# Patient Record
Sex: MALE | Race: WHITE | NOT HISPANIC OR LATINO | Employment: OTHER | URBAN - METROPOLITAN AREA
[De-identification: names, ages, dates, MRNs, and addresses within clinical notes are randomized per-mention and may not be internally consistent; named-entity substitution may affect disease eponyms.]

---

## 2017-01-16 ENCOUNTER — OFFICE VISIT (OUTPATIENT)
Dept: NEUROSURGERY | Facility: CLINIC | Age: 52
End: 2017-01-16

## 2017-01-16 VITALS
HEART RATE: 64 BPM | DIASTOLIC BLOOD PRESSURE: 80 MMHG | WEIGHT: 280 LBS | BODY MASS INDEX: 37.93 KG/M2 | HEIGHT: 72 IN | SYSTOLIC BLOOD PRESSURE: 122 MMHG

## 2017-01-16 DIAGNOSIS — G60.9 PERIPHERAL NEUROPATHY, IDIOPATHIC: Primary | ICD-10-CM

## 2017-01-16 DIAGNOSIS — M54.10 RADICULOPATHY OF ARM: ICD-10-CM

## 2017-01-16 PROCEDURE — 99213 OFFICE O/P EST LOW 20 MIN: CPT | Performed by: NEUROLOGICAL SURGERY

## 2017-01-16 NOTE — PROGRESS NOTES
Subjective   Patient ID: Malik Abbasi is a 51 y.o. male who is here today for follow-up numbness of the hands. He had an EMG/NCV on 12/30/16. He is unaccompanied for this visit today.     History of Present Illness Patient is chronically AC for cardiac issues (Atrial fibrillation/CHF?).  He reports he has had hand symtpoms for  some time.  Arm pain is positional.  Numbness is problematic as well.  He is worried about his wife and she will soon be my patient.      The following portions of the patient's history were reviewed and updated as appropriate: allergies, current medications, past family history, past medical history, past social history, past surgical history and problem list.    Review of Systems   Musculoskeletal: Negative for neck pain.   Neurological: Positive for numbness (B) hands & feet.   Psychiatric/Behavioral: Negative for sleep disturbance.       Objective   Physical Exam  Neurologic Exam     Physical Exam  Neurologic Exam   Right deltoid: 5/5  Left deltoid: 5/5  Right biceps: 5/5  Left biceps: 5/5  Right triceps: 5/5  Left triceps: 5/5  Right wrist flexion: 5/5  Left wrist flexion: 5/5  Right wrist extension: 5/5  Left wrist extension: 5/5  Right interossei: 5/5  Left interossei: 5/5      Sensory Exam   Right arm light touch: normal  Left arm light touch: sensitive in left deltoid.  Right leg light touch: normal  Left leg light touch: normal  Right arm pinprick: normal  Left arm pinprick: sensitive around left deltoid.      Gait, Coordination, and Reflexes       Gait  Gait: normal      Coordination   Romberg: negative      Reflexes   Right brachioradialis: 1+  Left brachioradialis: 1+  Right biceps: 1+  Left biceps: 1+  Right triceps: 1+  Left triceps: 1+  Right patellar: 1+  Left patellar: 1+  Right achilles: 1+  Left achilles: 1+  Right Corea: absent  Left Corea: absent  Right ankle clonus: absent  Left ankle clonus: absent      No phalen's  Minimal tinels at right wrist and left elbow  bilaterally    Assessment/Plan   Independent Review of Radiographic Studies:    I reviewed his MRI and emg/ncv.  He has moderate to severe ddd c56/c67.  Medical Decision Making:    He has nominal pain which is positional.  He is fully AC which complicates his situation.  He does have emg/ncv proven bilateral Carpal tunnel syndrome.  He is not ready to consider surgery currently.  I will rx. Cock up splints.   Malik was seen today for numbness.    Diagnoses and all orders for this visit:    Peripheral neuropathy, idiopathic    Radiculopathy of arm    No Follow-up on file.

## 2017-01-16 NOTE — MR AVS SNAPSHOT
Malik Elroy   1/16/2017 3:45 PM   Office Visit    Dept Phone:  [phone]   Encounter #:  [id number]    Provider:  Cornelio Russo IV, MD   Department:  On license of [facility] ADV NEUROSURGERY                Your Full Care Plan              Today's Medication Changes          These changes are accurate as of: 1/16/17  4:14 PM.  If you have any questions, ask your nurse or doctor.               Medication(s)that have changed:     aspirin 81 MG EC tablet   Take 81 mg by mouth Daily.   What changed:  Another medication with the same name was removed. Continue taking this medication, and follow the directions you see here.   Changed by:  Cornelio Russo IV, MD                  Your Updated Medication List          This list is accurate as of: 1/16/17  4:14 PM.  Always use your most recent med list.                aspirin 81 MG EC tablet       BENICAR HCT 40-25 MG per tablet   Generic drug:  olmesartan-hydrochlorothiazide       digoxin 125 MCG tablet   Commonly known as:  LANOXIN       fenofibric acid 135 MG capsule delayed-release delayed release capsule   Commonly known as:  TRILIPIX       furosemide 20 MG tablet   Commonly known as:  LASIX       gabapentin 300 MG capsule   Commonly known as:  NEURONTIN   Take 1 qhs for 3-5 days, then bid for 3-5 days, then tid and stay on this dose       metoprolol succinate XL 50 MG 24 hr tablet   Commonly known as:  TOPROL-XL       MULTAQ 400 MG tablet   Generic drug:  dronedarone       NEXIUM 40 MG capsule   Generic drug:  esomeprazole       XARELTO 20 MG tablet   Generic drug:  rivaroxaban               You Were Diagnosed With        Codes Comments    Peripheral neuropathy, idiopathic    -  Primary ICD-10-CM: G60.9  ICD-9-CM: 356.9     Radiculopathy of arm     ICD-10-CM: M54.10  ICD-9-CM: 723.4       Instructions     None    Patient Instructions History      Upcoming Appointments     Visit Type Date Time Department    OFFICE VISIT 1/16/2017  3:45 PM [facility]  "ADV NEURO [facility]      Screach[employer] Signup     [facility] [employer] allows you to send messages to your doctor, view your test results, renew your prescriptions, schedule appointments, and more. To sign up, go to [email or web address] and click on the Sign Up Now link in the New User? box. Enter your [employer] Activation Code exactly as it appears below along with the last four digits of your Social Security Number and your Date of Birth () to complete the sign-up process. If you do not sign up before the expiration date, you must request a new code.    2NDNATURE Activation Code: [email or web address]  Expires: 2017  5:35 AM    If you have questions, you can email [email or web address] or call [phone] to talk to our 2ND[employer] staff. Remember, [employer]URE is NOT to be used for urgent needs. For medical emergencies, dial 911.               Other Info from Your Visit           Other Notes About Your Plan     FT Aneudy PCP [facility]        Allergies     Codeine Intolerance Other (See Comments)    Syncope      Reason for Visit     Numbness Hands       Vital Signs     Blood Pressure Pulse Height Weight Body Mass Index Smoking Status    122/80 (BP Location: Left arm, Patient Position: Sitting, Cuff Size: Adult) 64 72\" (182.9 cm) 280 lb (127 kg) 37.97 kg/m2 Former Smoker      Problems and Diagnoses Noted     Peripheral neuropathy, idiopathic    Radiculopathy of arm        "

## 2019-05-24 ENCOUNTER — HOSPITAL ENCOUNTER (OUTPATIENT)
Dept: MRI IMAGING | Facility: HOSPITAL | Age: 54
Discharge: HOME | End: 2019-05-24
Attending: PHYSICIAN ASSISTANT

## 2019-06-19 ENCOUNTER — CONVERSION ENCOUNTER (OUTPATIENT)
Dept: ORTHOPEDIC SURGERY | Facility: CLINIC | Age: 54
End: 2019-06-19

## 2019-06-19 ENCOUNTER — OFFICE VISIT CONVERTED (OUTPATIENT)
Dept: ORTHOPEDIC SURGERY | Facility: CLINIC | Age: 54
End: 2019-06-19
Attending: ORTHOPAEDIC SURGERY

## 2019-09-04 ENCOUNTER — OFFICE VISIT CONVERTED (OUTPATIENT)
Dept: ORTHOPEDIC SURGERY | Facility: CLINIC | Age: 54
End: 2019-09-04
Attending: ORTHOPAEDIC SURGERY

## 2019-11-15 LAB
ANION GAP SERPL CALC-SCNC: 17 MMOL/L (ref 8–19)
BUN SERPL-MCNC: 18 MG/DL (ref 5–25)
BUN/CREAT SERPL: 17 {RATIO} (ref 6–20)
CALCIUM SERPL-MCNC: 9 MG/DL (ref 8.7–10.4)
CHLORIDE SERPL-SCNC: 105 MMOL/L (ref 99–111)
CONV CO2: 24 MMOL/L (ref 22–32)
CREAT UR-MCNC: 1.03 MG/DL (ref 0.7–1.2)
GFR SERPLBLD BASED ON 1.73 SQ M-ARVRAT: >60 ML/MIN/{1.73_M2}
GLUCOSE SERPL-MCNC: 116 MG/DL (ref 70–99)
OSMOLALITY SERPL CALC.SUM OF ELEC: 297 MOSM/KG (ref 273–304)
POTASSIUM SERPL-SCNC: 3.5 MMOL/L (ref 3.5–5.3)
SODIUM SERPL-SCNC: 142 MMOL/L (ref 135–147)

## 2019-11-21 ENCOUNTER — HOSPITAL ENCOUNTER (OUTPATIENT)
Dept: PERIOP | Facility: HOSPITAL | Age: 54
Setting detail: HOSPITAL OUTPATIENT SURGERY
Discharge: HOME | End: 2019-11-21
Attending: ORTHOPAEDIC SURGERY

## 2019-12-04 ENCOUNTER — OFFICE VISIT CONVERTED (OUTPATIENT)
Dept: ORTHOPEDIC SURGERY | Facility: CLINIC | Age: 54
End: 2019-12-04
Attending: PHYSICIAN ASSISTANT

## 2019-12-18 ENCOUNTER — HOSPITAL ENCOUNTER (OUTPATIENT)
Dept: OTHER | Facility: HOSPITAL | Age: 54
Setting detail: RECURRING SERIES
Discharge: HOME | End: 2020-03-12
Attending: ORTHOPAEDIC SURGERY

## 2019-12-30 ENCOUNTER — OFFICE VISIT CONVERTED (OUTPATIENT)
Dept: ORTHOPEDIC SURGERY | Facility: CLINIC | Age: 54
End: 2019-12-30
Attending: PHYSICIAN ASSISTANT

## 2020-01-27 ENCOUNTER — CONVERSION ENCOUNTER (OUTPATIENT)
Dept: ORTHOPEDIC SURGERY | Facility: CLINIC | Age: 55
End: 2020-01-27

## 2020-01-27 ENCOUNTER — OFFICE VISIT CONVERTED (OUTPATIENT)
Dept: ORTHOPEDIC SURGERY | Facility: CLINIC | Age: 55
End: 2020-01-27
Attending: PHYSICIAN ASSISTANT

## 2020-04-29 ENCOUNTER — TELEMEDICINE CONVERTED (OUTPATIENT)
Dept: ORTHOPEDIC SURGERY | Facility: CLINIC | Age: 55
End: 2020-04-29
Attending: PHYSICIAN ASSISTANT

## 2020-09-28 ENCOUNTER — OFFICE VISIT CONVERTED (OUTPATIENT)
Dept: SURGERY | Facility: CLINIC | Age: 55
End: 2020-09-28
Attending: NURSE PRACTITIONER

## 2021-01-11 ENCOUNTER — HOSPITAL ENCOUNTER (OUTPATIENT)
Dept: GASTROENTEROLOGY | Facility: HOSPITAL | Age: 56
Setting detail: HOSPITAL OUTPATIENT SURGERY
Discharge: HOME | End: 2021-01-11
Attending: SURGERY

## 2021-03-22 ENCOUNTER — HOSPITAL ENCOUNTER (OUTPATIENT)
Dept: MRI IMAGING | Facility: HOSPITAL | Age: 56
Discharge: HOME | End: 2021-03-22
Attending: PHYSICIAN ASSISTANT

## 2021-03-24 ENCOUNTER — HOSPITAL ENCOUNTER (OUTPATIENT)
Dept: VACCINE CLINIC | Facility: HOSPITAL | Age: 56
Discharge: HOME | End: 2021-03-24
Attending: INTERNAL MEDICINE

## 2021-04-14 ENCOUNTER — HOSPITAL ENCOUNTER (OUTPATIENT)
Dept: VACCINE CLINIC | Facility: HOSPITAL | Age: 56
Discharge: HOME | End: 2021-04-14
Attending: INTERNAL MEDICINE

## 2021-05-10 NOTE — H&P
History and Physical      Patient Name: Malik Abbasi   Patient ID: [id number]   Sex: Male   YOB: 1965    Primary Care Provider: Valeriano JACQUES   Referring Provider: Valeriano JACQUES    Visit Date: September 28, 2020    Provider: RAFIQ Stanford   Location: [facility]   Location Address: [address] [place] [address]  [id number]   Location Phone: [phone]          Chief Complaint  · Requesting colonoscopy  · Age 50 or over  · Diarrhea      History Of Present Illness  The patient is a 55 year old /White male presenting to the Surgical Specialist office on a referral from Valeriano JACQUES.   Malik Abbasi needs to have a diagnostic colonoscopy.   Patient states that they have had a colonoscopy. 10 years ago   Patient currently complains of: diarrhea   Patient Does not have family history of colon cancer.      Patient presents today on referral from Valeriano Rivero for diarrhea.  Patient's had a cholecystectomy several years ago.  But reports since having his gallbladder out he has to instantly have a bowel movement.  Patient denies any abdominal pain, or rectal bleeding.  Denies any family history of colorectal cancer.    I have discussed with the patient that sometimes after having a cholecystectomy diarrhea can be a side effect to having the gallbladder removed patient reports that this is a new symptom for him.    Reports that he takes Xarelto daily for A. fib and is under the care of Dr. Perez Garces.  We will get cardiac clearance prior to procedure.       Past Medical History  Disease Name Date Onset Notes   Arthritis --  --    Congestive heart failure --  --    GERD --  --    High blood pressure --  --    High cholesterol --  --    Hyperlipemia --  --    Hypertension --  --    Primary osteoarthritis of left knee 08/31/2017 --    Reflux --  --          Past Surgical History  Procedure Name Date Notes   Cardiac --  --    Colonoscopy --  --     Gallbladder --  --    Left knee arthroscopy with debridement --  --    Vasectomy --  --          Medication List  Name Date Started Instructions   Aspir-81 81 mg oral tablet,delayed release (DR/EC)  take 1 tablet (81 mg) by oral route once daily   Benicar HCT 40-25 mg oral tablet  take 1 tablet by oral route once daily   Dulcolax (bisacodyl) 5 mg oral tablet,delayed release (DR/EC) 09/28/2020 take as directed. Directions given in office   fenofibric acid 35 mg oral tablet  take 1 tablet (35 mg) by oral route once daily   furosemide 20 mg oral tablet  --    Lanoxin 125 mcg oral tablet  take 1 tablet (125 mcg) by oral route once daily   Miralax 17 gram/dose oral powder 09/28/2020 take as directed. Instructions given in office   Multaq 400 mg oral tablet  take 1 tablet (400 mg) by oral route 2 times per day with morning and evening meals   pantoprazole 40 mg oral tablet,delayed release (DR/EC)  --    Toprol XL 50 mg oral tablet extended release 24 hr  take 1 tablet (50 mg) by oral route once daily   Trilipix 135 mg oral capsule,delayed release(DR/EC)  take 1 capsule (135 mg) by oral route once daily   Xarelto 20 mg oral tablet  take 1 tablet (20 mg) by oral route once daily with the evening meal         Allergy List  Allergen Name Date Reaction Notes   Codeine Phosphate --  --  --    Codeine Sulfate --  --  --          Family Medical History  Disease Name Relative/Age Notes   Heart Disease Brother/  Father/   Father; Brother   Family history of certain chronic disabling diseases; arthritis Father/  Mother/   Mother; Father   Family history of Arthritis Mother/   Mother         Social History  Finding Status Start/Stop Quantity Notes   Alcohol Use Current some day --/-- --  rarely drinks, less than 1 drink per day, has been drinking for 31 or more years   lives with spouse --  --/-- --  --    . --  --/-- --  --    Recreational Drug Use Never --/-- --  no   Retired. --  --/-- --  --    Tobacco Former --/-- --   former smoker  former smoker quit 2005   Working --  --/-- --  --          Review of Systems  · Constitutional  o Denies  o : fever, chills  · Eyes  o Denies  o : yellowish discoloration of eyes  · HENT  o Denies  o : difficulty swallowing  · Cardiovascular  o Denies  o : chest pain, chest pain on exertion  · Respiratory  o Denies  o : shortness of breath  · Gastrointestinal  o Admits  o : diarrhea  o Denies  o : nausea, vomiting, constipation  · Genitourinary  o Denies  o : abnormal color of urine  · Integument  o Denies  o : rash  · Neurologic  o Denies  o : tingling or numbness  · Musculoskeletal  o Denies  o : joint pain  · Endocrine  o Denies  o : weight gain, weight loss      Vitals  Date Time BP Position Site L\R Cuff Size HR RR TEMP (F) WT  HT  BMI kg/m2 BSA m2 O2 Sat HC       09/28/2020 01:40 PM       18  324lbs 8oz 6'   44.01 2.73           Physical Examination  · Constitutional  o Appearance  o : well developed, well-nourished, patient in no apparent distress  · Head and Face  o Head  o :   § Inspection  § : atraumatic, normocephalic  o Face  o :   § Inspection  § : no facial lesions  · Eyes  o Conjunctivae  o : conjunctivae normal  o Sclerae  o : sclerae white  · Neck  o Inspection/Palpation  o : normal appearance, no masses or tenderness, trachea midline  · Respiratory  o Respiratory Effort  o : breathing unlabored  · Skin and Subcutaneous Tissue  o General Inspection  o : no lesions present, no areas of discoloration, skin turgor normal, texture normal  · Neurologic  o Mental Status Examination  o :   § Orientation  § : grossly oriented to person, place and time  § Attention  § : attention normal, concentration abilities normal  § Fund of Knowledge  § : fund of knowledge within normal limits, patient aware of current events  o Gait and Station  o : normal gait, able to stand without difficulty  · Psychiatric  o Judgement and Insight  o : judgment and insight intact  o Mood and Affect  o : mood normal,  affect appropriate              Assessment  · Diarrhea     787.91/R19.7  · Pre-op testing     V72.84/Z01.818    Problems Reconciled  Plan  · Orders  o Consent for Colonoscopy with Possible Biopsy - Possible risks/complications, benefits, and alternatives to surgical or invasive procedure have been explained to patient and/or legal guardian. -Patient has been evaluated and can tolerate anesthesia and/or sedation. Risks, benefits, and alternatives to anesthesia and sedation have been explained to patient and/or legal guardian. (73846) - 787.91/R19.7, V72.84/Z01.818 - 01/11/2021  o [facility] Pre-Op Covid-19 Screening (72075) - 787.91/R19.7, V72.84/Z01.818 - 01/06/2021   [address] @ 8:00am  · Medications  o Medications have been Reconciled  o Transition of Care or Provider Policy  · Instructions  o Surgical Facility: [facility]  o Handouts Provided Pre-Procedure Instructions including date, time, and location of procedure.   o PLAN: Proceeed with colonoscopy. Patient understands risks/benefits and is willing to proceed.   o ***Surgical Orders***  o RISK AND BENEFITS:  o Given these options, the patient has verbally expressed an understanding of the risks of the surgery and finds these risks acceptable. Will proceed with surgery as soon as possible.  o O.R. PREP: Per protocol   o IV: Per Anesthesia  o Please sign permit for: Colonoscopy with possible biopsies by Dr. Mcintyre.  o The above History and Physical Examination has been completed within 30 days of admission.  o ***Patient Status***  o Outpatient  o Follow up in the in the office post procedure.  o Advised patient he would need COVID-19 testing prior to procedure. Encourage patient to self isolate in between testing and procedure. Patient verbalizes understanding is willing to proceed  o instructed patient to hold his Xarelto for 2 day prior to procedure or until clearance from Dr. Perez Garcse.  o Electronically Identified Patient Education  Materials Provided Electronically  · Disposition  o Call or Return if symptoms worsen or persist.            Electronically Signed by: RAFIQ Stanford -Author on September 28, 2020 02:22:58 PM

## 2021-05-12 NOTE — PROGRESS NOTES
Progress Note      Patient Name: Malik Abbasi   Patient ID: [id number]   Sex: Male   YOB: 1965    Primary Care Provider: Valeriano JACQUES   Referring Provider: Valeriano JACQUES    Visit Date: April 29, 2020    Provider: Liana Pappas PA-C   Location: [facility]   Location Address: [address] [place] [address]  [id number]   Location Phone: [phone]          History Of Present Illness  Video Conferencing Visit  Malik Abbasi is a 55 year old /White male who is presenting for evaluation via video conferencing. Verbal consent obtained before beginning visit.   The following staff were present during this visit: INPUT BOX   Malik Abbasi is a 55 year old /White male who presents today to [facility].      Patient is status post right arthroscopic SAD/DC, mini open RCR, biceps tenodesis 11/21/19 by Dr. Chang. Patient states stiffness in right shoulder. Patient completed physical therapy at [facility].               Past Medical History  Arthritis; Congestive heart failure; Hyperlipemia; Hypertension; Primary osteoarthritis of left knee; Reflux         Past Surgical History  Colonoscopy; Gallbladder; Left knee arthroscopy with debridement; Vasectomy         Medication List  Aspir-81 81 mg oral tablet,delayed release (DR/EC); Benicar HCT 40-25 mg oral tablet; furosemide 20 mg oral tablet; Lanoxin 125 mcg oral tablet; Multaq 400 mg oral tablet; Toprol XL 50 mg oral tablet extended release 24 hr; Trilipix 135 mg oral capsule,delayed release(DR/EC); Xarelto 20 mg oral tablet         Allergy List  Codeine Phosphate; Codeine Sulfate         Family Medical History  Heart Disease; Family history of certain chronic disabling diseases; arthritis; Family history of Arthritis         Social History  Alcohol Use (Current some day); lives with spouse; .; Recreational Drug Use (Never); Retired.; Tobacco (Former); Working         Review of  Systems  · Constitutional  o Denies  o : fever, chills, weight loss  · Cardiovascular  o Denies  o : chest pain, shortness of breath  · Gastrointestinal  o Denies  o : liver disease, heartburn, nausea, blood in stools  · Genitourinary  o Denies  o : painful urination, blood in urine  · Integument  o Denies  o : rash, itching  · Neurologic  o Denies  o : headache, weakness, loss of consciousness  · Musculoskeletal  o Denies  o : painful, swollen joints  · Psychiatric  o Denies  o : drug/alcohol addiction, anxiety, depression      Vitals  Date Time BP Position Site L\R Cuff Size HR RR TEMP (F) WT  HT  BMI kg/m2 BSA m2 O2 Sat HC       04/29/2020 07:35 AM         290lbs 0oz 6'   39.33 2.59           Physical Examination  · Constitutional  o Appearance  o : well developed, well-nourished, no obvious deformities present  · Head and Face  o Head  o :   § Inspection  § : normocephalic  o Face  o :   § Inspection  § : no facial lesions  · Eyes  o Conjunctivae  o : conjunctivae normal  o Sclerae  o : sclerae white  · Ears, Nose, Mouth and Throat  o Ears  o :   § External Ears  § : appearance within normal limits  § Hearing  § : intact  o Nose  o :   § External Nose  § : appearance normal  · Neck  o Inspection/Palpation  o : normal appearance  o Range of Motion  o : full range of motion  · Skin and Subcutaneous Tissue  o General Inspection  o : intact, no rashes  · Psychiatric  o General  o : Alert and oriented x3  o Judgement and Insight  o : judgment and insight intact  o Mood and Affect  o : mood normal, affect appropriate  · Right Shoulder-Street  o Inspection  o : scars well healed  o ROM  o : Full extension, full flexion, full abduction, full internal rotation, full external rotation, no winging of scapula, no scapular dyskinesis, full cervical ROM , full cross body adduction          Assessment  · Aftercare following surgery of the muskuloskeletal system     V54.81  · Right shoulder pain, unspecified  chronicity     719.41/M25.511      Plan  · Medications  o Medications have been Reconciled  o Transition of Care or Provider Policy  · Instructions  o Reviewed the patient's Past Medical, Social, and Family history as well as the ROS at today's visit, no changes.  o Call or return if worsening symptoms.  o Follow Up PRN.  o Electronically Identified Patient Education Materials Provided Electronically  · Referrals  o ID: [id number] Date: 06/19/2019 Type: Inbound  Specialty: Orthopedic Surgery            Electronically Signed by: Liana Pappas PA-C -Author on April 29, 2020 07:57:14 AM  Electronically Co-signed by: Fred Chang MD -Reviewer on April 29, 2020 09:07:04 AM

## 2021-05-14 VITALS — HEIGHT: 72 IN | RESPIRATION RATE: 18 BRPM | BODY MASS INDEX: 42.66 KG/M2 | WEIGHT: 315 LBS

## 2021-05-15 VITALS — HEIGHT: 72 IN | HEART RATE: 84 BPM | OXYGEN SATURATION: 96 % | BODY MASS INDEX: 42.16 KG/M2 | WEIGHT: 311.25 LBS

## 2021-05-15 VITALS — WEIGHT: 293 LBS | OXYGEN SATURATION: 96 % | HEART RATE: 63 BPM | HEIGHT: 72 IN | BODY MASS INDEX: 39.68 KG/M2

## 2021-05-15 VITALS — WEIGHT: 292 LBS | OXYGEN SATURATION: 97 % | HEART RATE: 67 BPM | HEIGHT: 72 IN | BODY MASS INDEX: 39.55 KG/M2

## 2021-05-15 VITALS — HEIGHT: 72 IN | OXYGEN SATURATION: 97 % | HEART RATE: 88 BPM | WEIGHT: 297 LBS | BODY MASS INDEX: 40.23 KG/M2

## 2021-05-15 VITALS — HEIGHT: 72 IN | BODY MASS INDEX: 40.23 KG/M2 | OXYGEN SATURATION: 98 % | HEART RATE: 78 BPM | WEIGHT: 297 LBS

## 2021-05-15 VITALS — HEIGHT: 72 IN | BODY MASS INDEX: 39.28 KG/M2 | WEIGHT: 290 LBS

## 2021-12-03 ENCOUNTER — TRANSCRIBE ORDERS (OUTPATIENT)
Dept: ADMINISTRATIVE | Facility: HOSPITAL | Age: 56
End: 2021-12-03

## 2021-12-03 DIAGNOSIS — I10 ESSENTIAL HYPERTENSION: ICD-10-CM

## 2021-12-03 DIAGNOSIS — R07.9 CHEST PAIN, UNSPECIFIED TYPE: Primary | ICD-10-CM

## 2021-12-20 ENCOUNTER — HOSPITAL ENCOUNTER (OUTPATIENT)
Dept: NUCLEAR MEDICINE | Facility: HOSPITAL | Age: 56
Discharge: HOME | End: 2021-12-20

## 2021-12-20 DIAGNOSIS — I10 ESSENTIAL HYPERTENSION: ICD-10-CM

## 2021-12-20 DIAGNOSIS — R07.9 CHEST PAIN, UNSPECIFIED TYPE: ICD-10-CM

## 2021-12-20 PROCEDURE — A9502 TC99M TETROFOSMIN: HCPCS | Performed by: SPECIALIST

## 2021-12-20 PROCEDURE — 78452 HT MUSCLE IMAGE SPECT MULT: CPT

## 2021-12-20 PROCEDURE — 0 TECHNETIUM TETROFOSMIN KIT: Performed by: SPECIALIST

## 2021-12-20 PROCEDURE — 25010000002 REGADENOSON 0.4 MG/5ML SOLUTION

## 2021-12-20 PROCEDURE — 93017 CV STRESS TEST TRACING ONLY: CPT

## 2021-12-20 RX ADMIN — TETROFOSMIN 1 DOSE: 1.38 INJECTION, POWDER, LYOPHILIZED, FOR SOLUTION INTRAVENOUS at 10:07

## 2021-12-20 RX ADMIN — REGADENOSON 0.4 MG: 0.08 INJECTION, SOLUTION INTRAVENOUS at 10:07

## 2021-12-20 RX ADMIN — TETROFOSMIN 1 DOSE: 1.38 INJECTION, POWDER, LYOPHILIZED, FOR SOLUTION INTRAVENOUS at 07:38

## 2021-12-21 LAB
BH CV IMMEDIATE POST TECH DATA BLOOD PRESSURE: NORMAL MMHG
BH CV IMMEDIATE POST TECH DATA HEART RATE: 92 BPM
BH CV IMMEDIATE POST TECH DATA OXYGEN SATS: 95 %
BH CV REST NUCLEAR ISOTOPE DOSE: 9 MCI
BH CV SIX MINUTE RECOVERY TECH DATA BLOOD PRESSURE: NORMAL
BH CV SIX MINUTE RECOVERY TECH DATA HEART RATE: 73 BPM
BH CV SIX MINUTE RECOVERY TECH DATA OXYGEN SATURATION: 95 %
BH CV SIX MINUTE RECOVERY TECH DATA SYMPTOMS: NORMAL
BH CV STRESS BP STAGE 1: NORMAL
BH CV STRESS COMMENTS STAGE 1: NORMAL
BH CV STRESS DOSE REGADENOSON STAGE 1: 0.4
BH CV STRESS DURATION MIN STAGE 1: 0
BH CV STRESS DURATION SEC STAGE 1: 10
BH CV STRESS HR STAGE 1: 61
BH CV STRESS NUCLEAR ISOTOPE DOSE: 35.7 MCI
BH CV STRESS O2 STAGE 1: 97
BH CV STRESS PROTOCOL 1: NORMAL
BH CV STRESS RECOVERY BP: NORMAL MMHG
BH CV STRESS RECOVERY HR: 83 BPM
BH CV STRESS RECOVERY O2: 95 %
BH CV STRESS STAGE 1: 1
BH CV THREE MINUTE POST TECH DATA BLOOD PRESSURE: NORMAL MMHG
BH CV THREE MINUTE POST TECH DATA HEART RATE: 70 BPM
BH CV THREE MINUTE POST TECH DATA OXYGEN SATURATION: 97 %
MAXIMAL PREDICTED HEART RATE: 162 BPM
PERCENT MAX PREDICTED HR: 60.49 %
STRESS BASELINE BP: NORMAL MMHG
STRESS BASELINE HR: 56 BPM
STRESS O2 SAT REST: 96 %
STRESS PERCENT HR: 71 %
STRESS POST O2 SAT PEAK: 98 %
STRESS POST PEAK BP: NORMAL MMHG
STRESS POST PEAK HR: 98 BPM
STRESS TARGET HR: 138 BPM

## 2021-12-30 ENCOUNTER — APPOINTMENT (OUTPATIENT)
Dept: NUCLEAR MEDICINE | Facility: HOSPITAL | Age: 56
End: 2021-12-30

## 2022-04-15 ENCOUNTER — TELEPHONE (OUTPATIENT)
Dept: ORTHOPEDIC SURGERY | Facility: CLINIC | Age: 57
End: 2022-04-15

## 2022-04-15 NOTE — TELEPHONE ENCOUNTER
Provider: DR NEWSOME  Caller: SOPHIA MENDENHALL  Relationship to Patient: PATIENT     Phone Number: [phone]  Reason for Call: PATIENT SAYS HE IS ESTABLISHED WITH DR NEWSOME OVER 5 YEARS AGO WITH JINNY KNEE INJECTIONS.  PATIENT WAS LAST SEEN BY DR NEWSOME IN 2019 FOR R/SHOULDER    PATIENT IS GOING ON A TRIP AND WOULD LIKE TO COME IN FOR JINNY KNEE INJECTIONS, LEAVING MAY 7 2022

## 2022-04-25 ENCOUNTER — OFFICE VISIT (OUTPATIENT)
Dept: ORTHOPEDIC SURGERY | Facility: CLINIC | Age: 57
End: 2022-04-25

## 2022-04-25 VITALS — BODY MASS INDEX: 42.66 KG/M2 | WEIGHT: 315 LBS | HEIGHT: 72 IN | OXYGEN SATURATION: 98 % | HEART RATE: 88 BPM

## 2022-04-25 DIAGNOSIS — G89.29 CHRONIC PAIN OF BOTH KNEES: Primary | ICD-10-CM

## 2022-04-25 DIAGNOSIS — M25.561 CHRONIC PAIN OF BOTH KNEES: Primary | ICD-10-CM

## 2022-04-25 DIAGNOSIS — M25.562 CHRONIC PAIN OF BOTH KNEES: Primary | ICD-10-CM

## 2022-04-25 PROCEDURE — 20610 DRAIN/INJ JOINT/BURSA W/O US: CPT | Performed by: PHYSICIAN ASSISTANT

## 2022-04-25 RX ORDER — PANTOPRAZOLE SODIUM 40 MG/1
TABLET, DELAYED RELEASE ORAL
COMMUNITY
Start: 2022-03-10 | End: 2022-11-18

## 2022-04-25 RX ORDER — TRIAMCINOLONE ACETONIDE 40 MG/ML
40 INJECTION, SUSPENSION INTRA-ARTICULAR; INTRAMUSCULAR
Status: COMPLETED | OUTPATIENT
Start: 2022-04-25 | End: 2022-04-25

## 2022-04-25 RX ORDER — ASPIRIN 81 MG/1
TABLET ORAL
COMMUNITY

## 2022-04-25 RX ORDER — FENOFIBRIC ACID 35 MG/1
TABLET ORAL
COMMUNITY
End: 2022-11-18

## 2022-04-25 RX ORDER — LIDOCAINE HYDROCHLORIDE 10 MG/ML
5 INJECTION, SOLUTION INFILTRATION; PERINEURAL
Status: COMPLETED | OUTPATIENT
Start: 2022-04-25 | End: 2022-04-25

## 2022-04-25 RX ORDER — ERGOCALCIFEROL 1.25 MG/1
50000 CAPSULE ORAL WEEKLY
COMMUNITY
Start: 2022-03-10

## 2022-04-25 RX ORDER — DIGOXIN 125 MCG
TABLET ORAL
COMMUNITY
End: 2022-11-18

## 2022-04-25 RX ORDER — ALLOPURINOL 300 MG/1
300 TABLET ORAL DAILY
COMMUNITY
Start: 2022-04-01 | End: 2023-04-01

## 2022-04-25 RX ORDER — INDOMETHACIN 50 MG/1
50 CAPSULE ORAL AS NEEDED
COMMUNITY
Start: 2022-03-08

## 2022-04-25 RX ADMIN — LIDOCAINE HYDROCHLORIDE 5 ML: 10 INJECTION, SOLUTION INFILTRATION; PERINEURAL at 08:18

## 2022-04-25 RX ADMIN — TRIAMCINOLONE ACETONIDE 40 MG: 40 INJECTION, SUSPENSION INTRA-ARTICULAR; INTRAMUSCULAR at 08:18

## 2022-04-25 NOTE — PROGRESS NOTES
"Chief Complaint  Bilateral knee pain     Subjective          Malik Abbasi presents to [facility] for follow-up on bilateral knee pain.  Patient last seen in 2017.  He has a history of left knee scope.  States both knees have been hurting worse recently and is [place] and wants to be able to walk around without as much pain.  Left knee feels stiff, right knee is more painful.  He notices decreased flexion on the left knee.  Not currently taking anything for pain relief.  He is requesting bilateral knee steroid injections today.  Patient is not diabetic.    Objective   Allergies   Allergen Reactions   • Codeine Other (See Comments)     Syncope       Vital Signs:   Pulse 88   Ht 182.9 cm (72\")   Wt (!) 148 kg (325 lb 9.6 oz)   SpO2 98%   BMI 44.16 kg/m²       Physical Exam  Constitutional:       Appearance: Normal appearance. Patient is well-developed and normal weight.   HENT:      Head: Normocephalic.      Right Ear: Hearing and external ear normal.      Left Ear: Hearing and external ear normal.      Nose: Nose normal.   Eyes:      Conjunctiva/sclera: Conjunctivae normal.   Cardiovascular:      Rate and Rhythm: Normal rate.   Pulmonary:      Effort: Pulmonary effort is normal.      Breath sounds: No wheezing or rales.   Abdominal:      Palpations: Abdomen is soft.      Tenderness: There is no abdominal tenderness.   Musculoskeletal:      Cervical back: Normal range of motion.   Skin:     Findings: No rash.   Neurological:      Mental Status: Patient is alert and oriented to person, place, and time.   Psychiatric:         Mood and Affect: Mood and affect normal.         Judgment: Judgment normal.     Ortho Exam  Bilateral knees: Skin intact, moderate swelling, tenderness on the joint line, crepitus with range of motion, left knee range of motion 0-115 right knee 0-120.  Gait nonantalgic.  Knees are stable on exam.  Sensation and pulses " intact distally, good range of motion ankle and digits.  Result Review :            Imaging Results (Most Recent)     Procedure Component Value Units Date/Time    XR Knee 3 View Bilateral [[id number]] Resulted: 04/25/22 1006     Updated: 04/25/22 1007    Narrative:      X-Ray Report:  Study: X-rays ordered, taken in the office, and reviewed today  Site: Bilateral knee xray  Indication: Pain  View: AP, Lateral and Sunrise view(s)  Findings: Evidence for moderate osteoarthritic changes most pronounced in   the medial joint space with narrowing, patellofemoral arthritis, no acute   osseous abnormalities or malalignment  Prior studies available for comparison: no            Left knee : L knee  Date/Time: 4/25/2022 8:18 AM  Consent given by: patient  Site marked: site marked  Timeout: Immediately prior to procedure a time out was called to verify the correct patient, procedure, equipment, support staff and site/side marked as required   Supporting Documentation  Indications: pain   Procedure Details  Location: knee - L knee  Needle gauge: 21G.  Medications administered: 5 mL lidocaine 1 %; 40 mg triamcinolone acetonide 40 MG/ML  Patient tolerance: patient tolerated the procedure well with no immediate complications    Right knee : R knee  Date/Time: 4/25/2022 8:18 AM  Consent given by: patient  Site marked: site marked  Timeout: Immediately prior to procedure a time out was called to verify the correct patient, procedure, equipment, support staff and site/side marked as required   Supporting Documentation  Indications: pain   Procedure Details  Location: knee - R knee  Needle gauge: 21G.  Medications administered: 5 mL lidocaine 1 %; 40 mg triamcinolone acetonide 40 MG/ML  Patient tolerance: patient tolerated the procedure well with no immediate complications            Assessment and Plan    Problem List Items Addressed This Visit        Musculoskeletal and Injuries    Chronic pain of both knees - Primary    Current  Assessment & Plan     X-rays taken and reviewed.  Patient requested bilateral knee steroid injections, risks and benefits discussed and he tolerated this well.  Plan to follow-up as needed moving forward for new or worsening symptoms.           Relevant Orders    XR Knee 3 View Bilateral (Completed)          Follow Up   Return if symptoms worsen or fail to improve.  Patient Instructions   X-rays taken and reviewed.  Patient requested bilateral knee steroid injections, risks and benefits discussed and he tolerated this well.  Plan to follow-up as needed moving forward for new or worsening symptoms.    Patient was given instructions and counseling regarding his condition or for health maintenance advice. Please see specific information pulled into the AVS if appropriate.

## 2022-04-25 NOTE — PATIENT INSTRUCTIONS
X-rays taken and reviewed.  Patient requested bilateral knee steroid injections, risks and benefits discussed and he tolerated this well.  Plan to follow-up as needed moving forward for new or worsening symptoms.

## 2022-09-16 ENCOUNTER — OFFICE VISIT (OUTPATIENT)
Dept: SURGERY | Facility: CLINIC | Age: 57
End: 2022-09-16

## 2022-09-16 ENCOUNTER — PREP FOR SURGERY (OUTPATIENT)
Dept: OTHER | Facility: HOSPITAL | Age: 57
End: 2022-09-16

## 2022-09-16 VITALS — RESPIRATION RATE: 20 BRPM | WEIGHT: 315 LBS | BODY MASS INDEX: 42.66 KG/M2 | HEIGHT: 72 IN

## 2022-09-16 DIAGNOSIS — K21.9 GASTROESOPHAGEAL REFLUX DISEASE WITHOUT ESOPHAGITIS: Primary | ICD-10-CM

## 2022-09-16 PROCEDURE — 99213 OFFICE O/P EST LOW 20 MIN: CPT | Performed by: SURGERY

## 2022-09-16 RX ORDER — PANTOPRAZOLE SODIUM 40 MG/1
40 TABLET, DELAYED RELEASE ORAL DAILY
COMMUNITY
Start: 2022-09-06

## 2022-09-16 NOTE — PROGRESS NOTES
Inpatient History and Physical Surgical Orders    Preadmission Location:   Preadmission Time:  Facility:  Surgery Date:  Surgery Time:  Preadmission Test date:     Chief Complaint  Outpatient History and Physical / Surgical Orders    Primary Care Provider: Valeriano Abdalla PA    Referring Provider: Valeriano Abdalla,*    Subjective      Patient Name: Malik Abbasi : 1965    HPI  The patient is a 57-year-old gentleman that we have taken care of in the past.  He is having more trouble with his reflux.  He takes daily Protonix 40 mg.  He does not feel like his symptom control is as good currently.    Past History:  Medical History: has a past medical history of Arthritis, GERD (gastroesophageal reflux disease), Hyperlipidemia, Hypertension, and Irregular heartbeat.   Surgical History: has a past surgical history that includes Cholecystectomy () and Cardioversion ().   Family History: family history includes Heart disease in his maternal grandfather.   Social History: reports that he quit smoking about 15 years ago. His smoking use included cigarettes. He smoked 1.50 packs per day. He has never used smokeless tobacco. He reports current alcohol use of about 6.0 standard drinks of alcohol per week. He reports that he does not use drugs.  Allergies: Codeine       Current Outpatient Medications:   •  allopurinol (ZYLOPRIM) 300 MG tablet, Take 300 mg by mouth Daily., Disp: , Rfl:   •  aspirin 81 MG EC tablet, Take 81 mg by mouth Daily., Disp: , Rfl:   •  aspirin 81 MG EC tablet, aspirin 81 mg tablet,delayed release  Take 1 tablet every day by oral route., Disp: , Rfl:   •  BENICAR HCT 40-25 MG per tablet, 1 tablet Daily., Disp: , Rfl:   •  digoxin (LANOXIN) 125 MCG tablet, Take 125 mcg by mouth every day., Disp: , Rfl:   •  digoxin (LANOXIN) 125 MCG tablet, Lanoxin 125 mcg oral tablet take 1 tablet (125 mcg) by oral route once daily   Active, Disp: , Rfl:   •  ergocalciferol  "(ERGOCALCIFEROL) 1.25 MG (22577 UT) capsule, ergocalciferol (vitamin D2) 1,250 mcg (50,000 unit) capsule, Disp: , Rfl:   •  fenofibric acid (TRILIPIX) 135 MG capsule delayed-release delayed release capsule, 135 mg Daily., Disp: , Rfl:   •  Fenofibric Acid 35 MG tablet, fenofibric acid 35 mg oral tablet take 1 tablet (35 mg) by oral route once daily   Active, Disp: , Rfl:   •  furosemide (LASIX) 20 MG tablet, 20 mg Daily., Disp: , Rfl:   •  gabapentin (NEURONTIN) 300 MG capsule, Take 1 qhs for 3-5 days, then bid for 3-5 days, then tid and stay on this dose (Patient taking differently: 2 (Two) Times a Day. Take 1 qhs for 3-5 days, then bid for 3-5 days, then tid and stay on this dose), Disp: 120 capsule, Rfl: 3  •  indomethacin (INDOCIN) 50 MG capsule, Take 50 mg by mouth 3 (Three) Times a Day., Disp: , Rfl:   •  metoprolol succinate XL (TOPROL-XL) 50 MG 24 hr tablet, 50 mg., Disp: , Rfl:   •  MULTAQ 400 MG tablet, 400 mg 2 (Two) Times a Day With Meals., Disp: , Rfl:   •  NEXIUM 40 MG capsule, Every Morning Before Breakfast., Disp: , Rfl:   •  pantoprazole (PROTONIX) 40 MG EC tablet, pantoprazole 40 mg tablet,delayed release, Disp: , Rfl:   •  pantoprazole (PROTONIX) 40 MG EC tablet, Take 40 mg by mouth Daily., Disp: , Rfl:   •  XARELTO 20 MG tablet, 20 mg Daily With Dinner., Disp: , Rfl:        Objective   Vital Signs:   Resp 20   Ht 182.9 cm (72\")   Wt (!) 144 kg (318 lb)   BMI 43.13 kg/m²       Physical Exam  Vitals and nursing note reviewed.   Constitutional:       Appearance: Normal appearance. The patient is well-developed.   Cardiovascular:      Rate and Rhythm: Normal rate and regular rhythm.   Pulmonary:      Effort: Pulmonary effort is normal.      Breath sounds: Normal air entry.   Abdominal:      General: Bowel sounds are normal.      Palpations: Abdomen is soft.      Skin:     General: Skin is warm and dry.   Neurological:      Mental Status: The patient is alert and oriented to person, place, and " time.      Motor: Motor function is intact.   Psychiatric:         Mood and Affect: Mood normal.       Result Review :               Assessment and Plan   Diagnoses and all orders for this visit:    1. Gastroesophageal reflux disease without esophagitis (Primary)    We will schedule him for an EGD.  I have described the procedure to him as well as the risk and benefits and he is agreeable to proceeding.    I  Reese Mcintyre MD  09/16/2022

## 2022-11-21 ENCOUNTER — HOSPITAL ENCOUNTER (OUTPATIENT)
Facility: HOSPITAL | Age: 57
Setting detail: HOSPITAL OUTPATIENT SURGERY
Discharge: HOME | End: 2022-11-21
Attending: SURGERY | Admitting: SURGERY

## 2022-11-21 ENCOUNTER — ANESTHESIA EVENT (OUTPATIENT)
Dept: GASTROENTEROLOGY | Facility: HOSPITAL | Age: 57
End: 2022-11-21

## 2022-11-21 ENCOUNTER — ANESTHESIA (OUTPATIENT)
Dept: GASTROENTEROLOGY | Facility: HOSPITAL | Age: 57
End: 2022-11-21

## 2022-11-21 VITALS
BODY MASS INDEX: 42.66 KG/M2 | OXYGEN SATURATION: 97 % | TEMPERATURE: 97.5 F | DIASTOLIC BLOOD PRESSURE: 91 MMHG | HEART RATE: 52 BPM | RESPIRATION RATE: 20 BRPM | HEIGHT: 72 IN | WEIGHT: 315 LBS | SYSTOLIC BLOOD PRESSURE: 128 MMHG

## 2022-11-21 DIAGNOSIS — K21.9 GASTROESOPHAGEAL REFLUX DISEASE WITHOUT ESOPHAGITIS: ICD-10-CM

## 2022-11-21 PROCEDURE — 25010000002 PROPOFOL 10 MG/ML EMULSION

## 2022-11-21 PROCEDURE — 88305 TISSUE EXAM BY PATHOLOGIST: CPT | Performed by: SURGERY

## 2022-11-21 PROCEDURE — 25010000002 ATROPINE PER 0.01 MG

## 2022-11-21 RX ORDER — DEXMEDETOMIDINE HYDROCHLORIDE 100 UG/ML
INJECTION, SOLUTION INTRAVENOUS AS NEEDED
Status: DISCONTINUED | OUTPATIENT
Start: 2022-11-21 | End: 2022-11-21 | Stop reason: SURG

## 2022-11-21 RX ORDER — ATROPINE SULFATE 0.4 MG/ML
AMPUL (ML) INJECTION AS NEEDED
Status: DISCONTINUED | OUTPATIENT
Start: 2022-11-21 | End: 2022-11-21 | Stop reason: SURG

## 2022-11-21 RX ORDER — PROPOFOL 10 MG/ML
VIAL (ML) INTRAVENOUS AS NEEDED
Status: DISCONTINUED | OUTPATIENT
Start: 2022-11-21 | End: 2022-11-21 | Stop reason: SURG

## 2022-11-21 RX ORDER — LIDOCAINE HYDROCHLORIDE 20 MG/ML
INJECTION, SOLUTION EPIDURAL; INFILTRATION; INTRACAUDAL; PERINEURAL AS NEEDED
Status: DISCONTINUED | OUTPATIENT
Start: 2022-11-21 | End: 2022-11-21 | Stop reason: SURG

## 2022-11-21 RX ORDER — SODIUM CHLORIDE, SODIUM LACTATE, POTASSIUM CHLORIDE, CALCIUM CHLORIDE 600; 310; 30; 20 MG/100ML; MG/100ML; MG/100ML; MG/100ML
30 INJECTION, SOLUTION INTRAVENOUS CONTINUOUS
Status: DISCONTINUED | OUTPATIENT
Start: 2022-11-21 | End: 2022-11-21 | Stop reason: HOSPADM

## 2022-11-21 RX ADMIN — SODIUM CHLORIDE, POTASSIUM CHLORIDE, SODIUM LACTATE AND CALCIUM CHLORIDE: 600; 310; 30; 20 INJECTION, SOLUTION INTRAVENOUS at 12:18

## 2022-11-21 RX ADMIN — DEXMEDETOMIDINE HYDROCHLORIDE 10 MCG: 100 INJECTION, SOLUTION, CONCENTRATE INTRAVENOUS at 12:20

## 2022-11-21 RX ADMIN — PROPOFOL 200 MG: 10 INJECTION, EMULSION INTRAVENOUS at 12:20

## 2022-11-21 RX ADMIN — ATROPINE SULFATE 0.4 MG: 0.4 INJECTION, SOLUTION INTRAMUSCULAR; INTRAVENOUS; SUBCUTANEOUS at 12:22

## 2022-11-21 RX ADMIN — PROPOFOL 175 MCG/KG/MIN: 10 INJECTION, EMULSION INTRAVENOUS at 12:20

## 2022-11-21 RX ADMIN — LIDOCAINE HYDROCHLORIDE 50 MG: 20 INJECTION, SOLUTION EPIDURAL; INFILTRATION; INTRACAUDAL; PERINEURAL at 12:20

## 2022-11-21 NOTE — ANESTHESIA POSTPROCEDURE EVALUATION
Patient: Malik Abbasi    Procedure Summary     Date: 11/21/22 Room / Location: [facility] ENDOSCOPY 3 / [facility] ENDOSCOPY    Anesthesia Start: 1218 Anesthesia Stop: 1232    Procedure: ESOPHAGOGASTRODUODENOSCOPY WITH BIOPSIES Diagnosis:       Gastroesophageal reflux disease without esophagitis      (Gastroesophageal reflux disease without esophagitis [K21.9])    Surgeons: Reese Mcintyre MD Provider: Darci Chen MD    Anesthesia Type: general ASA Status: 3          Anesthesia Type: general    Vitals  Vitals Value Taken Time   /91 11/21/22 1247   Temp 36.4 °C (97.5 °F) 11/21/22 1245   Pulse 58 11/21/22 1247   Resp 20 11/21/22 1245   SpO2 91 % 11/21/22 1247   Vitals shown include unvalidated device data.        Post Anesthesia Care and Evaluation    Patient location during evaluation: bedside  Patient participation: complete - patient participated  Level of consciousness: awake and alert  Pain management: adequate    Airway patency: patent  Anesthetic complications: No anesthetic complications  PONV Status: none  Cardiovascular status: acceptable  Respiratory status: acceptable  Hydration status: acceptable    Comments: An Anesthesiologist personally participated in the most demanding procedures (including induction and emergence if applicable) in the anesthesia plan, monitored the course of anesthesia administration at frequent intervals and remained physically present and available for immediate diagnosis and treatment of emergencies.

## 2022-11-21 NOTE — H&P
[facility]   HISTORY AND PHYSICAL    Patient Name: Malik Abbasi  : 1965  MRN: [id number]  Primary Care Physician:  Valeriano Abdalla PA  Date of admission: 2022    Subjective   Subjective     Chief Complaint: GERD    HPI:    Malik Abbasi is a 57 y.o. male who presents with worsening GERD symptoms.  He is on a proton pump inhibitor but is having breakthrough heartburn symptoms.    Review of Systems   Respiratory: Negative for shortness of breath.    Cardiovascular: Negative for chest pain.       Personal History     Past Medical History:   Diagnosis Date   • A-fib (HCC)    • Arthritis    • CHF (congestive heart failure) (HCC)    • GERD (gastroesophageal reflux disease)    • Hyperlipidemia    • Hypertension    • Irregular heartbeat    • Sleep apnea     cpap       Past Surgical History:   Procedure Laterality Date   • CARDIOVERSION     • CHOLECYSTECTOMY     • KNEE ARTHROSCOPY Left    • ROTATOR CUFF REPAIR Right 2019   • TRICEP TENDON REPAIR Right 2019       Family History: family history includes Heart disease in his maternal grandfather. Otherwise pertinent FHx was reviewed and not pertinent to current issue.    Social History:  reports that he quit smoking about 16 years ago. His smoking use included cigarettes. He smoked an average of 1.5 packs per day. He has never used smokeless tobacco. He reports that he does not currently use alcohol. He reports that he does not use drugs.    Home Medications:  allopurinol, aspirin, dronedarone, ergocalciferol, fenofibric acid, indomethacin, metoprolol succinate XL, olmesartan-hydrochlorothiazide, pantoprazole, and rivaroxaban    Allergies:  Allergies   Allergen Reactions   • Codeine Other (See Comments)     Syncope       Objective    Objective     Vitals:   Temp:  [98.5 °F (36.9 °C)] 98.5 °F (36.9 °C)  Heart Rate:  [52] 52  Resp:  [20] 20  BP: (155)/(105) 155/105    Physical Exam  Constitutional:       Appearance: Normal  appearance.   HENT:      Head: Normocephalic.   Cardiovascular:      Rate and Rhythm: Normal rate.   Pulmonary:      Effort: Pulmonary effort is normal.   Musculoskeletal:         General: Normal range of motion.      Cervical back: Normal range of motion.   Skin:     General: Skin is warm.   Neurological:      General: No focal deficit present.      Mental Status: He is alert.   Psychiatric:         Mood and Affect: Mood normal.         Result Review    Result Review:  I have personally reviewed the results from the time of this admission to 11/21/2022 11:18 EST and agree with these findings:  []  Laboratory  []  Microbiology  []  Radiology  []  EKG/Telemetry   []  Cardiology/Vascular   []  Pathology  []  Old records  []  Other:  Most notable findings include:     Assessment & Plan   Assessment / Plan     Brief Patient Summary:  Malik Abbasi is a 57 y.o. male who presents with GERD.    Active Hospital Problems:  Active Hospital Problems    Diagnosis    • **Gastroesophageal reflux disease without esophagitis        Plan:   We will proceed with an EGD.  Risk benefits alternatives were explained.    DVT prophylaxis:  No DVT prophylaxis order currently exists.    CODE STATUS:         Admission Status:  I believe this patient meets outpatient status.    Electronically signed by Reese Mcintyre MD, 11/21/22, 11:18 AM EST.

## 2022-11-21 NOTE — ANESTHESIA PREPROCEDURE EVALUATION
Anesthesia Evaluation     Patient summary reviewed and Nursing notes reviewed   no history of anesthetic complications:  NPO Solid Status: > 8 hours  NPO Liquid Status: > 2 hours           Airway   Mallampati: III  TM distance: >3 FB  Neck ROM: full  Large neck circumference  Dental      Pulmonary - normal exam    breath sounds clear to auscultation  (+) sleep apnea,   Cardiovascular - normal exam  Exercise tolerance: good (4-7 METS)    Rhythm: regular  Rate: normal    (+) hypertension, dysrhythmias, CHF , hyperlipidemia,       Neuro/Psych- negative ROS  GI/Hepatic/Renal/Endo    (+)  GERD well controlled,      Musculoskeletal     Abdominal    Substance History - negative use     OB/GYN negative ob/gyn ROS         Other   arthritis,      ROS/Med Hx Other: PAT Nursing Notes unavailable.                   Anesthesia Plan    ASA 3     general     (Total IV Anesthesia    Patient understands anesthesia not responsible for dental damage.  )  intravenous induction     Anesthetic plan, risks, benefits, and alternatives have been provided, discussed and informed consent has been obtained with: patient.  Pre-procedure education provided  Plan discussed with CRNA.        CODE STATUS:

## 2022-11-22 LAB
CYTO UR: NORMAL
LAB AP CASE REPORT: NORMAL
LAB AP CLINICAL INFORMATION: NORMAL
PATH REPORT.FINAL DX SPEC: NORMAL
PATH REPORT.GROSS SPEC: NORMAL

## 2022-12-09 ENCOUNTER — OFFICE VISIT (OUTPATIENT)
Dept: SURGERY | Facility: CLINIC | Age: 57
End: 2022-12-09

## 2022-12-09 VITALS — BODY MASS INDEX: 42.66 KG/M2 | WEIGHT: 315 LBS | HEIGHT: 72 IN | RESPIRATION RATE: 20 BRPM

## 2022-12-09 DIAGNOSIS — K21.9 GASTROESOPHAGEAL REFLUX DISEASE WITHOUT ESOPHAGITIS: Primary | ICD-10-CM

## 2022-12-09 PROCEDURE — 99212 OFFICE O/P EST SF 10 MIN: CPT | Performed by: SURGERY

## 2022-12-09 RX ORDER — TRIAMCINOLONE ACETONIDE 1 MG/G
CREAM TOPICAL
COMMUNITY
Start: 2022-10-27

## 2022-12-09 RX ORDER — EPINEPHRINE 0.3 MG/.3ML
INJECTION SUBCUTANEOUS
COMMUNITY
Start: 2022-10-27

## 2022-12-09 NOTE — PROGRESS NOTES
Chief Complaint  EGD and Post-op    Subjective          Malik Abbasi presents to [facility] GENERAL SURGERY  History of Present Illness    Malik Abbasi is a 57 y.o. male  who presents today for a postoperative visit.     Patient is here for a follow-up after a recent EGD done for chronic symptoms of GERD.  His scope look good grossly and his biopsies came back fine.  He is doing well and states that he usually has pretty good symptom control on his current medications.    Past History:  Medical History: has a past medical history of A-fib (HCC), Arthritis, CHF (congestive heart failure) (HCC), GERD (gastroesophageal reflux disease), Hyperlipidemia, Hypertension, Irregular heartbeat, and Sleep apnea.   Surgical History: has a past surgical history that includes Cholecystectomy (2006); Cardioversion (2013); Rotator cuff repair (Right, 2019); Tricep Tendon Repair (Right, 2019); Knee arthroscopy (Left); and Esophagogastroduodenoscopy (N/A, 11/21/2022).   Family History: family history includes Heart disease in his maternal grandfather.   Social History: reports that he quit smoking about 16 years ago. His smoking use included cigarettes. He smoked an average of 1.5 packs per day. He has never used smokeless tobacco. He reports that he does not currently use alcohol. He reports that he does not use drugs.  Allergies: Codeine       Current Outpatient Medications:   •  allopurinol (ZYLOPRIM) 300 MG tablet, Take 300 mg by mouth Daily., Disp: , Rfl:   •  aspirin 81 MG EC tablet, aspirin 81 mg tablet,delayed release  Take 1 tablet every day by oral route., Disp: , Rfl:   •  BENICAR HCT 40-25 MG per tablet, 1 tablet Daily., Disp: , Rfl:   •  EPINEPHrine (EPIPEN) 0.3 MG/0.3ML solution auto-injector injection, , Disp: , Rfl:   •  ergocalciferol (ERGOCALCIFEROL) 1.25 MG (44049 UT) capsule, Take 50,000 Units by mouth 1 (One) Time Per Week., Disp: , Rfl:   •  fenofibric acid (TRILIPIX) 135 MG  "capsule delayed-release delayed release capsule, 135 mg Daily., Disp: , Rfl:   •  indomethacin (INDOCIN) 50 MG capsule, Take 50 mg by mouth As Needed., Disp: , Rfl:   •  metoprolol succinate XL (TOPROL-XL) 50 MG 24 hr tablet, Take 50 mg by mouth Daily., Disp: , Rfl:   •  MULTAQ 400 MG tablet, 400 mg 2 (Two) Times a Day With Meals., Disp: , Rfl:   •  pantoprazole (PROTONIX) 40 MG EC tablet, Take 40 mg by mouth Daily., Disp: , Rfl:   •  triamcinolone (KENALOG) 0.1 % cream, , Disp: , Rfl:   •  XARELTO 20 MG tablet, Take 20 mg by mouth Daily With Dinner., Disp: , Rfl:        Physical Exam  He looks good today.  His abdomen is soft.  Objective     Vital Signs:   Resp 20   Ht 182.9 cm (72.01\")   Wt (!) 150 kg (331 lb)   BMI 44.88 kg/m²              Assessment and Plan    Diagnoses and all orders for this visit:    1. Gastroesophageal reflux disease without esophagitis (Primary)    I will see him back on an as-needed basis.  Have asked him to call me should he have any further problems.      "

## 2023-03-17 ENCOUNTER — TELEPHONE (OUTPATIENT)
Dept: ORTHOPEDIC SURGERY | Facility: CLINIC | Age: 58
End: 2023-03-17
Payer: OTHER GOVERNMENT

## 2023-03-17 NOTE — TELEPHONE ENCOUNTER
LEFT ELBOW, XRAY REPORT IN CARE EVERYWHERE SAW ADRIANNA LEIVA YESTERDAY. NOTES MENTION PATIENT FOOSH  A MONTH AGO UNABLE TO EXTEND ARM. XRAY FINDINGS:Comminuted fracture of the left radial head with intra-articular extension. There is a small elbow effusion.

## 2023-03-21 ENCOUNTER — OFFICE VISIT (OUTPATIENT)
Dept: ORTHOPEDIC SURGERY | Facility: CLINIC | Age: 58
End: 2023-03-21
Payer: OTHER GOVERNMENT

## 2023-03-21 VITALS — WEIGHT: 315 LBS | HEIGHT: 72 IN | BODY MASS INDEX: 42.66 KG/M2

## 2023-03-21 DIAGNOSIS — S52.125A CLOSED NONDISPLACED FRACTURE OF HEAD OF LEFT RADIUS, INITIAL ENCOUNTER: ICD-10-CM

## 2023-03-21 DIAGNOSIS — M25.522 LEFT ELBOW PAIN: Primary | ICD-10-CM

## 2023-03-21 PROCEDURE — 99213 OFFICE O/P EST LOW 20 MIN: CPT | Performed by: ORTHOPAEDIC SURGERY

## 2023-03-21 NOTE — PROGRESS NOTES
"Chief Complaint  Initial Evaluation of the Left Elbow     Subjective      Malik Abbasi presents to [facility] for initial evaluation of the left elbow. He has was out at the pool and fell on his concrete deck about 6 weeks ago.   His PCP ordered an X ray and here today to review results.  He is retired.      Allergies   Allergen Reactions   • Codeine Other (See Comments)     Syncope        Social History     Socioeconomic History   • Marital status:    Tobacco Use   • Smoking status: Former     Packs/day: 1.50     Types: Cigarettes     Quit date: 2006     Years since quittin.5   • Smokeless tobacco: Never   Vaping Use   • Vaping Use: Never used   Substance and Sexual Activity   • Alcohol use: Not Currently     Comment: rarely   • Drug use: No   • Sexual activity: Defer        Review of Systems     Objective   Vital Signs:   Ht 182.9 cm (72\")   Wt (!) 150 kg (331 lb)   BMI 44.89 kg/m²       Physical Exam  Constitutional:       Appearance: Normal appearance. Patient is well-developed and normal weight.   HENT:      Head: Normocephalic.      Right Ear: Hearing and external ear normal.      Left Ear: Hearing and external ear normal.      Nose: Nose normal.   Eyes:      Conjunctiva/sclera: Conjunctivae normal.   Cardiovascular:      Rate and Rhythm: Normal rate.   Pulmonary:      Effort: Pulmonary effort is normal.      Breath sounds: No wheezing or rales.   Abdominal:      Palpations: Abdomen is soft.      Tenderness: There is no abdominal tenderness.   Musculoskeletal:      Cervical back: Normal range of motion.   Skin:     Findings: No rash.   Neurological:      Mental Status: Patient is alert and oriented to person, place, and time.   Psychiatric:         Mood and Affect: Mood and affect normal.         Judgment: Judgment normal.       Ortho Exam      LEFT ELBOW  Sensation to light touch median, radial, ulnar nerve. Positive AIN, PIN, ulnar nerve motor " function. Axillary sensation intact. Elbow  -5 -120 ROM Full , thumb opposition, MCP flexors, DIP flexors and PIP flexors. Full wrist flexion/extension.       Procedures      Imaging Results (Most Recent)     Procedure Component Value Units Date/Time    XR Elbow 2 View Left [[id number]] Resulted: 03/21/23 1000     Updated: 03/21/23 1001           Result Review :     X-Ray Report:  Left elbow X-Ray  Indication: Evaluation of the left elbow  AP/Lateral view(s)  Findings:  Radial head with intra-articular extension.  Prior studies available for comparison:Yes           Assessment and Plan     Diagnoses and all orders for this visit:    1. Left elbow pain (Primary)  -     XR Elbow 2 View Left    2. Closed nondisplaced fracture of head of left radius, initial encounter        Discussed the treatment plan with the patient. I reviewed the X-rays that were obtained today with the patient. Prescribed physical therapy. HEP exercises.     Call or return if worsening symptoms.    Follow Up     3 weeks X ray.       Patient was given instructions and counseling regarding his condition or for health maintenance advice. Please see specific information pulled into the AVS if appropriate.     Scribed for Juanita Saldaña MD by Sherry Sheikh MA.  03/21/23   10:04 EDT    I have personally performed the services described in this document as scribed by the above individual and it is both accurate and complete. Juanita Saldaña MD 03/21/23

## 2023-03-22 ENCOUNTER — TREATMENT (OUTPATIENT)
Dept: PHYSICAL THERAPY | Facility: CLINIC | Age: 58
End: 2023-03-22
Payer: OTHER GOVERNMENT

## 2023-03-22 DIAGNOSIS — M25.522 LEFT ELBOW PAIN: ICD-10-CM

## 2023-03-22 DIAGNOSIS — S52.125S CLOSED NONDISPLACED FRACTURE OF HEAD OF LEFT RADIUS, SEQUELA: Primary | ICD-10-CM

## 2023-03-22 DIAGNOSIS — M25.622 ELBOW STIFFNESS, LEFT: ICD-10-CM

## 2023-03-22 PROCEDURE — 97165 OT EVAL LOW COMPLEX 30 MIN: CPT | Performed by: OCCUPATIONAL THERAPIST

## 2023-03-22 NOTE — PROGRESS NOTES
Occupational Therapy Initial Evaluation and Plan of Care  [address], [address]   [place]  [address]      Patient: Malik Abbasi   : 1965  Diagnosis/ICD-10 Code:  Closed nondisplaced fracture of head of left radius, sequela [S52.125S]  Referring practitioner: Juanita Saldaña MD  Date of Initial Visit: 3/22/2023  Today's Date: 3/22/2023  Patient seen for 1 sessions               Subjective Evaluation    History of Present Illness  Mechanism of injury: Closed non-displaced radius fracture due to a FOOSH 2023      Patient Occupation: Retired.   Likes to play video games Pain  Current pain ratin  At best pain ratin  Quality: dull ache    Hand dominance: right    Diagnostic Tests  X-ray: abnormal    Patient Goals  Patient goals for therapy: decreased pain, increased motion, increased strength and return to sport/leisure activities  Patient goal: to improve ROM         Quick Dash 20/55 1-19% functional limitation    Objective          Tenderness     Additional Tenderness Details  No tenderness noted    Neurological Testing     Sensation     Elbow   Left Elbow   Intact: light touch    Active Range of Motion     Left Elbow   Flexion: 120 degrees   Extension: -25 degrees with pain  Forearm supination: WFL  Forearm pronation: WFL    Strength/Myotome Testing     Left Elbow   Extension: 4 and WFL  Forearm supination: WFL  Forearm pronation: WFL    Swelling   Left Elbow Girth Measurements   Joint line: 33 cm    Right Elbow Girth Measurements   Joint line: 32 cm          Assessment & Plan     Assessment  Impairments: abnormal coordination, abnormal muscle tone, abnormal or restricted ROM, activity intolerance, impaired physical strength, lacks appropriate home exercise program and pain with function  Functional Limitations: carrying objects, lifting, sleeping, pulling, pushing, uncomfortable because of pain, reaching behind back, reaching overhead and unable to perform repetitive tasksPrognosis:  good    Goals  Plan Goals: Elbow pain and tenderness  LTG 1  12 weeks:  Decrease elbow pain to 0/10 to improve ADL status  Status:  New  STG 1  6 weeks:  Decrease elbow pain to 1/10   Status:  New    2.  Decreased elbow and forearm AROM  LTG 2  12 weeks:  Increase elbow AROM to 0/140  to improve ability to perform ADL's  Status:  New  STG 2  6 weeks:  Increase elbow AROM to -10/130   Status:  New    3.  Decreased arm and hand strength  S and LTG 3  12 weeks:  Increased strength to 5/5 MMT to improve ability to reach, lift, carry and handle objects  Status:  New      4.  Decreased functional use of the arm and hand  LTG 4  12 weeks:  Improved function score to 11/55 or better on Quick Dash  Status:  New  STG 4  6 weeks:  Improved function score to 15/55 or better on Quick Dash  Status:  New       Plan  Planned modality interventions: iontophoresis, contrast bath immersion, cryotherapy, electrical stimulation/Russian stimulation, hydrotherapy, TENS, thermotherapy (hydrocollator packs), thermotherapy (paraffin bath) and ultrasound  Planned therapy interventions: ADL retraining, balance/weight-bearing training, body mechanics training, compression, fine motor coordination training, flexibility, manual therapy, neuromuscular re-education, motor coordination training, orthotic fitting/training, postural training, soft tissue mobilization, spinal/joint mobilization, strengthening, stretching, IADL retraining, home exercise program, functional ROM exercises and therapeutic activities  Frequency: 2x week  Duration in weeks: 4  Treatment plan discussed with: patient        Patient will be seen for skilled OT services    Visit Diagnoses:    ICD-10-CM ICD-9-CM   1. Closed nondisplaced fracture of head of left radius, sequela  S52.125S 905.2   2. Elbow stiffness, left  M25.622 719.52   3. Left elbow pain  M25.522 719.42       Timed:  Manual Therapy:                 0     mins  57798  Therapeutic Exercise:      0     mins  40469      Neuromuscular reeducation       0     mins 33617  Therapeutic Activity              0     mins  32423  Ultrasound:                  0     mins  55790     Untimed:  Low eval:                       40     mins  96839  Mod eval                        0     mins  15780  Electrical Stimulation:    0     mins  17324 ( );  Fuidotherapy     0     mins  90594      Timed Treatment:   0   mins   Total Treatment:     40   mins    OT SIGNATURE: Eve Wahl OT, CHT  Electronic Signature  [place] license #: [id number]      Initial Certification  Certification Period: 3/22/2023 thru 6/19/2023  I certify that the therapy services are furnished while this patient is under my care.  The services outlined above are required by this patient, and will be reviewed every 90 days.     PHYSICIAN: Juanita Saldaña MD   NPI: [id number]     DATE:       Please sign and return via fax to [phone].. Thank you, [facility].

## 2023-03-23 DIAGNOSIS — S52.125A CLOSED NONDISPLACED FRACTURE OF HEAD OF LEFT RADIUS, INITIAL ENCOUNTER: ICD-10-CM

## 2023-03-23 DIAGNOSIS — M25.522 LEFT ELBOW PAIN: Primary | ICD-10-CM

## 2023-03-27 ENCOUNTER — TREATMENT (OUTPATIENT)
Dept: PHYSICAL THERAPY | Facility: CLINIC | Age: 58
End: 2023-03-27
Payer: OTHER GOVERNMENT

## 2023-03-27 DIAGNOSIS — M25.622 ELBOW STIFFNESS, LEFT: ICD-10-CM

## 2023-03-27 DIAGNOSIS — M25.522 LEFT ELBOW PAIN: ICD-10-CM

## 2023-03-27 DIAGNOSIS — S52.125S CLOSED NONDISPLACED FRACTURE OF HEAD OF LEFT RADIUS, SEQUELA: Primary | ICD-10-CM

## 2023-03-27 PROCEDURE — 97110 THERAPEUTIC EXERCISES: CPT | Performed by: OCCUPATIONAL THERAPIST

## 2023-03-27 PROCEDURE — 97530 THERAPEUTIC ACTIVITIES: CPT | Performed by: OCCUPATIONAL THERAPIST

## 2023-03-27 NOTE — PROGRESS NOTES
Occupational Therapy Daily Treatment Note  Theresa OT: [address] [place] [address]      Patient: Malik Abbasi   : 1965  Diagnosis/ICD-10 Code:  Closed nondisplaced fracture of head of left radius, sequela [S52.125S]  Referring practitioner: No ref. provider found  Date of Initial Visit: Type: THERAPY  Noted: 3/22/2023  Today's Date: 3/27/2023  Patient seen for 2 sessions             Subjective   Malik Abbasi reports: 1-2/10 pain with self PROM. Pt reports no pain at rest.    Objective     See Exercise, Manual, and Modality Logs for complete treatment.       Assessment/Plan  Pt tolerated well with no c/o increased pain. Pt displays improved AROM after completion of AA exercises. Discussed holding stretches 40 seconds x 6 reps for improved ROM. Pt verbalizes understanding.  Progress per Plan of Care           Timed:  Therapeutic Exercise:    15     mins  02376;       Therapeutic Activity:     8     mins  68877;       Timed Treatment:   23   mins   Total Treatment:     23   mins        Jonathon Hanson OT  Occupational Therapist  [place] License: [id number]  NPI: [id number]

## 2023-03-29 ENCOUNTER — TREATMENT (OUTPATIENT)
Dept: PHYSICAL THERAPY | Facility: CLINIC | Age: 58
End: 2023-03-29
Payer: OTHER GOVERNMENT

## 2023-03-29 DIAGNOSIS — M25.522 LEFT ELBOW PAIN: ICD-10-CM

## 2023-03-29 DIAGNOSIS — M25.622 ELBOW STIFFNESS, LEFT: ICD-10-CM

## 2023-03-29 DIAGNOSIS — S52.125S CLOSED NONDISPLACED FRACTURE OF HEAD OF LEFT RADIUS, SEQUELA: Primary | ICD-10-CM

## 2023-03-29 PROCEDURE — 97110 THERAPEUTIC EXERCISES: CPT | Performed by: OCCUPATIONAL THERAPIST

## 2023-03-29 PROCEDURE — 97530 THERAPEUTIC ACTIVITIES: CPT | Performed by: OCCUPATIONAL THERAPIST

## 2023-03-29 NOTE — PROGRESS NOTES
Occupational Therapy Daily Treatment Note  Theresa OT: [address] [place] [address]      Patient: Malik Abbasi   : 1965  Diagnosis/ICD-10 Code:  Closed nondisplaced fracture of head of left radius, sequela [S52.125S]  Referring practitioner: No ref. provider found  Date of Initial Visit: Type: THERAPY  Noted: 3/22/2023  Today's Date: 3/29/2023  Patient seen for 3 sessions             Subjective   Malik Abbasi reports: No current pain.    Objective     See Exercise, Manual, and Modality Logs for complete treatment.       Assessment/Plan  Pt tolerated well with no c/o increased pain. Pt displays improved strength this date.  Progress per Plan of Care           Timed:  Therapeutic Exercise:    15     mins  81035;     Therapeutic Activity:     8     mins  61241;       Timed Treatment:   23   mins   Total Treatment:     23   mins        Jonathon Hanson OT  Occupational Therapist  [place] License: [id number]  NPI: [id number]

## 2023-04-04 ENCOUNTER — TREATMENT (OUTPATIENT)
Dept: PHYSICAL THERAPY | Facility: CLINIC | Age: 58
End: 2023-04-04
Payer: OTHER GOVERNMENT

## 2023-04-04 DIAGNOSIS — M25.622 ELBOW STIFFNESS, LEFT: ICD-10-CM

## 2023-04-04 DIAGNOSIS — S52.125S CLOSED NONDISPLACED FRACTURE OF HEAD OF LEFT RADIUS, SEQUELA: Primary | ICD-10-CM

## 2023-04-04 DIAGNOSIS — M25.522 LEFT ELBOW PAIN: ICD-10-CM

## 2023-04-04 NOTE — PROGRESS NOTES
Occupational Therapy Daily Treatment Note  [address] [facility]   [place]  [address]      Patient: Malik Abbasi   : 1965  Referring practitioner: Juanita Saldaña MD  Date of Initial Visit: Type: THERAPY  Noted: 3/22/2023  Today's Date: 2023  Patient seen for 4 sessions           Subjective Evaluation    History of Present Illness  Mechanism of injury: Closed non-displaced radius fracture      Patient Occupation: Retired.   Likes to play video games Pain  Current pain ratin  At best pain ratin  Quality: dull ache    Hand dominance: right    Diagnostic Tests  X-ray: abnormal    Patient Goals  Patient goal: to improve ROM           Objective          Active Range of Motion     Left Elbow   Flexion: 130 degrees   Extension: -15 degrees with pain  Forearm supination: WFL  Forearm pronation: WFL      See Exercise, Manual, and Modality Logs for complete treatment.       Assessment & Plan     Assessment    Assessment details: Good tolerance to PROM and increase in resistive exercises.  Reports some pain with end range elbow extension at the biceps insertion.  Continue with plan of care.        Visit Diagnoses:    ICD-10-CM ICD-9-CM   1. Closed nondisplaced fracture of head of left radius, sequela  S52.125S 905.2   2. Elbow stiffness, left  M25.622 719.52   3. Left elbow pain  M25.522 719.42       Progress per Plan of Care           Timed:  Manual Therapy:                 8     mins  71266  Therapeutic Exercise:      10     mins  51152     Neuromuscular reeducation       0     mins 47787  Therapeutic Activity              8     mins  30737  Ultrasound:                  0     mins  86262     Untimed:  Electrical Stimulation:    0     mins  88262 ( );  Fluidotherapy      0     mins  93935    Timed Treatment:   26   mins   Total Treatment:     26   mins    Eve Wahl OT, CHT  Occupational Therapist    Electronically signed      [place] license #: [id number]

## 2023-04-06 ENCOUNTER — TREATMENT (OUTPATIENT)
Dept: PHYSICAL THERAPY | Facility: CLINIC | Age: 58
End: 2023-04-06
Payer: OTHER GOVERNMENT

## 2023-04-06 DIAGNOSIS — S52.125S CLOSED NONDISPLACED FRACTURE OF HEAD OF LEFT RADIUS, SEQUELA: Primary | ICD-10-CM

## 2023-04-06 DIAGNOSIS — M25.522 LEFT ELBOW PAIN: ICD-10-CM

## 2023-04-06 DIAGNOSIS — M25.622 ELBOW STIFFNESS, LEFT: ICD-10-CM

## 2023-04-06 PROCEDURE — 97530 THERAPEUTIC ACTIVITIES: CPT | Performed by: OCCUPATIONAL THERAPIST

## 2023-04-06 PROCEDURE — 97110 THERAPEUTIC EXERCISES: CPT | Performed by: OCCUPATIONAL THERAPIST

## 2023-04-06 NOTE — PROGRESS NOTES
Re-Assessment / Re-Certification      Patient: Malik Abbasi   : 1965  Diagnosis/ICD-10 Code:  Closed nondisplaced fracture of head of left radius, sequela [S52.125S]  Referring practitioner: Juanita Saldaña MD  Date of Initial Visit: Type: THERAPY  Noted: 3/22/2023  Today's Date: 2023  Patient seen for 5 sessions      Subjective:     Quick Dash 15/55 1-19% functional limitation  Clinical Progress: improved  Home Program Compliance: Yes  Treatment has included: therapeutic exercise and therapeutic activity    Subjective Evaluation    History of Present Illness  Mechanism of injury: Closed non-displaced radius fracture      Patient Occupation: Retired.   Likes to play video games Pain  Current pain ratin  At best pain ratin  Quality: dull ache    Hand dominance: right    Diagnostic Tests  X-ray: abnormal    Patient Goals  Patient goal: to improve ROM       Objective          Tenderness     Additional Tenderness Details  No tenderness noted    Neurological Testing     Sensation     Elbow   Left Elbow   Intact: light touch    Active Range of Motion     Left Elbow   Flexion: 125 degrees   Extension: -15 degrees with pain  Forearm supination: WFL  Forearm pronation: WFL    Strength/Myotome Testing     Left Elbow   Flexion: 5  Extension: 5  Forearm supination: 5  Forearm pronation: 5    Left Wrist/Hand      (2nd hand position)     Trial 1: 100 lbs    Trial 2: 110 lbs    Trial 3: 100 lbs    Average: 103.33 lbs    Right Wrist/Hand      (2nd hand position)     Trial 1: 100 lbs    Trial 2: 102 lbs    Trial 3: 95 lbs    Average: 99 lbs    Swelling   Left Elbow Girth Measurements   Joint line: 33 cm    Right Elbow Girth Measurements   Joint line: 32 cm      Assessment & Plan     Assessment  Impairments: abnormal or restricted ROM  Functional Limitations: lifting, pushing and reaching behind back  Assessment details: Partially met goals.  Ready for HEP  Prognosis: good    Goals  Plan Goals:  Elbow pain and tenderness  LTG 1  12 weeks:  Decrease elbow pain to 0/10 to improve ADL status  Status:  Not met  STG 1  6 weeks:  Decrease elbow pain to 1/10   Status:  Met    2.  Decreased elbow and forearm AROM  LTG 2  12 weeks:  Increase elbow AROM to 0/140  to improve ability to perform ADL's  Status:  Not met  STG 2  6 weeks:  Increase elbow AROM to -10/130   Status:  Not met but made good progress    3.  Decreased arm and hand strength  S and LTG 3  12 weeks:  Increased strength to 5/5 MMT to improve ability to reach, lift, carry and handle objects  Status:  Met      4.  Decreased functional use of the arm and hand  LTG 4  12 weeks:  Improved function score to 11/55 or better on Quick Dash  Status:  Not met  STG 4  6 weeks:  Improved function score to 15/55 or better on Quick Dash  Status:  Met       Plan  Planned therapy interventions: flexibility, strengthening, stretching, home exercise program, functional ROM exercises and therapeutic activities  Duration in visits: 1  Treatment plan discussed with: patient  Plan details: Reviewed HEP to include general L UE strengthening.  Provided T band for home use.        Visit Diagnoses:    ICD-10-CM ICD-9-CM   1. Closed nondisplaced fracture of head of left radius, sequela  S52.125S 905.2   2. Elbow stiffness, left  M25.622 719.52   3. Left elbow pain  M25.522 719.42       Progress toward previous goals: Partially Met       Recommendations: Discharge  Timeframe: Discharge to [facility] at this time.  Prognosis to achieve goals: good    OT Signature: Eve Wahl OT, CHT    Electronically signed    [place] license #: [id number]    Based upon review of the patient's progress and therapy plan, it is my medical opinion that Malik Abbasi should continue with HEP and be discharged from Occupational therapy  from [facility]  [address] [place] [id number] [phone].    Signature: __________________________________  Piotr  Juanita LAFLEUR MD  NPI: [id number]    Timed:  Manual Therapy:                 0     mins  69597  Therapeutic Exercise:      15     mins  43724     Neuromuscular reeducation       0     mins 84171  Therapeutic Activity              10     mins  38531  Ultrasound:                  0     mins  86033      Untimed:  Electrical Stimulation:    0     mins  13142 ( )  Fluidotherapy     0     mins  57316    Timed Treatment:   25   mins   Total Treatment:     25   mins

## 2023-04-28 ENCOUNTER — OFFICE VISIT (OUTPATIENT)
Dept: ORTHOPEDIC SURGERY | Facility: CLINIC | Age: 58
End: 2023-04-28
Payer: OTHER GOVERNMENT

## 2023-04-28 VITALS — WEIGHT: 315 LBS | BODY MASS INDEX: 42.66 KG/M2 | HEIGHT: 72 IN | HEART RATE: 57 BPM | OXYGEN SATURATION: 99 %

## 2023-04-28 DIAGNOSIS — S52.125D CLOSED NONDISPLACED FRACTURE OF HEAD OF LEFT RADIUS WITH ROUTINE HEALING, SUBSEQUENT ENCOUNTER: ICD-10-CM

## 2023-04-28 DIAGNOSIS — M25.522 LEFT ELBOW PAIN: Primary | ICD-10-CM

## 2023-04-28 RX ORDER — ASPIRIN 81 MG/1
TABLET ORAL EVERY 24 HOURS
COMMUNITY

## 2023-04-28 RX ORDER — AMLODIPINE BESYLATE 5 MG/1
5 TABLET ORAL
COMMUNITY

## 2023-04-28 RX ORDER — ALLOPURINOL 300 MG/1
300 TABLET ORAL DAILY
COMMUNITY

## 2023-04-28 NOTE — PROGRESS NOTES
"Chief Complaint  Follow-up and Pain of the Left Elbow    Subjective      Malik Abbasi presents to [facility] for follow-up of left radial head fracture sustained in fall around mid February.  Patient reports fall forward, directly onto bilateral outstretched hands and noted onset of elbow pain.  He did wait approximately 6 weeks prior to seeking evaluation.  PCP did order x-rays which revealed left radial head fracture with intra-articular extension.  He was initially evaluated in office on 3/21/2023 and educated on gentle ROM, provided home exercises, and received referral to outpatient physical therapy.  Patient reports today for follow-up stating that he attended outpatient physical therapy x4 sessions, however, has since discontinued this.  Reports he felt he could \"do the exercises on my own\".  He has continued home exercise program.  Does still have pain if he \"overdoes it\".  Does report that he was recently raking up rocks which caused increased pain.  Pain is localized to the left AC joint/proximal radius.    Objective   Allergies   Allergen Reactions   • Codeine Other (See Comments)     Syncope       Vital Signs:   Pulse 57   Ht 182.9 cm (72\")   Wt (!) 150 kg (331 lb)   SpO2 99%   BMI 44.89 kg/m²       Physical Exam    Constitutional: Awake, alert. Well nourished appearance.    Integumentary: Warm, dry, intact. No obvious rashes.    HENT: Atraumatic, normocephalic.   Respiratory: Non labored respirations .   Cardiovascular: Intact peripheral pulses.    Psychiatric: Normal mood and affect. A&O X3    Ortho Exam  Left elbow: Skin is warm, dry, and intact.  Mild tenderness to palpation of left proximal radius.  Pain reported with pronation and supination.  Patient is lacking 15 degrees of extension.  Full elbow flexion.  Full wrist flexion and extension.  Good thumb opposition.  Sensation intact light touch.  Distal neurovascular intact.    Imaging Results (Most " Recent)     Procedure Component Value Units Date/Time    XR Elbow 2 View Left [[id number]] Resulted: 05/01/23 0822     Updated: 05/01/23 0823    Narrative:      X-Ray Report:  Study: X-rays ordered, taken in the office, and reviewed today.   Site: Left elbow xray  Indication: Fracture  View: AP/Lateral view(s)  Findings: Well-healing fracture of the left radial head, nondisplaced.  Prior studies available for comparison: yes                     Assessment and Plan   Problem List Items Addressed This Visit    None  Visit Diagnoses     Left elbow pain    -  Primary    Relevant Orders    XR Elbow 2 View Left (Completed)    Closed nondisplaced fracture of head of left radius with routine healing, subsequent encounter            Follow Up   Return in about 3 weeks (around 5/19/2023).  Patient is a former smoker.  Encouraged continued tobacco cessation.  Did not discuss options for tobacco cessation.    Patient Instructions   X-rays taken and reviewed. Advised continued home exercises. He does not wish to return to PT. No heavy lifting, pushing, or pulling. Continue icing and elevation as needed.     Follow up in 3 weeks. Call with changes or concerns.     Patient was given instructions and counseling regarding his condition or for health maintenance advice. Please see specific information pulled into the AVS if appropriate.

## 2023-04-28 NOTE — PATIENT INSTRUCTIONS
X-rays taken and reviewed. Advised continued home exercises. He does not wish to return to PT. No heavy lifting, pushing, or pulling. Continue icing and elevation as needed.     Follow up in 3 weeks. Call with changes or concerns.

## 2023-05-23 ENCOUNTER — OFFICE VISIT (OUTPATIENT)
Dept: ORTHOPEDIC SURGERY | Facility: CLINIC | Age: 58
End: 2023-05-23
Payer: OTHER GOVERNMENT

## 2023-05-23 VITALS — HEIGHT: 72 IN | WEIGHT: 315 LBS | BODY MASS INDEX: 42.66 KG/M2

## 2023-05-23 DIAGNOSIS — S52.125D CLOSED NONDISPLACED FRACTURE OF HEAD OF LEFT RADIUS WITH ROUTINE HEALING, SUBSEQUENT ENCOUNTER: ICD-10-CM

## 2023-05-23 DIAGNOSIS — M25.522 LEFT ELBOW PAIN: Primary | ICD-10-CM

## 2023-05-23 NOTE — PATIENT INSTRUCTIONS
X-RAY taken and reviewed. Patient continues to have significant L elbow pain. Discussed options of PT, HEP, NSAIDs, vs MRI. Patient declines return to PT. He would like to proceed with MRI. Order placed.    Follow up MRI results. Call with changes or concerns.

## 2024-07-25 ENCOUNTER — HOSPITAL ENCOUNTER (OUTPATIENT)
Facility: HOSPITAL | Age: 59
Setting detail: OBSERVATION
LOS: 1 days | Discharge: HOME | End: 2024-07-27
Attending: EMERGENCY MEDICINE | Admitting: STUDENT IN AN ORGANIZED HEALTH CARE EDUCATION/TRAINING PROGRAM
Payer: OTHER GOVERNMENT

## 2024-07-25 ENCOUNTER — APPOINTMENT (OUTPATIENT)
Dept: CARDIOLOGY | Facility: HOSPITAL | Age: 59
End: 2024-07-25
Payer: OTHER GOVERNMENT

## 2024-07-25 ENCOUNTER — APPOINTMENT (OUTPATIENT)
Dept: GENERAL RADIOLOGY | Facility: HOSPITAL | Age: 59
End: 2024-07-25
Payer: OTHER GOVERNMENT

## 2024-07-25 DIAGNOSIS — I48.91 RAPID ATRIAL FIBRILLATION: Primary | ICD-10-CM

## 2024-07-25 LAB
ALBUMIN SERPL-MCNC: 4.3 G/DL (ref 3.5–5.2)
ALBUMIN/GLOB SERPL: 1.3 G/DL
ALP SERPL-CCNC: 84 U/L (ref 39–117)
ALT SERPL W P-5'-P-CCNC: 27 U/L (ref 1–41)
ANION GAP SERPL CALCULATED.3IONS-SCNC: 15 MMOL/L (ref 5–15)
AST SERPL-CCNC: 26 U/L (ref 1–40)
BASOPHILS # BLD AUTO: 0.08 10*3/MM3 (ref 0–0.2)
BASOPHILS NFR BLD AUTO: 1 % (ref 0–1.5)
BILIRUB SERPL-MCNC: 0.8 MG/DL (ref 0–1.2)
BUN SERPL-MCNC: 21 MG/DL (ref 8–23)
BUN/CREAT SERPL: 19.3 (ref 7–25)
CALCIUM SPEC-SCNC: 9.4 MG/DL (ref 8.6–10.5)
CHLORIDE SERPL-SCNC: 101 MMOL/L (ref 98–107)
CO2 SERPL-SCNC: 23 MMOL/L (ref 22–29)
CREAT SERPL-MCNC: 1.09 MG/DL (ref 0.76–1.27)
DEPRECATED RDW RBC AUTO: 40.7 FL (ref 37–54)
EGFRCR SERPLBLD CKD-EPI 2021: 77.2 ML/MIN/1.73
EOSINOPHIL # BLD AUTO: 0.14 10*3/MM3 (ref 0–0.4)
EOSINOPHIL NFR BLD AUTO: 1.7 % (ref 0.3–6.2)
ERYTHROCYTE [DISTWIDTH] IN BLOOD BY AUTOMATED COUNT: 13.2 % (ref 12.3–15.4)
GEN 5 2HR TROPONIN T REFLEX: 9 NG/L
GLOBULIN UR ELPH-MCNC: 3.3 GM/DL
GLUCOSE SERPL-MCNC: 104 MG/DL (ref 65–99)
HCT VFR BLD AUTO: 49.2 % (ref 37.5–51)
HGB BLD-MCNC: 17.3 G/DL (ref 13–17.7)
HOLD SPECIMEN: NORMAL
HOLD SPECIMEN: NORMAL
IMM GRANULOCYTES # BLD AUTO: 0.02 10*3/MM3 (ref 0–0.05)
IMM GRANULOCYTES NFR BLD AUTO: 0.2 % (ref 0–0.5)
LIPASE SERPL-CCNC: 26 U/L (ref 13–60)
LYMPHOCYTES # BLD AUTO: 1.98 10*3/MM3 (ref 0.7–3.1)
LYMPHOCYTES NFR BLD AUTO: 24.2 % (ref 19.6–45.3)
MAGNESIUM SERPL-MCNC: 2 MG/DL (ref 1.6–2.4)
MCH RBC QN AUTO: 30.4 PG (ref 26.6–33)
MCHC RBC AUTO-ENTMCNC: 35.2 G/DL (ref 31.5–35.7)
MCV RBC AUTO: 86.5 FL (ref 79–97)
MONOCYTES # BLD AUTO: 0.49 10*3/MM3 (ref 0.1–0.9)
MONOCYTES NFR BLD AUTO: 6 % (ref 5–12)
NEUTROPHILS NFR BLD AUTO: 5.47 10*3/MM3 (ref 1.7–7)
NEUTROPHILS NFR BLD AUTO: 66.9 % (ref 42.7–76)
NRBC BLD AUTO-RTO: 0 /100 WBC (ref 0–0.2)
NT-PROBNP SERPL-MCNC: 298.1 PG/ML (ref 0–900)
PLATELET # BLD AUTO: 262 10*3/MM3 (ref 140–450)
PMV BLD AUTO: 10.2 FL (ref 6–12)
POTASSIUM SERPL-SCNC: 3.9 MMOL/L (ref 3.5–5.2)
PROT SERPL-MCNC: 7.6 G/DL (ref 6–8.5)
QT INTERVAL: 359 MS
QTC INTERVAL: 512 MS
RBC # BLD AUTO: 5.69 10*6/MM3 (ref 4.14–5.8)
SODIUM SERPL-SCNC: 139 MMOL/L (ref 136–145)
TROPONIN T DELTA: -1 NG/L
TROPONIN T SERPL HS-MCNC: 10 NG/L
TSH SERPL DL<=0.05 MIU/L-ACNC: 4.3 UIU/ML (ref 0.27–4.2)
WBC NRBC COR # BLD AUTO: 8.18 10*3/MM3 (ref 3.4–10.8)
WHOLE BLOOD HOLD COAG: NORMAL
WHOLE BLOOD HOLD SPECIMEN: NORMAL

## 2024-07-25 PROCEDURE — 85025 COMPLETE CBC W/AUTO DIFF WBC: CPT

## 2024-07-25 PROCEDURE — 96376 TX/PRO/DX INJ SAME DRUG ADON: CPT

## 2024-07-25 PROCEDURE — 99285 EMERGENCY DEPT VISIT HI MDM: CPT

## 2024-07-25 PROCEDURE — 96366 THER/PROPH/DIAG IV INF ADDON: CPT

## 2024-07-25 PROCEDURE — 99291 CRITICAL CARE FIRST HOUR: CPT

## 2024-07-25 PROCEDURE — 93010 ELECTROCARDIOGRAM REPORT: CPT | Performed by: INTERNAL MEDICINE

## 2024-07-25 PROCEDURE — 84443 ASSAY THYROID STIM HORMONE: CPT | Performed by: STUDENT IN AN ORGANIZED HEALTH CARE EDUCATION/TRAINING PROGRAM

## 2024-07-25 PROCEDURE — 99222 1ST HOSP IP/OBS MODERATE 55: CPT | Performed by: STUDENT IN AN ORGANIZED HEALTH CARE EDUCATION/TRAINING PROGRAM

## 2024-07-25 PROCEDURE — G0378 HOSPITAL OBSERVATION PER HR: HCPCS

## 2024-07-25 PROCEDURE — 93306 TTE W/DOPPLER COMPLETE: CPT

## 2024-07-25 PROCEDURE — 83880 ASSAY OF NATRIURETIC PEPTIDE: CPT

## 2024-07-25 PROCEDURE — 94799 UNLISTED PULMONARY SVC/PX: CPT

## 2024-07-25 PROCEDURE — 84484 ASSAY OF TROPONIN QUANT: CPT | Performed by: EMERGENCY MEDICINE

## 2024-07-25 PROCEDURE — 93005 ELECTROCARDIOGRAM TRACING: CPT

## 2024-07-25 PROCEDURE — 83690 ASSAY OF LIPASE: CPT

## 2024-07-25 PROCEDURE — 84484 ASSAY OF TROPONIN QUANT: CPT

## 2024-07-25 PROCEDURE — 93005 ELECTROCARDIOGRAM TRACING: CPT | Performed by: EMERGENCY MEDICINE

## 2024-07-25 PROCEDURE — 71045 X-RAY EXAM CHEST 1 VIEW: CPT

## 2024-07-25 PROCEDURE — 36415 COLL VENOUS BLD VENIPUNCTURE: CPT

## 2024-07-25 PROCEDURE — 96365 THER/PROPH/DIAG IV INF INIT: CPT

## 2024-07-25 PROCEDURE — 83735 ASSAY OF MAGNESIUM: CPT

## 2024-07-25 PROCEDURE — 80053 COMPREHEN METABOLIC PANEL: CPT

## 2024-07-25 RX ORDER — ASPIRIN 81 MG/1
81 TABLET ORAL DAILY
Status: DISCONTINUED | OUTPATIENT
Start: 2024-07-26 | End: 2024-07-27 | Stop reason: HOSPADM

## 2024-07-25 RX ORDER — ALLOPURINOL 300 MG/1
300 TABLET ORAL DAILY
Status: DISCONTINUED | OUTPATIENT
Start: 2024-07-26 | End: 2024-07-25

## 2024-07-25 RX ORDER — ROSUVASTATIN CALCIUM 10 MG/1
10 TABLET, COATED ORAL NIGHTLY
COMMUNITY
Start: 2024-05-17

## 2024-07-25 RX ORDER — DILTIAZEM HCL IN NACL,ISO-OSM 125 MG/125
5-15 PLASTIC BAG, INJECTION (ML) INTRAVENOUS
Status: DISCONTINUED | OUTPATIENT
Start: 2024-07-25 | End: 2024-07-27 | Stop reason: HOSPADM

## 2024-07-25 RX ORDER — SODIUM CHLORIDE 9 MG/ML
40 INJECTION, SOLUTION INTRAVENOUS AS NEEDED
Status: DISCONTINUED | OUTPATIENT
Start: 2024-07-25 | End: 2024-07-27 | Stop reason: HOSPADM

## 2024-07-25 RX ORDER — METOPROLOL SUCCINATE 50 MG/1
50 TABLET, EXTENDED RELEASE ORAL DAILY
Status: DISCONTINUED | OUTPATIENT
Start: 2024-07-26 | End: 2024-07-25

## 2024-07-25 RX ORDER — SODIUM CHLORIDE 0.9 % (FLUSH) 0.9 %
10 SYRINGE (ML) INJECTION AS NEEDED
Status: DISCONTINUED | OUTPATIENT
Start: 2024-07-25 | End: 2024-07-27 | Stop reason: HOSPADM

## 2024-07-25 RX ORDER — LOSARTAN POTASSIUM 50 MG/1
100 TABLET ORAL
Status: DISCONTINUED | OUTPATIENT
Start: 2024-07-26 | End: 2024-07-27 | Stop reason: HOSPADM

## 2024-07-25 RX ORDER — SODIUM CHLORIDE 0.9 % (FLUSH) 0.9 %
10 SYRINGE (ML) INJECTION EVERY 12 HOURS SCHEDULED
Status: DISCONTINUED | OUTPATIENT
Start: 2024-07-25 | End: 2024-07-27 | Stop reason: HOSPADM

## 2024-07-25 RX ORDER — AMOXICILLIN 250 MG
2 CAPSULE ORAL 2 TIMES DAILY PRN
Status: DISCONTINUED | OUTPATIENT
Start: 2024-07-25 | End: 2024-07-27 | Stop reason: HOSPADM

## 2024-07-25 RX ORDER — ALUMINA, MAGNESIA, AND SIMETHICONE 2400; 2400; 240 MG/30ML; MG/30ML; MG/30ML
15 SUSPENSION ORAL EVERY 6 HOURS PRN
Status: DISCONTINUED | OUTPATIENT
Start: 2024-07-25 | End: 2024-07-27 | Stop reason: HOSPADM

## 2024-07-25 RX ORDER — POLYETHYLENE GLYCOL 3350 17 G/17G
17 POWDER, FOR SOLUTION ORAL DAILY PRN
Status: DISCONTINUED | OUTPATIENT
Start: 2024-07-25 | End: 2024-07-27 | Stop reason: HOSPADM

## 2024-07-25 RX ORDER — PANTOPRAZOLE SODIUM 40 MG/1
40 TABLET, DELAYED RELEASE ORAL DAILY
Status: DISCONTINUED | OUTPATIENT
Start: 2024-07-26 | End: 2024-07-27 | Stop reason: HOSPADM

## 2024-07-25 RX ORDER — ACETAMINOPHEN 325 MG/1
650 TABLET ORAL EVERY 6 HOURS PRN
Status: DISCONTINUED | OUTPATIENT
Start: 2024-07-25 | End: 2024-07-27 | Stop reason: HOSPADM

## 2024-07-25 RX ORDER — ROSUVASTATIN CALCIUM 5 MG/1
10 TABLET, COATED ORAL NIGHTLY
Status: DISCONTINUED | OUTPATIENT
Start: 2024-07-25 | End: 2024-07-27 | Stop reason: HOSPADM

## 2024-07-25 RX ORDER — ALLOPURINOL 300 MG/1
300 TABLET ORAL NIGHTLY
Status: DISCONTINUED | OUTPATIENT
Start: 2024-07-25 | End: 2024-07-27 | Stop reason: HOSPADM

## 2024-07-25 RX ORDER — BISACODYL 5 MG/1
5 TABLET, DELAYED RELEASE ORAL DAILY PRN
Status: DISCONTINUED | OUTPATIENT
Start: 2024-07-25 | End: 2024-07-27 | Stop reason: HOSPADM

## 2024-07-25 RX ORDER — BISACODYL 10 MG
10 SUPPOSITORY, RECTAL RECTAL DAILY PRN
Status: DISCONTINUED | OUTPATIENT
Start: 2024-07-25 | End: 2024-07-27 | Stop reason: HOSPADM

## 2024-07-25 RX ORDER — NITROGLYCERIN 0.4 MG/1
0.4 TABLET SUBLINGUAL
Status: DISCONTINUED | OUTPATIENT
Start: 2024-07-25 | End: 2024-07-27 | Stop reason: HOSPADM

## 2024-07-25 RX ORDER — ASPIRIN 81 MG/1
324 TABLET, CHEWABLE ORAL ONCE
Status: COMPLETED | OUTPATIENT
Start: 2024-07-25 | End: 2024-07-25

## 2024-07-25 RX ORDER — HYDROCHLOROTHIAZIDE 25 MG/1
25 TABLET ORAL
Status: DISCONTINUED | OUTPATIENT
Start: 2024-07-26 | End: 2024-07-27 | Stop reason: HOSPADM

## 2024-07-25 RX ORDER — METOPROLOL SUCCINATE 50 MG/1
50 TABLET, EXTENDED RELEASE ORAL DAILY
Status: DISCONTINUED | OUTPATIENT
Start: 2024-07-25 | End: 2024-07-27 | Stop reason: HOSPADM

## 2024-07-25 RX ORDER — DILTIAZEM HYDROCHLORIDE 5 MG/ML
20 INJECTION INTRAVENOUS ONCE
Status: COMPLETED | OUTPATIENT
Start: 2024-07-25 | End: 2024-07-25

## 2024-07-25 RX ORDER — AMLODIPINE BESYLATE 10 MG/1
5 TABLET ORAL 2 TIMES DAILY
Status: DISCONTINUED | OUTPATIENT
Start: 2024-07-25 | End: 2024-07-27 | Stop reason: HOSPADM

## 2024-07-25 RX ADMIN — DILTIAZEM HYDROCHLORIDE 20 MG: 5 INJECTION, SOLUTION INTRAVENOUS at 16:55

## 2024-07-25 RX ADMIN — METOPROLOL SUCCINATE 50 MG: 50 TABLET, EXTENDED RELEASE ORAL at 22:40

## 2024-07-25 RX ADMIN — ASPIRIN 243 MG: 81 TABLET, CHEWABLE ORAL at 16:52

## 2024-07-25 RX ADMIN — ROSUVASTATIN CALCIUM 10 MG: 5 TABLET, FILM COATED ORAL at 21:46

## 2024-07-25 RX ADMIN — RIVAROXABAN 20 MG: 20 TABLET, FILM COATED ORAL at 21:46

## 2024-07-25 RX ADMIN — DILTIAZEM HYDROCHLORIDE 5 MG/HR: 5 INJECTION, SOLUTION INTRAVENOUS at 18:14

## 2024-07-25 RX ADMIN — ALLOPURINOL 300 MG: 300 TABLET ORAL at 21:45

## 2024-07-25 RX ADMIN — DRONEDARONE 400 MG: 400 TABLET, FILM COATED ORAL at 21:45

## 2024-07-25 NOTE — H&P
[facility]   HOSPITALIST HISTORY AND PHYSICAL  Date: 2024   Patient Name: Malik Abbasi  : 1965  MRN: [id number]  Primary Care Physician:  Valeriano Abdalla PA  Date of admission: 2024    Subjective   Subjective     Chief Complaint: Palpitations    HPI:    Malik Abbasi is a 59 y.o. male past medical history of rapid atrial fibrillation status post DCCV on Multaq/Xarelto, GERD, hypertension, hyperlipidemia, MANDA on nightly CPAP, morbid obesity who presents to the ER due to palpitations morning.  Patient states he woke up and felt his heart was beating really fast and irregularly.  He called his cardiologist office and was able to get a walk-in appointment.  Upon arrival there he had an EKG done showed that he was in to rapid rate and was advised to come to the ER for definitive management.    Upon arrival here patient was found to be tachycardic up to the 140s occasionally.  EKG showed rapid atrial flutter with variable block and lab workup including troponin was unremarkable.  Chest x-ray was done and did not show any pneumonia or acute pulmonary finding.  Patient's cardiology was contacted by the ER and he advised initiation of Cardizem and a Cardizem drip.  This was initiated and then the hospitalist service contacted for admission.  At the time exam patient is resting comfortably and is asymptomatic.  Denies having any chest pain currently.  Denies any recent illness.  No recent change in medications and has been adherent to all his medications.  He is on an exercise plan and has been actively losing weight on purpose.  Denies any diarrhea or vomiting.      Personal History     Past Medical History:  Past Medical History:   Diagnosis Date    A-fib     Ankle sprain     Arthritis     CHF (congestive heart failure)     CTS (carpal tunnel syndrome)     GERD (gastroesophageal reflux disease)     Hyperlipidemia     Hypertension     Irregular heartbeat     Knee swelling      Rotator cuff syndrome     Sleep apnea     cpap    Tear of meniscus of knee          Past Surgical History:  Past Surgical History:   Procedure Laterality Date    CARDIOVERSION  2013    CHOLECYSTECTOMY  2006    ENDOSCOPY N/A 11/21/2022    Procedure: ESOPHAGOGASTRODUODENOSCOPY WITH BIOPSIES;  Surgeon: Reese Mcintyre MD;  Location: [facility] ENDOSCOPY;  Service: General;  Laterality: N/A;  NORMAL COLON    HAND SURGERY  2022    Did not work    KNEE ARTHROSCOPY Left     ROTATOR CUFF REPAIR Right 2019    SHOULDER SURGERY      TRICEP TENDON REPAIR Right 2019         Family History:   Reviewed and noncontributory except as mentioned in HPI    Social History:   Social Determinants of Health     Tobacco Use: Medium Risk (7/25/2024)    Patient History     Smoking Tobacco Use: Former     Smokeless Tobacco Use: Never     Passive Exposure: Not on file   Alcohol Use: Not on file   Financial Resource Strain: Not on file   Food Insecurity: Not on file   Transportation Needs: Not on file   Physical Activity: Not on file   Stress: Not on file   Social Connections: Unknown (10/11/2023)    Family and Community Support     Help with Day-to-Day Activities: Not on file     Lonely or Isolated: Not on file   Interpersonal Safety: Not At Risk (7/25/2024)    Abuse Screen     Unsafe at Home or Work/School: no     Feels Threatened by Someone?: no     Does Anyone Keep You from Contacting Others or Doint Things Outside the Home?: no     Physical Sign of Abuse Present: no   Depression: Not on file   Housing Stability: Unknown (11/28/2023)    Housing Stability     Current Living Arrangements: Not on file     Potentially Unsafe Housing Conditions: Not on file   Utilities: Not on file   Health Literacy: Unknown (10/11/2023)    Education     Help with school or training?: Not on file     Preferred Language: Not on file   Employment: Unknown (10/11/2023)    Employment     Do you want help finding or keeping work or a job?: Not on file   Disabilities:  Unknown (11/28/2023)    Disabilities     Concentrating, Remembering, or Making Decisions Difficulty: Not on file     Doing Errands Independently Difficulty: Not on file         Home Medications:  EPINEPHrine, allopurinol, amLODIPine, aspirin, dronedarone, ergocalciferol, fenofibric acid, indomethacin, metoprolol succinate XL, olmesartan-hydrochlorothiazide, pantoprazole, and rivaroxaban    Allergies:  Allergies   Allergen Reactions    Codeine Other (See Comments)     Syncope       Review of Systems   All systems were reviewed and negative except for: Palpitations    Objective   Objective     Vitals:   Temp:  [98.2 °F (36.8 °C)] 98.2 °F (36.8 °C)  Heart Rate:  [105-125] 123  Resp:  [16-18] 18  BP: ()/(73-99) 120/95    Physical Exam    Constitutional: Awake, alert, no acute distress   Eyes: Pupils equal, sclerae anicteric, no conjunctival injection   HENT: NCAT, mucous membranes moist   Neck: Supple, no thyromegaly, no lymphadenopathy, trachea midline   Respiratory: Clear to auscultation bilaterally, nonlabored respirations    Cardiovascular: Irregularly irregular rate and rhythm, rapid,, no murmurs, rubs, or gallops, palpable pedal pulses bilaterally   Gastrointestinal: Positive bowel sounds, soft, nontender, nondistended   Musculoskeletal: No bilateral ankle edema, no clubbing or cyanosis to extremities   Psychiatric: Appropriate affect, cooperative   Neurologic: Oriented x 3, strength symmetric in all extremities, Cranial Nerves grossly intact to confrontation, speech clear   Skin: No rashes     Result Review    Result Review:  I have personally reviewed the results from the time of this admission to 7/25/2024 18:42 EDT and agree with these findings:  [x]  Laboratory  [x]  Microbiology  [x]  Radiology  [x]  EKG/Telemetry   [x]  Cardiology/Vascular   []  Pathology  [x]  Old records  []  Other:      Assessment & Plan   Assessment / Plan     Assessment/Plan:   Rapid atrial flutter with variable  block  Hypertension  Hyperlipidemia  GERD  MANDA on nightly CPAP  Gout    Plan  - Admit to hospitalist service  - Continue Cardizem drip, cardiology consultation.  N.p.o. at midnight in case cardioversion is planned in the a.m.  Continue telemetry monitoring with goal heart rate less than 110.  We will continue his Xarelto for stroke prophylaxis.  We will check a TSH to rule out any hyperthyroidism leading to his symptoms.  Check echo to rule out structural heart disease.  Repeat additional troponin to rule out any ischemia although this seems unlikely given he lacks chest pain  - Continue home antihypertensives once we have confirmed the adequate dosing.  Patient is requesting to take all his home medications himself, I advised him that I am okay with this as long as he informed the nurses with each pill that he takes exactly what he is taking and it is profiled into our electronic record  - Continue PPI  - Continue nightly CPAP, patient to bring his own from home  - Continue allopurinol      Discussed with ER Physician and Nurse    All labs/imaging studies were personally reviewed and findings are as noted above      DVT Prophylaxis: Xarelto    CODE STATUS:    Code Status (Patient has no pulse and is not breathing): CPR (Attempt to Resuscitate)  Medical Interventions (Patient has pulse or is breathing): Full Support      Admission Status:  I believe this patient meets inpatient status.    Electronically signed by Jairo Ziegler MD, 07/25/24, 6:42 PM EDT.

## 2024-07-25 NOTE — Clinical Note
Level of Care: Telemetry [5]   Diagnosis: Rapid atrial fibrillation [205233]   Admitting Physician: IFEOMA WALKER [[id number]]   Attending Physician: IFEOMA WALKER [[id number]]   Certification: I Certify That Inpatient Hospital Services Are Medically Necessary For Greater Than 2 Midnights

## 2024-07-25 NOTE — ED PROVIDER NOTES
Time: 6:00 PM EDT  Date of encounter:  7/25/2024  Independent Historian/Clinical History and Information was obtained by:   Patient    History is limited by: N/A    Chief Complaint: Tachycardia      History of Present Illness:  Patient is a 59 y.o. year old male who presents to the emergency department for evaluation of tachycardia and palpitations noted at his cardiologist office.  Patient denies chest pain and shortness of breath.  Patient has no cough hemoptysis.  Patient denies nausea, vomiting, and diarrhea.    HPI    Patient Care Team  Primary Care Provider: Valeriano Abdalla PA    Past Medical History:     Allergies   Allergen Reactions    Codeine Other (See Comments)     Syncope     Past Medical History:   Diagnosis Date    A-fib     Ankle sprain     Arthritis     CHF (congestive heart failure)     CTS (carpal tunnel syndrome)     GERD (gastroesophageal reflux disease)     Hyperlipidemia     Hypertension     Irregular heartbeat     Knee swelling     Rotator cuff syndrome     Sleep apnea     cpap    Tear of meniscus of knee      Past Surgical History:   Procedure Laterality Date    CARDIOVERSION  2013    CHOLECYSTECTOMY  2006    ENDOSCOPY N/A 11/21/2022    Procedure: ESOPHAGOGASTRODUODENOSCOPY WITH BIOPSIES;  Surgeon: Reese Mcintyre MD;  Location: [facility] ENDOSCOPY;  Service: General;  Laterality: N/A;  NORMAL COLON    HAND SURGERY  2022    Did not work    KNEE ARTHROSCOPY Left     ROTATOR CUFF REPAIR Right 2019    SHOULDER SURGERY      TRICEP TENDON REPAIR Right 2019     Family History   Problem Relation Age of Onset    Heart disease Maternal Grandfather     Malig Hyperthermia Neg Hx        Home Medications:  Prior to Admission medications    Medication Sig Start Date End Date Taking? Authorizing Provider   allopurinol (ZYLOPRIM) 300 MG tablet Take 1 tablet by mouth Daily.    ProviderRona MD   amLODIPine (NORVASC) 5 MG tablet Take 1 tablet by mouth.    ProviderRona MD   aspirin 81  MG EC tablet aspirin 81 mg tablet,delayed release   Take 1 tablet every day by oral route.    Rona Harrison MD   aspirin 81 MG EC tablet Daily.    Rona Harrison MD   BENELIAN HCT 40-25 MG per tablet 1 tablet Daily. 16   Rona Harrison MD   EPINEPHrine (EPIPEN) 0.3 MG/0.3ML solution auto-injector injection  10/27/22   Rona Harrison MD   ergocalciferol (ERGOCALCIFEROL) 1.25 MG (30201 UT) capsule Take 1 capsule by mouth 1 (One) Time Per Week. 3/10/22   Rona Harrison MD   fenofibric acid (TRILIPIX) 135 MG capsule delayed-release delayed release capsule 1 capsule Daily. 16   Rona Harrison MD   indomethacin (INDOCIN) 50 MG capsule Take 1 capsule by mouth As Needed. 3/8/22   Rona Harrison MD   metoprolol succinate XL (TOPROL-XL) 50 MG 24 hr tablet Take 1 tablet by mouth Daily. 16   Rona Harrison MD   MULTAQ 400 MG tablet 1 tablet 2 (Two) Times a Day With Meals. 16   Rona Harrison MD   pantoprazole (PROTONIX) 40 MG EC tablet Take 1 tablet by mouth Daily. 22   Rona Harrison MD   XARELTO 20 MG tablet Take 1 tablet by mouth Daily With Dinner. 16   Rona Harrison MD        Social History:   Social History     Tobacco Use    Smoking status: Former     Current packs/day: 0.00     Types: Cigarettes     Quit date: 2006     Years since quittin.8    Smokeless tobacco: Never   Vaping Use    Vaping status: Never Used   Substance Use Topics    Alcohol use: Not Currently     Comment: rarely    Drug use: No         Review of Systems:  Review of Systems   Constitutional:  Negative for chills and fever.   HENT:  Negative for congestion, rhinorrhea and sore throat.    Eyes:  Negative for pain and visual disturbance.   Respiratory:  Negative for apnea, cough, chest tightness and shortness of breath.    Cardiovascular:  Positive for palpitations. Negative for chest pain.   Gastrointestinal:  Negative for abdominal pain,  "diarrhea, nausea and vomiting.   Genitourinary:  Negative for difficulty urinating and dysuria.   Musculoskeletal:  Negative for joint swelling and myalgias.   Skin:  Negative for color change.   Neurological:  Negative for seizures and headaches.   Psychiatric/Behavioral: Negative.     All other systems reviewed and are negative.       Physical Exam:  /97   Pulse (!) 126   Temp 98.2 °F (36.8 °C) (Oral)   Resp 18   Ht 182.9 cm (72\")   Wt (!) 150 kg (330 lb 11 oz)   SpO2 95%   BMI 44.85 kg/m²     Physical Exam  Vitals and nursing note reviewed.   Constitutional:       General: He is not in acute distress.     Appearance: Normal appearance. He is not toxic-appearing.   HENT:      Head: Normocephalic and atraumatic.      Jaw: There is normal jaw occlusion.   Eyes:      General: Lids are normal.      Extraocular Movements: Extraocular movements intact.      Conjunctiva/sclera: Conjunctivae normal.      Pupils: Pupils are equal, round, and reactive to light.   Cardiovascular:      Rate and Rhythm: Regular rhythm. Tachycardia present.      Pulses: Normal pulses.      Heart sounds: Normal heart sounds.   Pulmonary:      Effort: Pulmonary effort is normal. No respiratory distress.      Breath sounds: Normal breath sounds. No wheezing or rhonchi.   Abdominal:      General: Abdomen is flat.      Palpations: Abdomen is soft.      Tenderness: There is no abdominal tenderness. There is no guarding or rebound.   Musculoskeletal:         General: Normal range of motion.      Cervical back: Normal range of motion and neck supple.      Right lower leg: No edema.      Left lower leg: No edema.   Skin:     General: Skin is warm and dry.   Neurological:      Mental Status: He is alert and oriented to person, place, and time. Mental status is at baseline.   Psychiatric:         Mood and Affect: Mood normal.                  Procedures:  Procedures      Medical Decision Making:      Comorbidities that affect care:    Atrial " Fibrillation    External Notes reviewed:    Previous Clinic Note: Patient was seen in clinic for palpitations      The following orders were placed and all results were independently analyzed by me:  Orders Placed This Encounter   Procedures    XR Chest 1 View    [place] Draw    High Sensitivity Troponin T    Comprehensive Metabolic Panel    Lipase    BNP    Magnesium    CBC Auto Differential    High Sensitivity Troponin T 2Hr    NPO Diet NPO Type: Strict NPO    Undress & Gown    Continuous Pulse Oximetry    Code Status and Medical Interventions: CPR (Attempt to Resuscitate); Full Support    Inpatient Hospitalist Consult    Oxygen Therapy- Nasal Cannula; Titrate 1-6 LPM Per SpO2; 90 - 95%    ECG 12 Lead ED Triage Standing Order; Chest Pain    ECG 12 Lead ED Triage Standing Order; Chest Pain    Insert Peripheral IV    Inpatient Admission    Initiate Observation Status    CBC & Differential    Green Top (Gel)    Lavender Top    Gold Top - SST    Light Blue Top       Medications Given in the Emergency Department:  Medications   sodium chloride 0.9 % flush 10 mL (has no administration in time range)   dilTIAZem (CARDIZEM) 125 mg in 125 mL sodium chloride  infusion (10 mg/hr Intravenous Rate/Dose Change 7/25/24 1931)   aspirin chewable tablet 324 mg (243 mg Oral Given 7/25/24 1652)   dilTIAZem (CARDIZEM) injection 20 mg (20 mg Intravenous Given 7/25/24 1655)        ED Course:         Labs:    Lab Results (last 24 hours)       Procedure Component Value Units Date/Time    High Sensitivity Troponin T [[id number]]  (Normal) Collected: 07/25/24 1528    Specimen: Blood from Hand, Right Updated: 07/25/24 1620     HS Troponin T 10 ng/L     Narrative:      High Sensitive Troponin T Reference Range:  <14.0 ng/L- Negative Female for AMI  <22.0 ng/L- Negative Male for AMI  >=14 - Abnormal Female indicating possible myocardial injury.  >=22 - Abnormal Male indicating possible myocardial injury.   Clinicians would have to utilize  clinical acumen, EKG, Troponin, and serial changes to determine if it is an Acute Myocardial Infarction or myocardial injury due to an underlying chronic condition.         CBC & Differential [[id number]]  (Normal) Collected: 07/25/24 1528    Specimen: Blood from Hand, Right Updated: 07/25/24 1558    Narrative:      The following orders were created for panel order CBC & Differential.  Procedure                               Abnormality         Status                     ---------                               -----------         ------                     CBC Auto Differential[[id number]]        Normal              Final result                 Please view results for these tests on the individual orders.    Comprehensive Metabolic Panel [[id number]]  (Abnormal) Collected: 07/25/24 1528    Specimen: Blood from Hand, Right Updated: 07/25/24 1620     Glucose 104 mg/dL      BUN 21 mg/dL      Creatinine 1.09 mg/dL      Sodium 139 mmol/L      Potassium 3.9 mmol/L      Chloride 101 mmol/L      CO2 23.0 mmol/L      Calcium 9.4 mg/dL      Total Protein 7.6 g/dL      Albumin 4.3 g/dL      ALT (SGPT) 27 U/L      AST (SGOT) 26 U/L      Alkaline Phosphatase 84 U/L      Total Bilirubin 0.8 mg/dL      Globulin 3.3 gm/dL      A/G Ratio 1.3 g/dL      BUN/Creatinine Ratio 19.3     Anion Gap 15.0 mmol/L      eGFR 77.2 mL/min/1.73     Narrative:      GFR Normal >60  Chronic Kidney Disease <60  Kidney Failure <15      Lipase [[id number]]  (Normal) Collected: 07/25/24 1528    Specimen: Blood from Hand, Right Updated: 07/25/24 1620     Lipase 26 U/L     BNP [[id number]]  (Normal) Collected: 07/25/24 1528    Specimen: Blood from Hand, Right Updated: 07/25/24 1618     proBNP 298.1 pg/mL     Narrative:      This assay is used as an aid in the diagnosis of individuals suspected of having heart failure. It can be used as an aid in the diagnosis of acute decompensated heart failure (ADHF) in patients presenting with signs and symptoms of ADHF to  the emergency department (ED). In addition, NT-proBNP of <300 pg/mL indicates ADHF is not likely.    Age Range Result Interpretation  NT-proBNP Concentration (pg/mL:      <50             Positive            >450                   Gray                 300-450                    Negative             <300    50-75           Positive            >900                  Gray                300-900                  Negative            <300      >75             Positive            >1800                  Gray                300-1800                  Negative            <300    Magnesium [[id number]]  (Normal) Collected: 07/25/24 1528    Specimen: Blood from Hand, Right Updated: 07/25/24 1620     Magnesium 2.0 mg/dL     CBC Auto Differential [[id number]]  (Normal) Collected: 07/25/24 1528    Specimen: Blood from Hand, Right Updated: 07/25/24 1558     WBC 8.18 10*3/mm3      RBC 5.69 10*6/mm3      Hemoglobin 17.3 g/dL      Hematocrit 49.2 %      MCV 86.5 fL      MCH 30.4 pg      MCHC 35.2 g/dL      RDW 13.2 %      RDW-SD 40.7 fl      MPV 10.2 fL      Platelets 262 10*3/mm3      Neutrophil % 66.9 %      Lymphocyte % 24.2 %      Monocyte % 6.0 %      Eosinophil % 1.7 %      Basophil % 1.0 %      Immature Grans % 0.2 %      Neutrophils, Absolute 5.47 10*3/mm3      Lymphocytes, Absolute 1.98 10*3/mm3      Monocytes, Absolute 0.49 10*3/mm3      Eosinophils, Absolute 0.14 10*3/mm3      Basophils, Absolute 0.08 10*3/mm3      Immature Grans, Absolute 0.02 10*3/mm3      nRBC 0.0 /100 WBC     High Sensitivity Troponin T 2Hr [[id number]]  (Normal) Collected: 07/25/24 1853    Specimen: Blood from Hand, Left Updated: 07/25/24 1929     HS Troponin T 9 ng/L      Troponin T Delta -1 ng/L     Narrative:      High Sensitive Troponin T Reference Range:  <14.0 ng/L- Negative Female for AMI  <22.0 ng/L- Negative Male for AMI  >=14 - Abnormal Female indicating possible myocardial injury.  >=22 - Abnormal Male indicating possible myocardial injury.    Clinicians would have to utilize clinical acumen, EKG, Troponin, and serial changes to determine if it is an Acute Myocardial Infarction or myocardial injury due to an underlying chronic condition.                  Imaging:    XR Chest 1 View    Result Date: 7/25/2024  XR CHEST 1 VW Date of Exam: 7/25/2024 3:40 PM EDT Indication: Chest Pain Triage Protocol Comparison: 5/24/2019 Findings: Study is limited by overlying support and monitoring apparatus. The heart and mediastinal contours are within normal limits. Lung volumes are low. No focal consolidation noted. No acute osseous abnormality     Impression: Limited negative low lung volume portable chest Electronically Signed: Dario Gillespie MD  7/25/2024 4:06 PM EDT  Workstation ID: [id number]       Differential Diagnosis and Discussion:    Palpitations: Differential diagnosis includes but is not limited to anxiety, atrioventricular blocks, mitral valve disease, hypoxia, coronary artery disease, hypokalemia, anemia, fever, COPD, congestive heart failure, pericarditis, Carli-Parkinson-White syndrome, pulmonary embolism, SVT, atrial fibrillation, atrial flutter, sinus tachycardia, thyrotoxicosis, and pheochromocytoma.    All labs were reviewed and interpreted by me.  All X-rays impressions were independently interpreted by me.  EKG was interpreted by me.    MDM     The patient´s CBC that was reviewed and interpreted by me shows no abnormalities of critical concern. Of note, there is no anemia requiring a blood transfusion and the platelet count is acceptable.  The patient´s CMP that was reviewed and interpretted by me shows no abnormalities of critical concern. Of note, the patient´s sodium and potassium are acceptable. The patient´s liver enzymes are unremarkable. The patient´s renal function (creatinine) is preserved. The patient has a normal anion gap.    Patient was placed on the cardiac monitor after given Cardizem.  They were monitored for ventricular ectopy,  arrhythmia, tachycardia, hypoxia, and changes in blood pressure.  Patient was rechecked several times throughout their stay.  Critical Care Note: Total Critical Care time of 50 minutes. Total critical care time documented does not include time spent on separately billed procedures for services of nurses or physician assistants. I personally saw and examined the patient. I have reviewed all diagnostic interpretations and treatment plans as written. I was present for the key portions of any procedures performed and the inclusive time noted in any critical care statement. Critical care time includes patient management by me, time spent at the patients bedside,  time to review lab and imaging results, discussing patient care, documentation in the medical record, and time spent with family or caregiver.        Patient Care Considerations:    PERC: I used the PERC score to risk stratify the patient for PE and a CT of the chest was considered but ultimately not indicated in today's visit.      Consultants/Shared Management Plan:    None    Social Determinants of Health:    Patient is independent, reliable, and has access to care.       Disposition and Care Coordination:    Admit:   Through independent evaluation of the patient's history, physical, and imperical data, the patient meets criteria for inpatient admission to the hospital.        Final diagnoses:   Rapid atrial fibrillation        ED Disposition       ED Disposition   Decision to Admit    Condition   --    Comment   Level of Care: Telemetry [5]   Diagnosis: Rapid atrial fibrillation [[id number]]   Admitting Physician: IFEOMA WALKER [[id number]]   Attending Physician: IFEOMA WALKER [[id number]]                 This medical record created using voice recognition software.             Giovanny Menard MD  07/25/24 2008

## 2024-07-26 LAB
ANION GAP SERPL CALCULATED.3IONS-SCNC: 13.1 MMOL/L (ref 5–15)
BASOPHILS # BLD AUTO: 0.07 10*3/MM3 (ref 0–0.2)
BASOPHILS NFR BLD AUTO: 0.8 % (ref 0–1.5)
BH CV ECHO MEAS - AO ROOT DIAM: 3.2 CM
BH CV ECHO MEAS - EDV(CUBED): 96.2 ML
BH CV ECHO MEAS - EDV(MOD-SP2): 102 ML
BH CV ECHO MEAS - EDV(MOD-SP4): 110 ML
BH CV ECHO MEAS - EF(MOD-BP): 53.9 %
BH CV ECHO MEAS - EF(MOD-SP2): 53.6 %
BH CV ECHO MEAS - EF(MOD-SP4): 57.2 %
BH CV ECHO MEAS - ESV(CUBED): 39.8 ML
BH CV ECHO MEAS - ESV(MOD-SP2): 47.3 ML
BH CV ECHO MEAS - ESV(MOD-SP4): 47.1 ML
BH CV ECHO MEAS - FS: 25.5 %
BH CV ECHO MEAS - IVS/LVPW: 0.89 CM
BH CV ECHO MEAS - IVSD: 1.26 CM
BH CV ECHO MEAS - LA DIMENSION: 3.5 CM
BH CV ECHO MEAS - LAT PEAK E' VEL: 12.9 CM/SEC
BH CV ECHO MEAS - LV MASS(C)D: 238.7 GRAMS
BH CV ECHO MEAS - LVIDD: 4.6 CM
BH CV ECHO MEAS - LVIDS: 3.4 CM
BH CV ECHO MEAS - LVOT AREA: 3.2 CM2
BH CV ECHO MEAS - LVOT DIAM: 2.01 CM
BH CV ECHO MEAS - LVPWD: 1.41 CM
BH CV ECHO MEAS - MED PEAK E' VEL: 10.8 CM/SEC
BH CV ECHO MEAS - MV A MAX VEL: 49 CM/SEC
BH CV ECHO MEAS - MV DEC SLOPE: 853.4 CM/SEC2
BH CV ECHO MEAS - MV DEC TIME: 0.12 SEC
BH CV ECHO MEAS - MV E MAX VEL: 99.2 CM/SEC
BH CV ECHO MEAS - RVDD: 2.9 CM
BH CV ECHO MEAS - SV(MOD-SP2): 54.7 ML
BH CV ECHO MEAS - SV(MOD-SP4): 62.9 ML
BH CV ECHO MEASUREMENTS AVERAGE E/E' RATIO: 8.37
BUN SERPL-MCNC: 21 MG/DL (ref 8–23)
BUN/CREAT SERPL: 20.8 (ref 7–25)
CALCIUM SPEC-SCNC: 9 MG/DL (ref 8.6–10.5)
CHLORIDE SERPL-SCNC: 105 MMOL/L (ref 98–107)
CO2 SERPL-SCNC: 22.9 MMOL/L (ref 22–29)
CREAT SERPL-MCNC: 1.01 MG/DL (ref 0.76–1.27)
DEPRECATED RDW RBC AUTO: 43 FL (ref 37–54)
EGFRCR SERPLBLD CKD-EPI 2021: 84.6 ML/MIN/1.73
EOSINOPHIL # BLD AUTO: 0.22 10*3/MM3 (ref 0–0.4)
EOSINOPHIL NFR BLD AUTO: 2.4 % (ref 0.3–6.2)
ERYTHROCYTE [DISTWIDTH] IN BLOOD BY AUTOMATED COUNT: 13.3 % (ref 12.3–15.4)
GLUCOSE SERPL-MCNC: 100 MG/DL (ref 65–99)
HCT VFR BLD AUTO: 48 % (ref 37.5–51)
HGB BLD-MCNC: 16.7 G/DL (ref 13–17.7)
IMM GRANULOCYTES # BLD AUTO: 0.03 10*3/MM3 (ref 0–0.05)
IMM GRANULOCYTES NFR BLD AUTO: 0.3 % (ref 0–0.5)
IVRT: 85 MS
LYMPHOCYTES # BLD AUTO: 2.09 10*3/MM3 (ref 0.7–3.1)
LYMPHOCYTES NFR BLD AUTO: 22.9 % (ref 19.6–45.3)
MAGNESIUM SERPL-MCNC: 2 MG/DL (ref 1.6–2.4)
MCH RBC QN AUTO: 31 PG (ref 26.6–33)
MCHC RBC AUTO-ENTMCNC: 34.8 G/DL (ref 31.5–35.7)
MCV RBC AUTO: 89.1 FL (ref 79–97)
MONOCYTES # BLD AUTO: 0.69 10*3/MM3 (ref 0.1–0.9)
MONOCYTES NFR BLD AUTO: 7.6 % (ref 5–12)
NEUTROPHILS NFR BLD AUTO: 6.03 10*3/MM3 (ref 1.7–7)
NEUTROPHILS NFR BLD AUTO: 66 % (ref 42.7–76)
NRBC BLD AUTO-RTO: 0 /100 WBC (ref 0–0.2)
PLATELET # BLD AUTO: 236 10*3/MM3 (ref 140–450)
PMV BLD AUTO: 10.7 FL (ref 6–12)
POTASSIUM SERPL-SCNC: 3.5 MMOL/L (ref 3.5–5.2)
QT INTERVAL: 469 MS
QTC INTERVAL: 559 MS
RBC # BLD AUTO: 5.39 10*6/MM3 (ref 4.14–5.8)
SODIUM SERPL-SCNC: 141 MMOL/L (ref 136–145)
TROPONIN T SERPL HS-MCNC: 21 NG/L
WBC NRBC COR # BLD AUTO: 9.13 10*3/MM3 (ref 3.4–10.8)

## 2024-07-26 PROCEDURE — 84484 ASSAY OF TROPONIN QUANT: CPT | Performed by: STUDENT IN AN ORGANIZED HEALTH CARE EDUCATION/TRAINING PROGRAM

## 2024-07-26 PROCEDURE — 25010000002 MIDAZOLAM PER 1MG: Performed by: SPECIALIST

## 2024-07-26 PROCEDURE — 80048 BASIC METABOLIC PNL TOTAL CA: CPT | Performed by: STUDENT IN AN ORGANIZED HEALTH CARE EDUCATION/TRAINING PROGRAM

## 2024-07-26 PROCEDURE — 99232 SBSQ HOSP IP/OBS MODERATE 35: CPT | Performed by: INTERNAL MEDICINE

## 2024-07-26 PROCEDURE — 85025 COMPLETE CBC W/AUTO DIFF WBC: CPT | Performed by: STUDENT IN AN ORGANIZED HEALTH CARE EDUCATION/TRAINING PROGRAM

## 2024-07-26 PROCEDURE — G0378 HOSPITAL OBSERVATION PER HR: HCPCS

## 2024-07-26 PROCEDURE — 94799 UNLISTED PULMONARY SVC/PX: CPT

## 2024-07-26 PROCEDURE — 83735 ASSAY OF MAGNESIUM: CPT | Performed by: STUDENT IN AN ORGANIZED HEALTH CARE EDUCATION/TRAINING PROGRAM

## 2024-07-26 PROCEDURE — 25010000002 MEPERIDINE PER 100 MG: Performed by: SPECIALIST

## 2024-07-26 RX ORDER — MIDAZOLAM HYDROCHLORIDE 2 MG/2ML
10 INJECTION, SOLUTION INTRAMUSCULAR; INTRAVENOUS ONCE
Status: COMPLETED | OUTPATIENT
Start: 2024-07-26 | End: 2024-07-26

## 2024-07-26 RX ORDER — MIDAZOLAM HYDROCHLORIDE 2 MG/2ML
INJECTION, SOLUTION INTRAMUSCULAR; INTRAVENOUS
Status: COMPLETED | OUTPATIENT
Start: 2024-07-26 | End: 2024-07-26

## 2024-07-26 RX ORDER — MEPERIDINE HYDROCHLORIDE 25 MG/ML
100 INJECTION INTRAMUSCULAR; INTRAVENOUS; SUBCUTANEOUS EVERY 6 HOURS PRN
Status: DISCONTINUED | OUTPATIENT
Start: 2024-07-26 | End: 2024-07-27 | Stop reason: HOSPADM

## 2024-07-26 RX ORDER — MEPERIDINE HYDROCHLORIDE 25 MG/ML
INJECTION INTRAMUSCULAR; INTRAVENOUS; SUBCUTANEOUS
Status: COMPLETED | OUTPATIENT
Start: 2024-07-26 | End: 2024-07-26

## 2024-07-26 RX ADMIN — MIDAZOLAM HYDROCHLORIDE 2 MG: 1 INJECTION, SOLUTION INTRAMUSCULAR; INTRAVENOUS at 11:15

## 2024-07-26 RX ADMIN — MIDAZOLAM HYDROCHLORIDE 2 MG: 1 INJECTION, SOLUTION INTRAMUSCULAR; INTRAVENOUS at 10:57

## 2024-07-26 RX ADMIN — ROSUVASTATIN CALCIUM 10 MG: 5 TABLET, FILM COATED ORAL at 21:15

## 2024-07-26 RX ADMIN — MEPERIDINE HYDROCHLORIDE 25 MG: 25 INJECTION INTRAMUSCULAR; INTRAVENOUS; SUBCUTANEOUS at 11:14

## 2024-07-26 RX ADMIN — MEPERIDINE HYDROCHLORIDE 25 MG: 25 INJECTION INTRAMUSCULAR; INTRAVENOUS; SUBCUTANEOUS at 10:59

## 2024-07-26 RX ADMIN — MIDAZOLAM HYDROCHLORIDE 2 MG: 1 INJECTION, SOLUTION INTRAMUSCULAR; INTRAVENOUS at 10:58

## 2024-07-26 RX ADMIN — RIVAROXABAN 20 MG: 20 TABLET, FILM COATED ORAL at 17:42

## 2024-07-26 RX ADMIN — Medication 10 ML: at 21:15

## 2024-07-26 RX ADMIN — Medication 10 ML: at 10:01

## 2024-07-26 RX ADMIN — ALLOPURINOL 300 MG: 300 TABLET ORAL at 21:15

## 2024-07-26 RX ADMIN — MIDAZOLAM HYDROCHLORIDE 2 MG: 1 INJECTION, SOLUTION INTRAMUSCULAR; INTRAVENOUS at 11:03

## 2024-07-26 NOTE — CONSULTS
"Date of service: 7/26/2024    Reason for consultation: Atrial flutter    HPI: A 59-year-old male with a history of atrial flutter, status post remote DC cardioversion.  He has been on Multaq and Xarelto.  He requested to be seen yesterday in the office for evaluation of palpitations.  They were described as\" heart beats fast and hard\".  His symptoms woke him up yesterday morning.  He had no chest pain, dyspnea, orthopnea, syncope or near syncope.  ECG in the office showed atrial flutter with 2:1 AV block.  There were no acute ischemic changes.  He was sent to the ER for further management.    Review of systems: Negative for headache, tinnitus, vertigo, nausea, vomiting, abdominal pain, dysuria, hematuria, hemoptysis, TIA or CVA-like symptoms    Past medical and surgical history:    1.  As stated above  2.  Remote acute diastolic CHF due to cardiac arrhythmia  3.  Hypertension  4.  Hypercholesterolemia  5.  Degenerative joint disease  6.  GERD  7.  Surgeries-procedures: EGD, cholecystectomy, left knee arthroscopy, right triceps tendon repair and right rotator cuff repair.    Medications: See the patient's medication list    Allergies: Codeine    Social history: Non-smoker.  No illicit drug use.  No alcohol for many years.  Used to drink a lot in the past    Family history: Noncontributory.    Physical examination: He was in no pain or respiratory distress  Vital signs: Reviewed  HEENT: Sclerae: Nonicteric.  EOMI  Neck: No JVD or carotid bruit.  No thyromegaly  Chest: CTA  Cardiac: Tachycardic.  RRR.  No murmurs or S3 gallop  Abdomen: Morbidly obese, soft and nontender.  No apparent megalies or masses  Extremities: No edema, cyanosis or clubbing.  Pulses: Intact.  Neuro: Alert, oriented x 3, no facial droop and speech was clear.    Records: Reviewed.    Assessment and plan:    1.  Atrial flutter with RVR.  2.  Remote DC cardioversion for atrial flutter in approximately 2012.  3.  No evidence of acute coronary syndrome " or acute CHF  4.  Hypertension  5.  Echocardiography: Pending  6.  The patient has been on chronically oral anticoagulation (Xarelto).  Proceed with DC cardioversion.  The benefits and the risks of the procedure were explained to the patient who agreed to proceed.

## 2024-07-26 NOTE — PLAN OF CARE
Goal Outcome Evaluation:                   VSS, continues Cardizem drip currently at 5mg/hr, room air, a&ox4, denies pain/discomfort, able to voice needs to staff, no concerns to report, continue POC.

## 2024-07-26 NOTE — PROGRESS NOTES
Patient Care Team:  Valeriano Abdalla PA as PCP - General (Physician Assistant)  Reese Mcintyre MD as Consulting Physician (General Surgery)    Date: 2024  Patient Name: Malik Abbasi  : 1965  MRN: [id number]  Date of admission: 2024  Room/Bed: [facility]/      Subjective   Subjective         Chief Complaint: f/u palpitations     Summary:Malik Abbasi is a 59 y.o. male \with a past medical history of rapid atrial fibrillation status post DCCV on Multaq/Xarelto, GERD, hypertension, hyperlipidemia, MANDA on nightly CPAP, morbid obesity who presents to the ER due to palpitations morning.  Patient states he woke up and felt his heart was beating really fast and irregularly.  He called his cardiologist office and was able to get a walk-in appointment.  Upon arrival there he had an EKG done showed that he was in to rapid rate and was advised to come to the ER for definitive management.     Upon arrival here patient was found to be tachycardic up to the 140s occasionally.  EKG showed rapid atrial flutter with variable block and lab workup including troponin was unremarkable.  Chest x-ray was done and did not show any pneumonia or acute pulmonary finding.  Patient's cardiology was contacted by the ER and he advised initiation of Cardizem and a Cardizem drip.  This was initiated and then the hospitalist service contacted for admission.  At the time exam patient is resting comfortably and is asymptomatic.  Denies having any chest pain currently.  Denies any recent illness.  No recent change in medications and has been adherent to all his medications.  He is on an exercise plan and has been actively losing weight on purpose.  Denies any diarrhea or vomiting.    Interval Followup:   Patient is sitting up in bed, denies any chest pain or palpitations. States he had converted to NSR, but then went back into aflutter. Denies any other complaints.     Review of Systems   Respiratory:  Negative for  "shortness of breath.    Cardiovascular:  Negative for chest pain and palpitations.         Objective   Objective       Vital Signs  Temp:  [97.2 °F (36.2 °C)-97.9 °F (36.6 °C)] 97.9 °F (36.6 °C)  Heart Rate:  [] 40  Resp:  [16-20] 18  BP: ()/(67-99) 118/70  Oxygen Therapy  SpO2: 96 %  Pulse Oximetry Type: Intermittent  Device (Oxygen Therapy): room air  ETCO2 (mmHg): 33 mmHg  Flowsheet Rows      Flowsheet Row First Filed Value   Admission Height 182.9 cm (72\") Documented at 07/25/2024 1522   Admission Weight 150 kg (330 lb 11 oz) Documented at 07/25/2024 1522          Intake & Output (last 3 days)         07/23 0701  07/24 0700 07/24 0701 07/25 0700 07/25 0701 07/26 0700 07/26 0701 07/27 0700    P.O.   240     Total Intake(mL/kg)   240 (1.8)     Net   +240                   Lines, Drains & Airways       Active LDAs       Name Placement date Placement time Site Days    Peripheral IV 07/25/24 1654 Left Antecubital 07/25/24  1654  Antecubital  1                    Physical Exam  Vitals reviewed.   Constitutional:       General: He is not in acute distress.     Appearance: He is obese.   HENT:      Head: Normocephalic and atraumatic.      Mouth/Throat:      Mouth: Mucous membranes are moist.   Cardiovascular:      Rate and Rhythm: Normal rate. Rhythm irregular.   Pulmonary:      Effort: Pulmonary effort is normal. No respiratory distress.   Abdominal:      General: Bowel sounds are normal. There is no distension.      Palpations: Abdomen is soft.      Tenderness: There is no abdominal tenderness.   Neurological:      Mental Status: He is alert.   Psychiatric:         Mood and Affect: Mood normal.         Behavior: Behavior normal.           Results Review:      Results from last 7 days   Lab Units 07/26/24  0503 07/25/24  1528   WBC 10*3/mm3 9.13 8.18   HEMOGLOBIN g/dL 16.7 17.3   HEMATOCRIT % 48.0 49.2   PLATELETS 10*3/mm3 236 262     Results from last 7 days   Lab Units 07/26/24  0503 07/25/24  1528 " "  SODIUM mmol/L 141 139   POTASSIUM mmol/L 3.5 3.9   CHLORIDE mmol/L 105 101   CO2 mmol/L 22.9 23.0   BUN mg/dL 21 21   CREATININE mg/dL 1.01 1.09   CALCIUM mg/dL 9.0 9.4   BILIRUBIN mg/dL  --  0.8   ALK PHOS U/L  --  84   ALT (SGPT) U/L  --  27   AST (SGOT) U/L  --  26   GLUCOSE mg/dL 100* 104*       Results from last 7 days   Lab Units 07/26/24  0503 07/25/24  1528   MAGNESIUM mg/dL 2.0 2.0       No results found for: \"BLOODCX\"    No results found for: \"MRSACX\"    No results found for: \"STOOLCX\"    No results found for: \"URINECX\"    No results found for: \"WOUNDCX\"    Imaging Results (Last 24 Hours)       ** No results found for the last 24 hours. **            I have personally reviewed the patient's new imaging and lab results.          ECG/EMG Results (most recent)       Procedure Component Value Units Date/Time    ECG 12 Lead ED Triage Standing Order; Chest Pain [[id number]] Collected: 07/25/24 1724     Updated: 07/25/24 1725     QT Interval 469 ms      QTC Interval 559 ms     Narrative:      HEART RATE=85  bpm  RR Wmfcrllm=401  ms  NY Interval=  ms  P Horizontal Axis=  deg  P Front Axis=  deg  QRSD Dliritdc=504  ms  QT Yxxfzyvh=112  ms  FGvN=602  ms  QRS Axis=-57  deg  T Wave Axis=7  deg  - ABNORMAL ECG -  Atrial flutter with varied AV block,  Borderline IVCD with LAD  Inferior infarct, age indeterminate  Consider anterior infarct  Prolonged QT interval  Date and Time of Study:2024-07-25 17:24:17    ECG 12 Lead ED Triage Standing Order; Chest Pain [[id number]] Collected: 07/25/24 1518     Updated: 07/25/24 1741     QT Interval 359 ms      QTC Interval 512 ms     Narrative:      HEART QXZQ=878  bpm  RR Nspvpnjn=937  ms  NY Dgeovusb=763  ms  P Horizontal Axis=115  deg  P Front Axis=185  deg  QRSD Gyxtyqnn=255  ms  QT Uxbfijra=056  ms  MOqV=062  ms  QRS Axis=-74  deg  T Wave Axis=25  deg  - ABNORMAL ECG -  Sinus or ectopic atrial tachycardia  Nonspecific  intraventricular conduction delay  Abnormal R-wave " progression, late transition  Inferior  infarct, old  Prolonged QT interval  When compared with ECG of 15-Nov-2019 09:02:15,  Significant change in rhythm: previously sinus  Significant repolarization change  Electronically Signed By: Parvez Lynch ([facility]) 2024-07-25 17:41:12  Date and Time of Study:2024-07-25 15:18:24    Adult Transthoracic Echo Complete W/ Cont if Necessary Per Protocol [[id number]] Resulted: 07/26/24 0938     Updated: 07/26/24 0943     EF(MOD-bp) 53.9 %      LVIDd 4.6 cm      LVIDs 3.4 cm      IVSd 1.26 cm      LVPWd 1.41 cm      FS 25.5 %      IVS/LVPW 0.89 cm      ESV(cubed) 39.8 ml      EDV(cubed) 96.2 ml      LV mass(C)d 238.7 grams      LVOT area 3.2 cm2      LVOT diam 2.01 cm      EDV(MOD-sp2) 102.0 ml      EDV(MOD-sp4) 110.0 ml      ESV(MOD-sp2) 47.3 ml      ESV(MOD-sp4) 47.1 ml      SV(MOD-sp2) 54.7 ml      SV(MOD-sp4) 62.9 ml      EF(MOD-sp2) 53.6 %      EF(MOD-sp4) 57.2 %      MV E max huy 99.2 cm/sec      MV A max huy 49.0 cm/sec      MV dec time 0.12 sec      Med Peak E' Huy 10.8 cm/sec      Lat Peak E' Huy 12.9 cm/sec      Avg E/e' ratio 8.37     RVIDd 2.9 cm      LA dimension (2D)  3.5 cm      MV dec slope 853.4 cm/sec2      Ao root diam 3.2 cm      IVRT 85.0 ms     Narrative:        Left ventricular ejection fraction appears to be 51 - 55%.    Left ventricular wall thickness is consistent with mild concentric   hypertrophy.    Left ventricular diastolic function was indeterminate.              Results for orders placed during the hospital encounter of 07/25/24    Adult Transthoracic Echo Complete W/ Cont if Necessary Per Protocol    Interpretation Summary    Left ventricular ejection fraction appears to be 51 - 55%.    Left ventricular wall thickness is consistent with mild concentric hypertrophy.    Left ventricular diastolic function was indeterminate.          Medication Review:     Scheduled Meds:allopurinol, 300 mg, Oral, Nightly  [Held by provider] amLODIPine, 5 mg, Oral,  BID  aspirin, 81 mg, Oral, Daily  dronedarone, 400 mg, Oral, BID With Meals  losartan, 100 mg, Oral, Q24H   And  hydroCHLOROthiazide, 25 mg, Oral, Q24H  metoprolol succinate XL, 50 mg, Oral, Daily  pantoprazole, 40 mg, Oral, Daily  rivaroxaban, 20 mg, Oral, Daily With Dinner  rosuvastatin, 10 mg, Oral, Nightly  sodium chloride, 10 mL, Intravenous, Q12H      Continuous Infusions:dilTIAZem, 5-15 mg/hr, Last Rate: 5 mg/hr (07/25/24 9086)      PRN Meds:.  acetaminophen    aluminum-magnesium hydroxide-simethicone    senna-docusate sodium **AND** polyethylene glycol **AND** bisacodyl **AND** bisacodyl    melatonin    meperidine    nitroglycerin    sodium chloride    sodium chloride    sodium chloride      I have reviewed the patient's current medication list    Assessment & Plan   Assessment / Plan       Active Hospital Problems    Diagnosis  POA    **Rapid atrial fibrillation [I48.91]  Yes     Assessment/Plan:   Rapid atrial flutter with variable block  Hypertension  Hyperlipidemia  GERD  MANDA on nightly CPAP  Gout  Obesity, BMI 39.4     Plan  - Continue Cardizem drip, cardiology consultation.      Continue telemetry monitoring with goal heart rate less than 110.    We will continue his Xarelto for stroke prophylaxis.    TSH 4.3   Echo -pending   -- Patient to undergo cardioversion   - Continue home antihypertensives. Patient is requesting to take all his home medications himself, I advised him that I am okay with this as long as he informed the nurses with each pill that he takes exactly what he is taking and it is profiled into our electronic record  - Continue PPI  - Continue nightly CPAP, patient to bring his own from home  - Continue allopurinol       DVT Prophylaxis  - Xarelto     Code Status  - Full Code    Plan for disposition:home once cleared by cardiology     Michael Martins DO  07/26/24  17:22 EDT          Note disclaimer: At [facility], we believe that sharing information builds trust and better  relationships. You are receiving this note because you recently visited [facility]. It is possible you will see health information before a provider has talked with you about it. This kind of information can be easy to misunderstand. To help you fully understand what it means for your health, we urge you to discuss this note with your provider.

## 2024-07-26 NOTE — PLAN OF CARE
Goal Outcome Evaluation:  Plan of Care Reviewed With: patient           Outcome Evaluation: Patient is AO x4, able to make needs known. Patient was cardioverted today, sinus miranda after cardioversion. Pt is now NSR on monitor. No acute changes throughout shift. No complaints of pain or discomfort at this time. VSS. Continue with current plan of care.

## 2024-07-27 ENCOUNTER — READMISSION MANAGEMENT (OUTPATIENT)
Dept: CALL CENTER | Facility: HOSPITAL | Age: 59
End: 2024-07-27
Payer: OTHER GOVERNMENT

## 2024-07-27 VITALS
OXYGEN SATURATION: 100 % | TEMPERATURE: 97.2 F | DIASTOLIC BLOOD PRESSURE: 84 MMHG | SYSTOLIC BLOOD PRESSURE: 130 MMHG | HEART RATE: 56 BPM | WEIGHT: 290.57 LBS | RESPIRATION RATE: 18 BRPM | HEIGHT: 72 IN | BODY MASS INDEX: 39.36 KG/M2

## 2024-07-27 PROBLEM — I48.91 RAPID ATRIAL FIBRILLATION: Status: RESOLVED | Noted: 2024-07-25 | Resolved: 2024-07-27

## 2024-07-27 LAB
ANION GAP SERPL CALCULATED.3IONS-SCNC: 10.5 MMOL/L (ref 5–15)
BASOPHILS # BLD AUTO: 0.06 10*3/MM3 (ref 0–0.2)
BASOPHILS NFR BLD AUTO: 0.8 % (ref 0–1.5)
BUN SERPL-MCNC: 23 MG/DL (ref 8–23)
BUN/CREAT SERPL: 21.9 (ref 7–25)
CALCIUM SPEC-SCNC: 9 MG/DL (ref 8.6–10.5)
CHLORIDE SERPL-SCNC: 104 MMOL/L (ref 98–107)
CO2 SERPL-SCNC: 25.5 MMOL/L (ref 22–29)
CREAT SERPL-MCNC: 1.05 MG/DL (ref 0.76–1.27)
DEPRECATED RDW RBC AUTO: 43.3 FL (ref 37–54)
EGFRCR SERPLBLD CKD-EPI 2021: 80.8 ML/MIN/1.73
EOSINOPHIL # BLD AUTO: 0.23 10*3/MM3 (ref 0–0.4)
EOSINOPHIL NFR BLD AUTO: 3.1 % (ref 0.3–6.2)
ERYTHROCYTE [DISTWIDTH] IN BLOOD BY AUTOMATED COUNT: 13.2 % (ref 12.3–15.4)
GLUCOSE SERPL-MCNC: 101 MG/DL (ref 65–99)
HCT VFR BLD AUTO: 46.2 % (ref 37.5–51)
HGB BLD-MCNC: 15.3 G/DL (ref 13–17.7)
IMM GRANULOCYTES # BLD AUTO: 0.02 10*3/MM3 (ref 0–0.05)
IMM GRANULOCYTES NFR BLD AUTO: 0.3 % (ref 0–0.5)
LYMPHOCYTES # BLD AUTO: 1.85 10*3/MM3 (ref 0.7–3.1)
LYMPHOCYTES NFR BLD AUTO: 25.1 % (ref 19.6–45.3)
MAGNESIUM SERPL-MCNC: 1.9 MG/DL (ref 1.6–2.4)
MCH RBC QN AUTO: 29.8 PG (ref 26.6–33)
MCHC RBC AUTO-ENTMCNC: 33.1 G/DL (ref 31.5–35.7)
MCV RBC AUTO: 89.9 FL (ref 79–97)
MONOCYTES # BLD AUTO: 0.46 10*3/MM3 (ref 0.1–0.9)
MONOCYTES NFR BLD AUTO: 6.3 % (ref 5–12)
NEUTROPHILS NFR BLD AUTO: 4.74 10*3/MM3 (ref 1.7–7)
NEUTROPHILS NFR BLD AUTO: 64.4 % (ref 42.7–76)
NRBC BLD AUTO-RTO: 0 /100 WBC (ref 0–0.2)
PLATELET # BLD AUTO: 199 10*3/MM3 (ref 140–450)
PMV BLD AUTO: 10.3 FL (ref 6–12)
POTASSIUM SERPL-SCNC: 3.6 MMOL/L (ref 3.5–5.2)
RBC # BLD AUTO: 5.14 10*6/MM3 (ref 4.14–5.8)
SODIUM SERPL-SCNC: 140 MMOL/L (ref 136–145)
WBC NRBC COR # BLD AUTO: 7.36 10*3/MM3 (ref 3.4–10.8)

## 2024-07-27 PROCEDURE — 99238 HOSP IP/OBS DSCHRG MGMT 30/<: CPT | Performed by: INTERNAL MEDICINE

## 2024-07-27 PROCEDURE — 92960 CARDIOVERSION ELECTRIC EXT: CPT

## 2024-07-27 PROCEDURE — 94799 UNLISTED PULMONARY SVC/PX: CPT

## 2024-07-27 PROCEDURE — 83735 ASSAY OF MAGNESIUM: CPT | Performed by: STUDENT IN AN ORGANIZED HEALTH CARE EDUCATION/TRAINING PROGRAM

## 2024-07-27 PROCEDURE — 80048 BASIC METABOLIC PNL TOTAL CA: CPT | Performed by: STUDENT IN AN ORGANIZED HEALTH CARE EDUCATION/TRAINING PROGRAM

## 2024-07-27 PROCEDURE — 85025 COMPLETE CBC W/AUTO DIFF WBC: CPT | Performed by: STUDENT IN AN ORGANIZED HEALTH CARE EDUCATION/TRAINING PROGRAM

## 2024-07-27 PROCEDURE — G0378 HOSPITAL OBSERVATION PER HR: HCPCS

## 2024-07-27 RX ADMIN — ASPIRIN 81 MG: 81 TABLET, COATED ORAL at 08:36

## 2024-07-27 RX ADMIN — DRONEDARONE 400 MG: 400 TABLET, FILM COATED ORAL at 08:36

## 2024-07-27 RX ADMIN — HYDROCHLOROTHIAZIDE 25 MG: 25 TABLET ORAL at 08:36

## 2024-07-27 RX ADMIN — PANTOPRAZOLE SODIUM 40 MG: 40 TABLET, DELAYED RELEASE ORAL at 08:35

## 2024-07-27 RX ADMIN — Medication 10 ML: at 08:36

## 2024-07-27 RX ADMIN — LOSARTAN POTASSIUM 100 MG: 50 TABLET, FILM COATED ORAL at 08:35

## 2024-07-27 RX ADMIN — METOPROLOL SUCCINATE 50 MG: 50 TABLET, EXTENDED RELEASE ORAL at 08:36

## 2024-07-27 NOTE — PROGRESS NOTES
Date of service: 7/27/2024  Subjective:  No chest pain, SOB, palpitations or dizziness    Review of systems: No no headache, vertigo, tinnitus, nausea, vomiting, abdominal pain, dysuria, hematuria, emesis, TIA or CVA-like symptoms.    Physical examination: He was in no pain or respiratory distress  Vital signs: Reviewed  HEENT: Sclerae: Nonicteric.    Neck: No JVD or carotid bruit.    Chest: CTA  Cardiac: Tachycardic.  RRR.  No murmurs or S3 gallop  Abdomen: Morbidly obese, soft and nontender.  No apparent megalies or masses  Extremities: No edema, cyanosis or clubbing.  Pulses: Intact.  Neuro: Alert, oriented x 3, no facial droop and speech was clear.     Records: Reviewed.     Assessment and plan:     1.  Atrial flutter with RVR status post DC cardioversion on 7/26/2024.  2.  Remote DC cardioversion for atrial flutter in approximately 2012.  3.  No evidence of acute coronary syndrome or acute CHF  4.  Hypertension  5.  Discussed the echo results with him and family.  6.  Continue the same cardiac medications.  7.  Cardiac: Stable.  He may be discharged today and follow-up will be as previously scheduled.

## 2024-07-27 NOTE — PLAN OF CARE
Goal Outcome Evaluation:  Plan of Care Reviewed With: patient        Progress: improving  Outcome Evaluation: Patient is alert and oriented. Has been sinus miranda on monitor. No complaints of pain. Patient is planning for discharging.

## 2024-07-27 NOTE — PLAN OF CARE
Goal Outcome Evaluation:                                   VSS,a&ox4, room air, SB on tele, denies pain/discomfort, able to voice needs to staff, no concerns to report, continue POC.

## 2024-07-27 NOTE — OUTREACH NOTE
Prep Survey      Flowsheet Row Responses   [facility] [place] facility patient discharged from? [facility]   Is LACE score < 7 ? Yes   Eligibility Not Eligible   What are the reasons patient is not eligible? Other  [LOW RISK FOR READMISSION]   Does the patient have one of the following disease processes/diagnoses(primary or secondary)? Other   Prep survey completed? Yes            Maira JOHNSON - Registered Nurse

## 2024-07-27 NOTE — DISCHARGE SUMMARY
Date of Admission: 7/25/2024    Date of Discharge:  7/27/2024    Length of stay:  LOS: 1 day     Admission Diagnosis:   Rapid atrial fibrillation [I48.91]      Discharge Diagnosis:   Rapid atrial flutter with variable block s/p DC cardioversion 7/26/24  Hypertension  Hyperlipidemia  GERD  MANDA on nightly CPAP  Gout  Obesity, BMI 39.4      Active Hospital Problems:    Active Hospital Problems   No active problems to display.      Resolved Hospital Problems    Diagnosis Date Resolved POA    **Rapid atrial fibrillation [I48.91] 07/27/2024 Yes       Hospital Course:  Malik Abbasi is a 59 y.o. male with a past medical history of rapid atrial fibrillation status post DCCV on Multaq/Xarelto, GERD, hypertension, hyperlipidemia, MANDA on nightly CPAP, morbid obesity who presents to the ER due to palpitations earlier this am morning.  Patient states he woke up and felt his heart was beating really fast and irregularly.  He called his cardiologist office and was able to get a walk-in appointment.  Upon arrival there he had an EKG done showed that he was in to rapid rate and was advised to come to the ER for definitive management.     Upon arrival here patient was found to be tachycardic up to the 140s occasionally.  EKG showed rapid atrial flutter with variable block and lab workup including troponin was unremarkable.  Chest x-ray was done and did not show any pneumonia or acute pulmonary finding.  Patient's cardiology was contacted by the ER and he advised initiation of Cardizem and a Cardizem drip.  This was initiated and then the hospitalist service contacted for admission.  At the time exam patient is resting comfortably and is asymptomatic.  Denies having any chest pain currently.  Denies any recent illness.  No recent change in medications and has been adherent to all his medications.  He is on an exercise plan and has been actively losing weight on purpose.      Patient was seen by his cardiologist   [facility], he underwent DC cardioversion on 7/26/24 with return to NSR. He was monitored overnight without further issue. He remains in NSR. He will be discharged home in stable condition and will continue his home regimen.        Procedures Performed:  Cardioversion        Consults:   Consults       No orders found from 6/26/2024 to 7/26/2024.            Vital Signs:  Temp:  [97.2 °F (36.2 °C)-97.7 °F (36.5 °C)] 97.2 °F (36.2 °C)  Heart Rate:  [55-75] 56  Resp:  [18] 18  BP: (118-145)/(68-88) 130/84  Body mass index is 39.41 kg/m².    Physical Exam:    Physical Exam  Vitals reviewed.   Constitutional:       General: He is not in acute distress.     Appearance: He is obese.   Cardiovascular:      Rate and Rhythm: Normal rate and regular rhythm.   Pulmonary:      Effort: Pulmonary effort is normal. No respiratory distress.   Neurological:      Mental Status: He is alert.   Psychiatric:         Mood and Affect: Mood normal.         Behavior: Behavior normal.             Pertinent Test Results:   Lab Results (last 72 hours)       Procedure Component Value Units Date/Time    Magnesium [[id number]]  (Normal) Collected: 07/27/24 0501    Specimen: Blood Updated: 07/27/24 0601     Magnesium 1.9 mg/dL     Basic Metabolic Panel [[id number]]  (Abnormal) Collected: 07/27/24 0501    Specimen: Blood Updated: 07/27/24 0601     Glucose 101 mg/dL      BUN 23 mg/dL      Creatinine 1.05 mg/dL      Sodium 140 mmol/L      Potassium 3.6 mmol/L      Comment: Slight hemolysis detected by analyzer. Result may be falsely elevated.        Chloride 104 mmol/L      CO2 25.5 mmol/L      Calcium 9.0 mg/dL      BUN/Creatinine Ratio 21.9     Anion Gap 10.5 mmol/L      eGFR 80.8 mL/min/1.73     Narrative:      GFR Normal >60  Chronic Kidney Disease <60  Kidney Failure <15      CBC & Differential [[id number]]  (Normal) Collected: 07/27/24 0501    Specimen: Blood Updated: 07/27/24 0541    Narrative:      The following orders were created for panel order  CBC & Differential.  Procedure                               Abnormality         Status                     ---------                               -----------         ------                     CBC Auto Differential[[id number]]        Normal              Final result                 Please view results for these tests on the individual orders.    CBC Auto Differential [[id number]]  (Normal) Collected: 07/27/24 0501    Specimen: Blood Updated: 07/27/24 0541     WBC 7.36 10*3/mm3      RBC 5.14 10*6/mm3      Hemoglobin 15.3 g/dL      Hematocrit 46.2 %      MCV 89.9 fL      MCH 29.8 pg      MCHC 33.1 g/dL      RDW 13.2 %      RDW-SD 43.3 fl      MPV 10.3 fL      Platelets 199 10*3/mm3      Neutrophil % 64.4 %      Lymphocyte % 25.1 %      Monocyte % 6.3 %      Eosinophil % 3.1 %      Basophil % 0.8 %      Immature Grans % 0.3 %      Neutrophils, Absolute 4.74 10*3/mm3      Lymphocytes, Absolute 1.85 10*3/mm3      Monocytes, Absolute 0.46 10*3/mm3      Eosinophils, Absolute 0.23 10*3/mm3      Basophils, Absolute 0.06 10*3/mm3      Immature Grans, Absolute 0.02 10*3/mm3      nRBC 0.0 /100 WBC     Basic Metabolic Panel [[id number]]  (Abnormal) Collected: 07/26/24 0503    Specimen: Blood Updated: 07/26/24 0731     Glucose 100 mg/dL      BUN 21 mg/dL      Creatinine 1.01 mg/dL      Sodium 141 mmol/L      Potassium 3.5 mmol/L      Chloride 105 mmol/L      CO2 22.9 mmol/L      Calcium 9.0 mg/dL      BUN/Creatinine Ratio 20.8     Anion Gap 13.1 mmol/L      eGFR 84.6 mL/min/1.73     Narrative:      GFR Normal >60  Chronic Kidney Disease <60  Kidney Failure <15      Magnesium [[id number]]  (Normal) Collected: 07/26/24 0503    Specimen: Blood Updated: 07/26/24 0731     Magnesium 2.0 mg/dL     High Sensitivity Troponin T [[id number]]  (Normal) Collected: 07/26/24 0503    Specimen: Blood Updated: 07/26/24 0731     HS Troponin T 21 ng/L     Narrative:      High Sensitive Troponin T Reference Range:  <14.0 ng/L- Negative Female for  AMI  <22.0 ng/L- Negative Male for AMI  >=14 - Abnormal Female indicating possible myocardial injury.  >=22 - Abnormal Male indicating possible myocardial injury.   Clinicians would have to utilize clinical acumen, EKG, Troponin, and serial changes to determine if it is an Acute Myocardial Infarction or myocardial injury due to an underlying chronic condition.         CBC & Differential [[id number]]  (Normal) Collected: 07/26/24 0503    Specimen: Blood Updated: 07/26/24 0702    Narrative:      The following orders were created for panel order CBC & Differential.  Procedure                               Abnormality         Status                     ---------                               -----------         ------                     CBC Auto Differential[[id number]]        Normal              Final result                 Please view results for these tests on the individual orders.    CBC Auto Differential [[id number]]  (Normal) Collected: 07/26/24 0503    Specimen: Blood Updated: 07/26/24 0702     WBC 9.13 10*3/mm3      RBC 5.39 10*6/mm3      Hemoglobin 16.7 g/dL      Hematocrit 48.0 %      MCV 89.1 fL      MCH 31.0 pg      MCHC 34.8 g/dL      RDW 13.3 %      RDW-SD 43.0 fl      MPV 10.7 fL      Platelets 236 10*3/mm3      Neutrophil % 66.0 %      Lymphocyte % 22.9 %      Monocyte % 7.6 %      Eosinophil % 2.4 %      Basophil % 0.8 %      Immature Grans % 0.3 %      Neutrophils, Absolute 6.03 10*3/mm3      Lymphocytes, Absolute 2.09 10*3/mm3      Monocytes, Absolute 0.69 10*3/mm3      Eosinophils, Absolute 0.22 10*3/mm3      Basophils, Absolute 0.07 10*3/mm3      Immature Grans, Absolute 0.03 10*3/mm3      nRBC 0.0 /100 WBC     TSH [[id number]]  (Abnormal) Collected: 07/25/24 1853    Specimen: Blood from Hand, Left Updated: 07/25/24 2038     TSH 4.300 uIU/mL     High Sensitivity Troponin T 2Hr [[id number]]  (Normal) Collected: 07/25/24 1853    Specimen: Blood from Hand, Left Updated: 07/25/24 1929     HS Troponin T 9  ng/L      Troponin T Delta -1 ng/L     Narrative:      High Sensitive Troponin T Reference Range:  <14.0 ng/L- Negative Female for AMI  <22.0 ng/L- Negative Male for AMI  >=14 - Abnormal Female indicating possible myocardial injury.  >=22 - Abnormal Male indicating possible myocardial injury.   Clinicians would have to utilize clinical acumen, EKG, Troponin, and serial changes to determine if it is an Acute Myocardial Infarction or myocardial injury due to an underlying chronic condition.         Comprehensive Metabolic Panel [[id number]]  (Abnormal) Collected: 07/25/24 1528    Specimen: Blood from Hand, Right Updated: 07/25/24 1620     Glucose 104 mg/dL      BUN 21 mg/dL      Creatinine 1.09 mg/dL      Sodium 139 mmol/L      Potassium 3.9 mmol/L      Chloride 101 mmol/L      CO2 23.0 mmol/L      Calcium 9.4 mg/dL      Total Protein 7.6 g/dL      Albumin 4.3 g/dL      ALT (SGPT) 27 U/L      AST (SGOT) 26 U/L      Alkaline Phosphatase 84 U/L      Total Bilirubin 0.8 mg/dL      Globulin 3.3 gm/dL      A/G Ratio 1.3 g/dL      BUN/Creatinine Ratio 19.3     Anion Gap 15.0 mmol/L      eGFR 77.2 mL/min/1.73     Narrative:      GFR Normal >60  Chronic Kidney Disease <60  Kidney Failure <15      Magnesium [[id number]]  (Normal) Collected: 07/25/24 1528    Specimen: Blood from Hand, Right Updated: 07/25/24 1620     Magnesium 2.0 mg/dL     High Sensitivity Troponin T [[id number]]  (Normal) Collected: 07/25/24 1528    Specimen: Blood from Hand, Right Updated: 07/25/24 1620     HS Troponin T 10 ng/L     Narrative:      High Sensitive Troponin T Reference Range:  <14.0 ng/L- Negative Female for AMI  <22.0 ng/L- Negative Male for AMI  >=14 - Abnormal Female indicating possible myocardial injury.  >=22 - Abnormal Male indicating possible myocardial injury.   Clinicians would have to utilize clinical acumen, EKG, Troponin, and serial changes to determine if it is an Acute Myocardial Infarction or myocardial injury due to an underlying  chronic condition.         Lipase [[id number]]  (Normal) Collected: 07/25/24 1528    Specimen: Blood from Hand, Right Updated: 07/25/24 1620     Lipase 26 U/L     BNP [[id number]]  (Normal) Collected: 07/25/24 1528    Specimen: Blood from Hand, Right Updated: 07/25/24 1618     proBNP 298.1 pg/mL     Narrative:      This assay is used as an aid in the diagnosis of individuals suspected of having heart failure. It can be used as an aid in the diagnosis of acute decompensated heart failure (ADHF) in patients presenting with signs and symptoms of ADHF to the emergency department (ED). In addition, NT-proBNP of <300 pg/mL indicates ADHF is not likely.    Age Range Result Interpretation  NT-proBNP Concentration (pg/mL:      <50             Positive            >450                   Gray                 300-450                    Negative             <300    50-75           Positive            >900                  Gray                300-900                  Negative            <300      >75             Positive            >1800                  Gray                300-1800                  Negative            <300    [place] Draw [[id number]] Collected: 07/25/24 1528    Specimen: Blood from Hand, Right Updated: 07/25/24 1600    Narrative:      The following orders were created for panel order [place] [facility].  Procedure                               Abnormality         Status                     ---------                               -----------         ------                     Green Top (Gel)[[id number]]                                  Final result               Lavender Top[[id number]]                                     Final result               Gold Top - SST[[id number]]                                   Final result               Light Blue Top[[id number]]                                   Final result                 Please view results for these tests on the individual orders.    Green Top (Gel) [[id number]] Collected:  07/25/24 1528    Specimen: Blood from Hand, Right Updated: 07/25/24 1600     Extra Tube Hold for add-ons.     Comment: Auto resulted.       Lavender Top [[id number]] Collected: 07/25/24 1528    Specimen: Blood from Hand, Right Updated: 07/25/24 1600     Extra Tube hold for add-on     Comment: Auto resulted       Gold Top - SST [[id number]] Collected: 07/25/24 1528    Specimen: Blood from Hand, Right Updated: 07/25/24 1600     Extra Tube Hold for add-ons.     Comment: Auto resulted.       Light Blue Top [[id number]] Collected: 07/25/24 1528    Specimen: Blood from Hand, Right Updated: 07/25/24 1600     Extra Tube Hold for add-ons.     Comment: Auto resulted       CBC & Differential [[id number]]  (Normal) Collected: 07/25/24 1528    Specimen: Blood from Hand, Right Updated: 07/25/24 1558    Narrative:      The following orders were created for panel order CBC & Differential.  Procedure                               Abnormality         Status                     ---------                               -----------         ------                     CBC Auto Differential[[id number]]        Normal              Final result                 Please view results for these tests on the individual orders.    CBC Auto Differential [[id number]]  (Normal) Collected: 07/25/24 1528    Specimen: Blood from Hand, Right Updated: 07/25/24 1558     WBC 8.18 10*3/mm3      RBC 5.69 10*6/mm3      Hemoglobin 17.3 g/dL      Hematocrit 49.2 %      MCV 86.5 fL      MCH 30.4 pg      MCHC 35.2 g/dL      RDW 13.2 %      RDW-SD 40.7 fl      MPV 10.2 fL      Platelets 262 10*3/mm3      Neutrophil % 66.9 %      Lymphocyte % 24.2 %      Monocyte % 6.0 %      Eosinophil % 1.7 %      Basophil % 1.0 %      Immature Grans % 0.2 %      Neutrophils, Absolute 5.47 10*3/mm3      Lymphocytes, Absolute 1.98 10*3/mm3      Monocytes, Absolute 0.49 10*3/mm3      Eosinophils, Absolute 0.14 10*3/mm3      Basophils, Absolute 0.08 10*3/mm3      Immature Grans, Absolute 0.02  10*3/mm3      nRBC 0.0 /100 WBC           Imaging Results (Most Recent)       Procedure Component Value Units Date/Time    XR Chest 1 View [[id number]] Collected: 07/25/24 1602     Updated: 07/25/24 1608    Narrative:      XR CHEST 1 VW    Date of Exam: 7/25/2024 3:40 PM EDT    Indication: Chest Pain Triage Protocol    Comparison: 5/24/2019    Findings:  Study is limited by overlying support and monitoring apparatus. The heart and mediastinal contours are within normal limits. Lung volumes are low. No focal consolidation noted. No acute osseous abnormality      Impression:      Impression:  Limited negative low lung volume portable chest      Electronically Signed: Dario Gillespie MD    7/25/2024 4:06 PM EDT    Workstation ID: [id number]            Results for orders placed during the hospital encounter of 07/25/24    Adult Transthoracic Echo Complete W/ Cont if Necessary Per Protocol    Interpretation Summary    Left ventricular ejection fraction appears to be 51 - 55%.    Left ventricular wall thickness is consistent with mild concentric hypertrophy.    Left ventricular diastolic function was indeterminate.      Imaging Results (All)       Procedure Component Value Units Date/Time    XR Chest 1 View [[id number]] Collected: 07/25/24 1602     Updated: 07/25/24 1608    Narrative:      XR CHEST 1 VW    Date of Exam: 7/25/2024 3:40 PM EDT    Indication: Chest Pain Triage Protocol    Comparison: 5/24/2019    Findings:  Study is limited by overlying support and monitoring apparatus. The heart and mediastinal contours are within normal limits. Lung volumes are low. No focal consolidation noted. No acute osseous abnormality      Impression:      Impression:  Limited negative low lung volume portable chest      Electronically Signed: Dario Gillespie MD    7/25/2024 4:06 PM EDT    Workstation ID: [id number]                  Discharge Disposition:  Home or Self Care    Discharge Medications:     Discharge Medications         Continue These Medications        Instructions Start Date   allopurinol 300 MG tablet  Commonly known as: ZYLOPRIM   300 mg, Oral, Daily      amLODIPine 5 MG tablet  Commonly known as: NORVASC   5 mg, Oral, 2 Times Daily      aspirin 81 MG EC tablet   Take 1 tablet by mouth Daily.      Benicar HCT 40-25 MG per tablet  Generic drug: olmesartan-hydrochlorothiazide   1 tablet, Oral, Daily      indomethacin 50 MG capsule  Commonly known as: INDOCIN   50 mg, Oral, As Needed      metoprolol succinate XL 50 MG 24 hr tablet  Commonly known as: TOPROL-XL   50 mg, Oral, Daily      Multaq 400 MG tablet  Generic drug: dronedarone   400 mg, Oral, 2 Times Daily With Meals      pantoprazole 40 MG EC tablet  Commonly known as: PROTONIX   40 mg, Oral, Daily      rosuvastatin 10 MG tablet  Commonly known as: CRESTOR   10 mg, Oral, Nightly      Xarelto 20 MG tablet  Generic drug: rivaroxaban   20 mg, Oral, Daily With Dinner               Kvng  not reviewed    Discharge Diet:   Diet Instructions       Diet: Cardiac Diets; Healthy Heart (2-3 Na+); Thin (IDDSI 0)      Discharge Diet: Cardiac Diets    Cardiac Diet: Healthy Heart (2-3 Na+)    Fluid Consistency: Thin (IDDSI 0)            Activity at Discharge:   Activity Instructions       Activity as Tolerated            Follow-up Appointments  No future appointments.        Test Results Pending at Discharge:  None    Condition on Discharge:    stable     Risk for Readmission (LACE): Score: 2 (7/27/2024  6:00 AM)          Michael Martins DO  07/27/24  16:01 EDT    Time: Discharge 25 min with face-to-face history/exam, writing all prescriptions, and documenting discharge data including care coordination            Note disclaimer: At [facility], we believe that sharing information builds trust and better relationships. You are receiving this note because you recently visited [facility]. It is possible you will see health information before a provider has talked with you  about it. This kind of information can be easy to misunderstand. To help you fully understand what it means for your health, we urge you to discuss this note with your provider.

## 2024-08-01 NOTE — PROCEDURES
Date of service: 7/27/2024    Procedure: DC cardioversion    Indications: Atrial fibrillation    Method: The procedure was explained to the patient and family and they agreed to proceed.  A consent form was signed.  He was prepped in the usual manner.  He was totally sedated with IV Versed and Demerol.  Synchronized 200 J x 1 was delivered.  He converted into sinus bradycardia.  There were no complications.

## 2024-09-12 ENCOUNTER — HOSPITAL ENCOUNTER (OUTPATIENT)
Facility: HOSPITAL | Age: 59
Setting detail: OBSERVATION
LOS: 1 days | Discharge: HOME | End: 2024-09-14
Attending: EMERGENCY MEDICINE | Admitting: HOSPITALIST
Payer: OTHER GOVERNMENT

## 2024-09-12 ENCOUNTER — APPOINTMENT (OUTPATIENT)
Dept: GENERAL RADIOLOGY | Facility: HOSPITAL | Age: 59
End: 2024-09-12
Payer: OTHER GOVERNMENT

## 2024-09-12 DIAGNOSIS — I48.92 ATRIAL FLUTTER WITH RAPID VENTRICULAR RESPONSE: Primary | ICD-10-CM

## 2024-09-12 LAB
ALBUMIN SERPL-MCNC: 3.8 G/DL (ref 3.5–5.2)
ALBUMIN/GLOB SERPL: 1 G/DL
ALP SERPL-CCNC: 83 U/L (ref 39–117)
ALT SERPL W P-5'-P-CCNC: 36 U/L (ref 1–41)
ANION GAP SERPL CALCULATED.3IONS-SCNC: 12 MMOL/L (ref 5–15)
AST SERPL-CCNC: 27 U/L (ref 1–40)
BASOPHILS # BLD AUTO: 0.08 10*3/MM3 (ref 0–0.2)
BASOPHILS NFR BLD AUTO: 1 % (ref 0–1.5)
BILIRUB SERPL-MCNC: 0.7 MG/DL (ref 0–1.2)
BUN SERPL-MCNC: 20 MG/DL (ref 8–23)
BUN/CREAT SERPL: 16.5 (ref 7–25)
CALCIUM SPEC-SCNC: 9.1 MG/DL (ref 8.6–10.5)
CHLORIDE SERPL-SCNC: 105 MMOL/L (ref 98–107)
CO2 SERPL-SCNC: 25 MMOL/L (ref 22–29)
CREAT SERPL-MCNC: 1.21 MG/DL (ref 0.76–1.27)
DEPRECATED RDW RBC AUTO: 41.9 FL (ref 37–54)
EGFRCR SERPLBLD CKD-EPI 2021: 68.1 ML/MIN/1.73
EOSINOPHIL # BLD AUTO: 0.32 10*3/MM3 (ref 0–0.4)
EOSINOPHIL NFR BLD AUTO: 4.2 % (ref 0.3–6.2)
ERYTHROCYTE [DISTWIDTH] IN BLOOD BY AUTOMATED COUNT: 13.1 % (ref 12.3–15.4)
GLOBULIN UR ELPH-MCNC: 3.9 GM/DL
GLUCOSE SERPL-MCNC: 102 MG/DL (ref 65–99)
HCT VFR BLD AUTO: 48.1 % (ref 37.5–51)
HGB BLD-MCNC: 16.6 G/DL (ref 13–17.7)
HOLD SPECIMEN: NORMAL
HOLD SPECIMEN: NORMAL
IMM GRANULOCYTES # BLD AUTO: 0.03 10*3/MM3 (ref 0–0.05)
IMM GRANULOCYTES NFR BLD AUTO: 0.4 % (ref 0–0.5)
LYMPHOCYTES # BLD AUTO: 1.97 10*3/MM3 (ref 0.7–3.1)
LYMPHOCYTES NFR BLD AUTO: 25.7 % (ref 19.6–45.3)
MAGNESIUM SERPL-MCNC: 2 MG/DL (ref 1.6–2.4)
MCH RBC QN AUTO: 30.3 PG (ref 26.6–33)
MCHC RBC AUTO-ENTMCNC: 34.5 G/DL (ref 31.5–35.7)
MCV RBC AUTO: 87.8 FL (ref 79–97)
MONOCYTES # BLD AUTO: 0.48 10*3/MM3 (ref 0.1–0.9)
MONOCYTES NFR BLD AUTO: 6.3 % (ref 5–12)
NEUTROPHILS NFR BLD AUTO: 4.8 10*3/MM3 (ref 1.7–7)
NEUTROPHILS NFR BLD AUTO: 62.4 % (ref 42.7–76)
NRBC BLD AUTO-RTO: 0 /100 WBC (ref 0–0.2)
PHOSPHATE SERPL-MCNC: 3.7 MG/DL (ref 2.5–4.5)
PLATELET # BLD AUTO: 319 10*3/MM3 (ref 140–450)
PMV BLD AUTO: 9.8 FL (ref 6–12)
POTASSIUM SERPL-SCNC: 3.8 MMOL/L (ref 3.5–5.2)
PROT SERPL-MCNC: 7.7 G/DL (ref 6–8.5)
QT INTERVAL: 346 MS
QTC INTERVAL: 497 MS
RBC # BLD AUTO: 5.48 10*6/MM3 (ref 4.14–5.8)
SODIUM SERPL-SCNC: 142 MMOL/L (ref 136–145)
TROPONIN T SERPL HS-MCNC: 14 NG/L
TSH SERPL DL<=0.05 MIU/L-ACNC: 2.47 UIU/ML (ref 0.27–4.2)
WBC NRBC COR # BLD AUTO: 7.68 10*3/MM3 (ref 3.4–10.8)
WHOLE BLOOD HOLD COAG: NORMAL
WHOLE BLOOD HOLD SPECIMEN: NORMAL

## 2024-09-12 PROCEDURE — 99285 EMERGENCY DEPT VISIT HI MDM: CPT

## 2024-09-12 PROCEDURE — 83735 ASSAY OF MAGNESIUM: CPT

## 2024-09-12 PROCEDURE — 71045 X-RAY EXAM CHEST 1 VIEW: CPT

## 2024-09-12 PROCEDURE — G0378 HOSPITAL OBSERVATION PER HR: HCPCS

## 2024-09-12 PROCEDURE — 99223 1ST HOSP IP/OBS HIGH 75: CPT | Performed by: HOSPITALIST

## 2024-09-12 PROCEDURE — 96365 THER/PROPH/DIAG IV INF INIT: CPT

## 2024-09-12 PROCEDURE — 84484 ASSAY OF TROPONIN QUANT: CPT

## 2024-09-12 PROCEDURE — 93005 ELECTROCARDIOGRAM TRACING: CPT | Performed by: EMERGENCY MEDICINE

## 2024-09-12 PROCEDURE — 93005 ELECTROCARDIOGRAM TRACING: CPT

## 2024-09-12 PROCEDURE — 96366 THER/PROPH/DIAG IV INF ADDON: CPT

## 2024-09-12 PROCEDURE — 25010000002 DIGOXIN PER 500 MCG: Performed by: HOSPITALIST

## 2024-09-12 PROCEDURE — 36415 COLL VENOUS BLD VENIPUNCTURE: CPT

## 2024-09-12 PROCEDURE — 96375 TX/PRO/DX INJ NEW DRUG ADDON: CPT

## 2024-09-12 PROCEDURE — 80053 COMPREHEN METABOLIC PANEL: CPT

## 2024-09-12 PROCEDURE — 84100 ASSAY OF PHOSPHORUS: CPT | Performed by: HOSPITALIST

## 2024-09-12 PROCEDURE — 85025 COMPLETE CBC W/AUTO DIFF WBC: CPT

## 2024-09-12 PROCEDURE — 84443 ASSAY THYROID STIM HORMONE: CPT

## 2024-09-12 RX ORDER — ONDANSETRON 2 MG/ML
4 INJECTION INTRAMUSCULAR; INTRAVENOUS EVERY 6 HOURS PRN
Status: DISCONTINUED | OUTPATIENT
Start: 2024-09-12 | End: 2024-09-14 | Stop reason: HOSPADM

## 2024-09-12 RX ORDER — SODIUM CHLORIDE 9 MG/ML
75 INJECTION, SOLUTION INTRAVENOUS CONTINUOUS
Status: DISCONTINUED | OUTPATIENT
Start: 2024-09-12 | End: 2024-09-12

## 2024-09-12 RX ORDER — PANTOPRAZOLE SODIUM 40 MG/1
40 TABLET, DELAYED RELEASE ORAL DAILY
Status: DISCONTINUED | OUTPATIENT
Start: 2024-09-13 | End: 2024-09-14 | Stop reason: HOSPADM

## 2024-09-12 RX ORDER — DIGOXIN 0.25 MG/ML
250 INJECTION INTRAMUSCULAR; INTRAVENOUS ONCE
Status: COMPLETED | OUTPATIENT
Start: 2024-09-12 | End: 2024-09-12

## 2024-09-12 RX ORDER — ALLOPURINOL 300 MG/1
300 TABLET ORAL DAILY
Status: DISCONTINUED | OUTPATIENT
Start: 2024-09-13 | End: 2024-09-14 | Stop reason: HOSPADM

## 2024-09-12 RX ORDER — SODIUM CHLORIDE 0.9 % (FLUSH) 0.9 %
10 SYRINGE (ML) INJECTION AS NEEDED
Status: DISCONTINUED | OUTPATIENT
Start: 2024-09-12 | End: 2024-09-14 | Stop reason: HOSPADM

## 2024-09-12 RX ORDER — ROSUVASTATIN CALCIUM 5 MG/1
10 TABLET, COATED ORAL NIGHTLY
Status: DISCONTINUED | OUTPATIENT
Start: 2024-09-12 | End: 2024-09-14 | Stop reason: HOSPADM

## 2024-09-12 RX ORDER — ERGOCALCIFEROL 1.25 MG/1
50000 CAPSULE, LIQUID FILLED ORAL
COMMUNITY
Start: 2024-08-09

## 2024-09-12 RX ORDER — ASPIRIN 81 MG/1
81 TABLET ORAL DAILY
Status: DISCONTINUED | OUTPATIENT
Start: 2024-09-13 | End: 2024-09-14 | Stop reason: HOSPADM

## 2024-09-12 RX ORDER — BISACODYL 5 MG/1
5 TABLET, DELAYED RELEASE ORAL DAILY PRN
Status: DISCONTINUED | OUTPATIENT
Start: 2024-09-12 | End: 2024-09-14 | Stop reason: HOSPADM

## 2024-09-12 RX ORDER — ACETAMINOPHEN 325 MG/1
650 TABLET ORAL EVERY 4 HOURS PRN
Status: DISCONTINUED | OUTPATIENT
Start: 2024-09-12 | End: 2024-09-14 | Stop reason: HOSPADM

## 2024-09-12 RX ORDER — AMOXICILLIN 250 MG
2 CAPSULE ORAL 2 TIMES DAILY
Status: DISCONTINUED | OUTPATIENT
Start: 2024-09-12 | End: 2024-09-14 | Stop reason: HOSPADM

## 2024-09-12 RX ORDER — ONDANSETRON 4 MG/1
4 TABLET, ORALLY DISINTEGRATING ORAL EVERY 6 HOURS PRN
Status: DISCONTINUED | OUTPATIENT
Start: 2024-09-12 | End: 2024-09-14 | Stop reason: HOSPADM

## 2024-09-12 RX ORDER — BISACODYL 10 MG
10 SUPPOSITORY, RECTAL RECTAL DAILY PRN
Status: DISCONTINUED | OUTPATIENT
Start: 2024-09-12 | End: 2024-09-14 | Stop reason: HOSPADM

## 2024-09-12 RX ORDER — ERGOCALCIFEROL 1.25 MG/1
50000 CAPSULE, LIQUID FILLED ORAL
Status: DISCONTINUED | OUTPATIENT
Start: 2024-09-14 | End: 2024-09-14 | Stop reason: HOSPADM

## 2024-09-12 RX ORDER — SODIUM CHLORIDE 9 MG/ML
40 INJECTION, SOLUTION INTRAVENOUS AS NEEDED
Status: DISCONTINUED | OUTPATIENT
Start: 2024-09-12 | End: 2024-09-14 | Stop reason: HOSPADM

## 2024-09-12 RX ORDER — DILTIAZEM HCL IN NACL,ISO-OSM 125 MG/125
5-15 PLASTIC BAG, INJECTION (ML) INTRAVENOUS
Status: DISCONTINUED | OUTPATIENT
Start: 2024-09-12 | End: 2024-09-14 | Stop reason: HOSPADM

## 2024-09-12 RX ORDER — POLYETHYLENE GLYCOL 3350 17 G/17G
17 POWDER, FOR SOLUTION ORAL DAILY PRN
Status: DISCONTINUED | OUTPATIENT
Start: 2024-09-12 | End: 2024-09-14 | Stop reason: HOSPADM

## 2024-09-12 RX ORDER — ACETAMINOPHEN 650 MG/1
650 SUPPOSITORY RECTAL EVERY 4 HOURS PRN
Status: DISCONTINUED | OUTPATIENT
Start: 2024-09-12 | End: 2024-09-14 | Stop reason: HOSPADM

## 2024-09-12 RX ORDER — METOPROLOL SUCCINATE 50 MG/1
50 TABLET, EXTENDED RELEASE ORAL DAILY
Status: DISCONTINUED | OUTPATIENT
Start: 2024-09-13 | End: 2024-09-12

## 2024-09-12 RX ORDER — ACETAMINOPHEN 160 MG/5ML
650 SOLUTION ORAL EVERY 4 HOURS PRN
Status: DISCONTINUED | OUTPATIENT
Start: 2024-09-12 | End: 2024-09-14 | Stop reason: HOSPADM

## 2024-09-12 RX ORDER — METOPROLOL SUCCINATE 50 MG/1
50 TABLET, EXTENDED RELEASE ORAL EVERY 12 HOURS SCHEDULED
Status: DISCONTINUED | OUTPATIENT
Start: 2024-09-12 | End: 2024-09-14 | Stop reason: HOSPADM

## 2024-09-12 RX ORDER — SODIUM CHLORIDE 0.9 % (FLUSH) 0.9 %
10 SYRINGE (ML) INJECTION EVERY 12 HOURS SCHEDULED
Status: DISCONTINUED | OUTPATIENT
Start: 2024-09-12 | End: 2024-09-14 | Stop reason: HOSPADM

## 2024-09-12 RX ADMIN — ROSUVASTATIN CALCIUM 10 MG: 5 TABLET, FILM COATED ORAL at 22:02

## 2024-09-12 RX ADMIN — DRONEDARONE 400 MG: 400 TABLET, FILM COATED ORAL at 17:55

## 2024-09-12 RX ADMIN — METOPROLOL SUCCINATE 50 MG: 50 TABLET, EXTENDED RELEASE ORAL at 22:02

## 2024-09-12 RX ADMIN — RIVAROXABAN 20 MG: 20 TABLET, FILM COATED ORAL at 17:55

## 2024-09-12 RX ADMIN — DIGOXIN 250 MCG: 0.25 INJECTION INTRAMUSCULAR; INTRAVENOUS at 15:08

## 2024-09-12 RX ADMIN — DILTIAZEM HYDROCHLORIDE 5 MG/HR: 5 INJECTION INTRAVENOUS at 12:21

## 2024-09-12 NOTE — H&P
[facility] HISTORY AND PHYSICAL  Date: 2024   Patient Name: Malik Abbasi  : 1965  MRN: [id number]  Primary Care Physician:  Valeriano Abdalla PA  Date of admission: 2024    Subjective atrial flutter  Subjective   Chief Complaint: Atrial flutter    HPI:  Patient is a 59-year-old male who presents to the emergency room for evaluation of atrial flutter.  Patient has hypertension, congestive heart failure.  He has been in atrial flutter since Monday.  Dr. Perez Garces cardioverted him but patient remained in atrial flutter.  He does have some shortness of breath when he gets up and moves around.    On arrival to the ED, patient temperature 97.7, pulse of 127, respiratory rate of 20, blood pressure 115/73, and he was saturating 98% on room air.    On labs, patient's TSH is 2.470.  Sodium is 142.  Potassium is 3.8.  White blood cell count is 7.68.  Platelets are 319.    Chest x-ray is normal.  Personal History     Past Medical History:  Past Medical History:   Diagnosis Date    A-fib     Ankle sprain     Arthritis     CHF (congestive heart failure)     CTS (carpal tunnel syndrome)     Elevated cholesterol     GERD (gastroesophageal reflux disease)     Hyperlipidemia     Hypertension     Irregular heartbeat     Knee swelling     Rotator cuff syndrome     Sleep apnea     cpap    Tear of meniscus of knee          Past Surgical History:  Past Surgical History:   Procedure Laterality Date    CARDIOVERSION      CHOLECYSTECTOMY  2006    ENDOSCOPY N/A 2022    Procedure: ESOPHAGOGASTRODUODENOSCOPY WITH BIOPSIES;  Surgeon: Reese Mcintyre MD;  Location: [facility] ENDOSCOPY;  Service: General;  Laterality: N/A;  NORMAL COLON    HAND SURGERY      Did not work    KNEE ARTHROSCOPY Left     ROTATOR CUFF REPAIR Right 2019    SHOULDER SURGERY      TRICEP TENDON REPAIR Right 2019        Family History:   Breast Cancer-related family history is not on file.      Social History:    Social History     Socioeconomic History    Marital status:    Tobacco Use    Smoking status: Former     Current packs/day: 0.00     Types: Cigarettes     Quit date: 2006     Years since quittin.9    Smokeless tobacco: Never   Vaping Use    Vaping status: Never Used   Substance and Sexual Activity    Alcohol use: Yes     Comment: rarely    Drug use: No    Sexual activity: Defer         Home Medications:  allopurinol, amLODIPine, aspirin, dronedarone, indomethacin, metoprolol succinate XL, olmesartan-hydrochlorothiazide, pantoprazole, rivaroxaban, rosuvastatin, and vitamin D    Allergies:  Allergies   Allergen Reactions    Codeine Other (See Comments)     Syncope       Review of Systems   All systems were reviewed and negative except for: sob     Objective   Objective     Vitals:   Temp:  [97.7 °F (36.5 °C)-98.1 °F (36.7 °C)] 98.1 °F (36.7 °C)  Heart Rate:  [] 101  Resp:  [14-22] 14  BP: ()/(46-95) 101/68    Physical Exam   Physical Exam  HENT:      Mouth/Throat:      Mouth: Mucous membranes are moist.   Eyes:      Pupils: Pupils are equal, round, and reactive to light.   Cardiovascular:      Rate and Rhythm: Tachycardia present.   Pulmonary:      Effort: Pulmonary effort is normal.   Abdominal:      General: Abdomen is flat. Bowel sounds are normal.      Palpations: Abdomen is soft.   Musculoskeletal:         General: Normal range of motion.   Skin:     General: Skin is warm.   Neurological:      General: No focal deficit present.      Mental Status: He is alert and oriented to person, place, and time.          Result Review      Result Review:  I have personally reviewed the results from the time of this admission to 2024 16:34 EDT and agree with these findings:  [x]  Laboratory  [x]  Microbiology  [x]  Radiology  [x]  EKG/Telemetry   [x]  Cardiology/Vascular   [x]  Pathology  [x]  Old records  []  Other:    Lab Results (most recent)       Procedure Component Value Units  Date/Time    Single High Sensitivity Troponin T [[id number]]  (Normal) Collected: 09/12/24 1047    Specimen: Blood from Arm, Right Updated: 09/12/24 1139     HS Troponin T 14 ng/L     Narrative:      High Sensitive Troponin T Reference Range:  <14.0 ng/L- Negative Female for AMI  <22.0 ng/L- Negative Male for AMI  >=14 - Abnormal Female indicating possible myocardial injury.  >=22 - Abnormal Male indicating possible myocardial injury.   Clinicians would have to utilize clinical acumen, EKG, Troponin, and serial changes to determine if it is an Acute Myocardial Infarction or myocardial injury due to an underlying chronic condition.         TSH [[id number]]  (Normal) Collected: 09/12/24 1047    Specimen: Blood from Arm, Right Updated: 09/12/24 1139     TSH 2.470 uIU/mL     Comprehensive Metabolic Panel [[id number]]  (Abnormal) Collected: 09/12/24 1047    Specimen: Blood from Arm, Right Updated: 09/12/24 1135     Glucose 102 mg/dL      BUN 20 mg/dL      Creatinine 1.21 mg/dL      Sodium 142 mmol/L      Potassium 3.8 mmol/L      Chloride 105 mmol/L      CO2 25.0 mmol/L      Calcium 9.1 mg/dL      Total Protein 7.7 g/dL      Albumin 3.8 g/dL      ALT (SGPT) 36 U/L      AST (SGOT) 27 U/L      Alkaline Phosphatase 83 U/L      Total Bilirubin 0.7 mg/dL      Globulin 3.9 gm/dL      A/G Ratio 1.0 g/dL      BUN/Creatinine Ratio 16.5     Anion Gap 12.0 mmol/L      eGFR 68.1 mL/min/1.73     Narrative:      GFR Normal >60  Chronic Kidney Disease <60  Kidney Failure <15      Magnesium [[id number]]  (Normal) Collected: 09/12/24 1047    Specimen: Blood from Arm, Right Updated: 09/12/24 1135     Magnesium 2.0 mg/dL     [place] Draw [[id number]] Collected: 09/12/24 1047    Specimen: Blood from Arm, Right Updated: 09/12/24 1115    Narrative:      The following orders were created for panel order [place] Draw.  Procedure                               Abnormality         Status                     ---------                                -----------         ------                     Green Top (Gel)[[id number]]                                  Final result               Lavender Top[[id number]]                                     Final result               Gold Top - SST[[id number]]                                   Final result               Light Blue Top[[id number]]                                   Final result                 Please view results for these tests on the individual orders.    Green Top (Gel) [[id number]] Collected: 09/12/24 1047    Specimen: Blood from Arm, Right Updated: 09/12/24 1115     Extra Tube Hold for add-ons.     Comment: Auto resulted.       Light Blue Top [[id number]] Collected: 09/12/24 1047    Specimen: Blood from Arm, Right Updated: 09/12/24 1115     Extra Tube Hold for add-ons.     Comment: Auto resulted       Lavender Top [[id number]] Collected: 09/12/24 1047    Specimen: Blood from Arm, Right Updated: 09/12/24 1115     Extra Tube hold for add-on     Comment: Auto resulted       Gold Top - SST [[id number]] Collected: 09/12/24 1047    Specimen: Blood from Arm, Right Updated: 09/12/24 1115     Extra Tube Hold for add-ons.     Comment: Auto resulted.       CBC & Differential [[id number]]  (Normal) Collected: 09/12/24 1047    Specimen: Blood from Arm, Right Updated: 09/12/24 1109    Narrative:      The following orders were created for panel order CBC & Differential.  Procedure                               Abnormality         Status                     ---------                               -----------         ------                     CBC Auto Differential[[id number]]        Normal              Final result                 Please view results for these tests on the individual orders.    CBC Auto Differential [[id number]]  (Normal) Collected: 09/12/24 1047    Specimen: Blood from Arm, Right Updated: 09/12/24 1109     WBC 7.68 10*3/mm3      RBC 5.48 10*6/mm3      Hemoglobin 16.6 g/dL      Hematocrit 48.1 %      MCV 87.8 fL       MCH 30.3 pg      MCHC 34.5 g/dL      RDW 13.1 %      RDW-SD 41.9 fl      MPV 9.8 fL      Platelets 319 10*3/mm3      Neutrophil % 62.4 %      Lymphocyte % 25.7 %      Monocyte % 6.3 %      Eosinophil % 4.2 %      Basophil % 1.0 %      Immature Grans % 0.4 %      Neutrophils, Absolute 4.80 10*3/mm3      Lymphocytes, Absolute 1.97 10*3/mm3      Monocytes, Absolute 0.48 10*3/mm3      Eosinophils, Absolute 0.32 10*3/mm3      Basophils, Absolute 0.08 10*3/mm3      Immature Grans, Absolute 0.03 10*3/mm3      nRBC 0.0 /100 WBC             XR Chest 1 View    Result Date: 9/12/2024  Impression: No active disease Electronically Signed: Cornelio Irwin MD  9/12/2024 11:34 AM EDT  Workstation ID: [id number]     Assessment & Plan   Assessment / Plan     Assessment/Plan:   #1 atrial flutter, continue beta-blocker, Multaq, digoxin.  Prior to wean off Cardizem.  Continue DOAC.  Dr. Perez Garces consulted.    #2 hypertension continue beta-blocker.  Hold amlodipine, Benicar.  Patient's blood pressure is on the softer side.    #3 GERD continue PPI    #4 CAD continue aspirin and Crestor    #5 gout continue allopurinol        VTE Prophylaxis:  Pharmacologic VTE prophylaxis orders are present.        CODE STATUS:    Level Of Support Discussed With: Patient  Code Status (Patient has no pulse and is not breathing): CPR (Attempt to Resuscitate)  Medical Interventions (Patient has pulse or is breathing): Full Support      Admission Status:  I believe this patient meets observation status.    Electronically signed by Garfield Nagel DO, 09/12/24, 4:28 PM EDT.

## 2024-09-12 NOTE — ED PROVIDER NOTES
Time: 11:50 AM EDT  Date of encounter:  9/12/2024  Independent Historian/Clinical History and Information was obtained by:   Patient    History is limited by: N/A    Chief Complaint: A flutter      History of Present Illness:  Patient is a 59 y.o. year old male who presents to the emergency department for evaluation of atrial flutter.  Patient has a history of A-fib/a flutter, hypertension, CHF.  States he was seen at his doctors yesterday and has been in a flutter since Monday.  His cardiologist gave him medicine to try to convert him but he continues to be in a flutter.  Does report some shortness of breath when he gets up and moves around.  Has been taking his Xarelto.  No other complaints this time.      Patient Care Team  Primary Care Provider: Valeriano Abdalla PA    Past Medical History:     Allergies   Allergen Reactions    Codeine Other (See Comments)     Syncope     Past Medical History:   Diagnosis Date    A-fib     Ankle sprain     Arthritis     CHF (congestive heart failure)     CTS (carpal tunnel syndrome)     GERD (gastroesophageal reflux disease)     Hyperlipidemia     Hypertension     Irregular heartbeat     Knee swelling     Rotator cuff syndrome     Sleep apnea     cpap    Tear of meniscus of knee      Past Surgical History:   Procedure Laterality Date    CARDIOVERSION  2013    CHOLECYSTECTOMY  2006    ENDOSCOPY N/A 11/21/2022    Procedure: ESOPHAGOGASTRODUODENOSCOPY WITH BIOPSIES;  Surgeon: Reese Mcintyre MD;  Location: [facility] ENDOSCOPY;  Service: General;  Laterality: N/A;  NORMAL COLON    HAND SURGERY  2022    Did not work    KNEE ARTHROSCOPY Left     ROTATOR CUFF REPAIR Right 2019    SHOULDER SURGERY      TRICEP TENDON REPAIR Right 2019     Family History   Problem Relation Age of Onset    Heart disease Maternal Grandfather     Malig Hyperthermia Neg Hx        Home Medications:  Prior to Admission medications    Medication Sig Start Date End Date Taking? Authorizing Provider  "  allopurinol (ZYLOPRIM) 300 MG tablet Take 1 tablet by mouth Daily.    Rona Harrison MD   amLODIPine (NORVASC) 5 MG tablet Take 1 tablet by mouth 2 (Two) Times a Day.    Rona Harrison MD   aspirin 81 MG EC tablet Take 1 tablet by mouth Daily.    Rona Harrison MD   BENICAALEX HCT 40-25 MG per tablet Take 1 tablet by mouth Daily. 16   Rona Harrison MD   indomethacin (INDOCIN) 50 MG capsule Take 1 capsule by mouth As Needed. 3/8/22   Rona Harrison MD   metoprolol succinate XL (TOPROL-XL) 50 MG 24 hr tablet Take 1 tablet by mouth Daily. 16   Rona Harrison MD   MULTAQ 400 MG tablet Take 1 tablet by mouth 2 (Two) Times a Day With Meals. 16   Rona Harrison MD   pantoprazole (PROTONIX) 40 MG EC tablet Take 1 tablet by mouth Daily. 22   Rona Harrison MD   rosuvastatin (CRESTOR) 10 MG tablet Take 1 tablet by mouth Every Night. 24   Rona Harrison MD   XARELTO 20 MG tablet Take 1 tablet by mouth Daily With Dinner. 16   Rona Harrison MD        Social History:   Social History     Tobacco Use    Smoking status: Former     Current packs/day: 0.00     Types: Cigarettes     Quit date: 2006     Years since quittin.9    Smokeless tobacco: Never   Vaping Use    Vaping status: Never Used   Substance Use Topics    Alcohol use: Yes     Comment: rarely    Drug use: No         Review of Systems:  Review of Systems   Cardiovascular:  Positive for palpitations.        Physical Exam:  /91   Pulse (!) 126   Temp 98.1 °F (36.7 °C) (Oral)   Resp 22   Ht 182.9 cm (72\")   Wt 132 kg (290 lb 2 oz)   SpO2 95%   BMI 39.35 kg/m²     Physical Exam  Vitals and nursing note reviewed.   Constitutional:       Appearance: Normal appearance.   HENT:      Head: Normocephalic and atraumatic.   Eyes:      General: No scleral icterus.  Cardiovascular:      Rate and Rhythm: Regular rhythm. Tachycardia present.      Heart sounds: Normal " heart sounds.   Pulmonary:      Effort: Pulmonary effort is normal.      Breath sounds: Normal breath sounds.   Abdominal:      Palpations: Abdomen is soft.      Tenderness: There is no abdominal tenderness.   Musculoskeletal:         General: Normal range of motion.      Cervical back: Normal range of motion.   Skin:     Findings: No rash.   Neurological:      General: No focal deficit present.      Mental Status: He is alert.                  Procedures:  Procedures      Medical Decision Making:      Comorbidities that affect care:    Atrial Fibrillation, Hypertension    External Notes reviewed:    Reviewed note from 7/25/2020      The following orders were placed and all results were independently analyzed by me:  Orders Placed This Encounter   Procedures    XR Chest 1 View    [place] Draw    Comprehensive Metabolic Panel    Magnesium    Single High Sensitivity Troponin T    TSH    CBC Auto Differential    NPO Diet NPO Type: Strict NPO    Undress & Gown    Continuous Pulse Oximetry    Inpatient Cardiology Consult    Inpatient Hospitalist Consult    Oxygen Therapy- Nasal Cannula; Titrate 1-6 LPM Per SpO2; 90 - 95%    ECG 12 Lead ED Triage Standing Order; Dysrhythmia    Insert Peripheral IV    Inpatient Admission    CBC & Differential    Green Top (Gel)    Lavender Top    Gold Top - SST    Light Blue Top       Medications Given in the Emergency Department:  Medications   sodium chloride 0.9 % flush 10 mL (has no administration in time range)   dilTIAZem (CARDIZEM) 125 mg in 125 mL sodium chloride  infusion (10 mg/hr Intravenous Rate/Dose Change 9/12/24 1334)   dilTIAZem (CARDIZEM) bolus from bag 1 mg/mL solution 10 mg (10 mg Intravenous Bolus from Bag 9/12/24 1220)        ED Course:    ED Course as of 09/12/24 1349   Thu Sep 12, 2024   1149 EKG interpreted by me  Time: 1040  Heart rate 124  A-flutter/A-fib, nonspecific IVCD [MA]   1220 Spoke with Dr. Perez Garces who will consult [MA]      ED Course User Index  [MA]  Cesar De La Cruz MD       Labs:    Lab Results (last 24 hours)       Procedure Component Value Units Date/Time    CBC & Differential [[id number]]  (Normal) Collected: 09/12/24 1047    Specimen: Blood from Arm, Right Updated: 09/12/24 1109    Narrative:      The following orders were created for panel order CBC & Differential.  Procedure                               Abnormality         Status                     ---------                               -----------         ------                     CBC Auto Differential[[id number]]        Normal              Final result                 Please view results for these tests on the individual orders.    Comprehensive Metabolic Panel [[id number]]  (Abnormal) Collected: 09/12/24 1047    Specimen: Blood from Arm, Right Updated: 09/12/24 1135     Glucose 102 mg/dL      BUN 20 mg/dL      Creatinine 1.21 mg/dL      Sodium 142 mmol/L      Potassium 3.8 mmol/L      Chloride 105 mmol/L      CO2 25.0 mmol/L      Calcium 9.1 mg/dL      Total Protein 7.7 g/dL      Albumin 3.8 g/dL      ALT (SGPT) 36 U/L      AST (SGOT) 27 U/L      Alkaline Phosphatase 83 U/L      Total Bilirubin 0.7 mg/dL      Globulin 3.9 gm/dL      A/G Ratio 1.0 g/dL      BUN/Creatinine Ratio 16.5     Anion Gap 12.0 mmol/L      eGFR 68.1 mL/min/1.73     Narrative:      GFR Normal >60  Chronic Kidney Disease <60  Kidney Failure <15      Magnesium [[id number]]  (Normal) Collected: 09/12/24 1047    Specimen: Blood from Arm, Right Updated: 09/12/24 1135     Magnesium 2.0 mg/dL     Single High Sensitivity Troponin T [[id number]]  (Normal) Collected: 09/12/24 1047    Specimen: Blood from Arm, Right Updated: 09/12/24 1139     HS Troponin T 14 ng/L     Narrative:      High Sensitive Troponin T Reference Range:  <14.0 ng/L- Negative Female for AMI  <22.0 ng/L- Negative Male for AMI  >=14 - Abnormal Female indicating possible myocardial injury.  >=22 - Abnormal Male indicating possible myocardial injury.   Clinicians would  have to utilize clinical acumen, EKG, Troponin, and serial changes to determine if it is an Acute Myocardial Infarction or myocardial injury due to an underlying chronic condition.         TSH [[id number]]  (Normal) Collected: 09/12/24 1047    Specimen: Blood from Arm, Right Updated: 09/12/24 1139     TSH 2.470 uIU/mL     CBC Auto Differential [[id number]]  (Normal) Collected: 09/12/24 1047    Specimen: Blood from Arm, Right Updated: 09/12/24 1109     WBC 7.68 10*3/mm3      RBC 5.48 10*6/mm3      Hemoglobin 16.6 g/dL      Hematocrit 48.1 %      MCV 87.8 fL      MCH 30.3 pg      MCHC 34.5 g/dL      RDW 13.1 %      RDW-SD 41.9 fl      MPV 9.8 fL      Platelets 319 10*3/mm3      Neutrophil % 62.4 %      Lymphocyte % 25.7 %      Monocyte % 6.3 %      Eosinophil % 4.2 %      Basophil % 1.0 %      Immature Grans % 0.4 %      Neutrophils, Absolute 4.80 10*3/mm3      Lymphocytes, Absolute 1.97 10*3/mm3      Monocytes, Absolute 0.48 10*3/mm3      Eosinophils, Absolute 0.32 10*3/mm3      Basophils, Absolute 0.08 10*3/mm3      Immature Grans, Absolute 0.03 10*3/mm3      nRBC 0.0 /100 WBC              Imaging:    XR Chest 1 View    Result Date: 9/12/2024  XR CHEST 1 VW Date of Exam: 9/12/2024 11:23 AM EDT Indication: Dysrhythmia Triage Protocol Comparison: 7/25/2024 Findings: Heart size is normal for the projection. The pulmonary vascular pattern is normal and the lungs are clear.     Impression: No active disease Electronically Signed: Cornelio Irwin MD  9/12/2024 11:34 AM EDT  Workstation ID: [id number]       Differential Diagnosis and Discussion:    Palpitations: Differential diagnosis includes but is not limited to anxiety, atrioventricular blocks, mitral valve disease, hypoxia, coronary artery disease, hypokalemia, anemia, fever, COPD, congestive heart failure, pericarditis, Carli-Parkinson-White syndrome, pulmonary embolism, SVT, atrial fibrillation, atrial flutter, sinus tachycardia, thyrotoxicosis, and  pheochromocytoma.    All labs were reviewed and interpreted by me.  All X-rays impressions were independently interpreted by me.  EKG was interpreted by me.    MDM     Patient is a 59-year-old gentleman who presents with complaints of a flutter.  Has been in it for several days now.  Was seen at cardiology clinic yesterday for the same.  Was not able to get converted with metoprolol at home.  Presents back with continued elevated heart rate in the 120s.  Had to be started on Cardizem drip.  Will need to be admitted to the hospital for further workup management.      Total Critical Care time of 33 minutes. Total critical care time documented does not include time spent on separately billed procedures for services of nurses or physician assistants. I personally saw and examined the patient. I have reviewed all diagnostic interpretations and treatment plans as written. I was present for the key portions of any procedures performed and the inclusive time noted in any critical care statement. Critical care time includes patient management by me, time spent at the patients bedside,  time to review lab and imaging results, discussing patient care, documentation in the medical record, and time spent with family or caregiver.          Patient Care Considerations:          Consultants/Shared Management Plan:    Hospitalist: I have discussed the case with Dr. Nagel who agrees to accept the patient for admission.  Consultant: I have discussed the case with Dr. Perez Garces who agrees to consult on the patient.    Social Determinants of Health:          Disposition and Care Coordination:    Admit:   Through independent evaluation of the patient's history, physical, and imperical data, the patient meets criteria for inpatient admission to the hospital.        Final diagnoses:   Atrial flutter with rapid ventricular response        ED Disposition       ED Disposition   Decision to Admit    Condition   --    Comment   Level of Care:  Telemetry [5]   Diagnosis: Atrial flutter [427.32.ICD-9-CM]   Admitting Physician: KIRA RODAS [[id number]]   Attending Physician: KIRA RODAS [[id number]]   Certification: I Certify That Inpatient Hospital Services Are Medically Necessary For Greater Than 2 Midnights                 This medical record created using voice recognition software.             Cesar De La Cruz MD  09/12/24 1614

## 2024-09-12 NOTE — Clinical Note
Level of Care: Telemetry [5]   Diagnosis: Atrial flutter [427.32.ICD-9-CM]   Admitting Physician: KIRA RODAS [[id number]]   Attending Physician: KIRA RODAS [[id number]]   Certification: I Certify That Inpatient Hospital Services Are Medically Necessary For Greater Than 2 Midnights

## 2024-09-12 NOTE — CONSULTS
Date of consultation: 9/12/2024    Reason for consultation: Atrial flutter-fibrillation.    HPI: 59-year-old male with history of atrial fibs flutter, status post DC cardioversion in 2017 and July 2024.  He has been on Multaq and Xarelto.  He initially came to the office with palpitations.  ECG showed atrial flutter with RVR (ventricular rate was 125 bpm).  He preferred to be treated as outpatient.  Therefor, the beta-blocker dose was increased and he was given further instructions.  However, he continued to have palpitations at home and he came to the ER for further management.  He had some dyspnea but no chest pain, syncope or near syncope.  He was out of the [place] and he drank some alcohol in his vacation.  He was previously given instruction to avoid alcohol intake.  In the ER, he was given digoxin 0.25 mg IV x 1 as well as Cardizem bolus 10 mg IV x 1.  Then, he was started on IV Cardizem drip.    Review of systems: Negative for headache, tinnitus, vertigo, nausea, vomiting, abdominal pain, dysuria, hematuria, hemoptysis, TIA or CVA-like symptoms     Past medical and surgical history:     1.  Recent upper respiratory infection.  2.  Remote acute diastolic CHF due to cardiac arrhythmia  3.  Hypertension  4.  Hypercholesterolemia  5.  Degenerative joint disease  6.  GERD  7.  Surgeries-procedures: EGD, cholecystectomy, left knee arthroscopy, right triceps tendon repair and right rotator cuff repair.     Medications: See the patient's medication list     Allergies: Codeine     Social history: Non-smoker.  No illicit drug use.  No alcohol for many years.  Used to drink a lot in the past     Family history: Noncontributory.     Physical examination: He was in no pain or respiratory distress  Vital signs: Reviewed  HEENT: Sclerae: Nonicteric.  EOMI  Neck: No JVD or carotid bruit.  No thyromegaly  Chest: CTA  Cardiac: Tachycardic.  RRR.  No murmurs or S3 gallop  Abdomen: Morbidly obese, soft and nontender.  No apparent  megalies or masses  Extremities: No edema, cyanosis or clubbing.  Pulses: Intact.  Neuro: Alert, oriented x 3, no facial droop and speech was clear.     Records: Reviewed.    Assessment and plan:    1.  Recurrent atrial flutter, status post remote DC cardioversion in 2017 and July 2024.  2.  Oral anticoagulation with Xarelto  3.  Other medical problems as stated above  4.  DC cardioversion in the a.m.  5.  Consider change Multaq to Rythmol.

## 2024-09-12 NOTE — CASE MANAGEMENT/SOCIAL WORK
Discharge Planning Assessment   Kunal     Patient Name: Malik Abbasi  MRN: [id number]  Today's Date: 9/12/2024    Admit Date: 9/12/2024        Discharge Needs Assessment       Row Name 09/12/24 1415       Discharge Needs Assessment    Equipment Currently Used at Home cpap (P)       Row Name 09/12/24 1411       Living Environment    People in Home spouse (P)     Name(s) of People in Home Wife- Darling (P)     Current Living Arrangements home (P)     Potentially Unsafe Housing Conditions none (P)     In the past 12 months has the electric, gas, oil, or water company threatened to shut off services in your home? No (P)     Primary Care Provided by self (P)     Provides Primary Care For no one (P)     Quality of Family Relationships helpful;involved;supportive (P)     Able to Return to Prior Arrangements yes (P)        Resource/Environmental Concerns    Resource/Environmental Concerns none (P)     Transportation Concerns none (P)        Transportation Needs    In the past 12 months, has lack of transportation kept you from medical appointments or from getting medications? no (P)     In the past 12 months, has lack of transportation kept you from meetings, work, or from getting things needed for daily living? No (P)        Food Insecurity    Within the past 12 months, you worried that your food would run out before you got the money to buy more. Never true (P)     Within the past 12 months, the food you bought just didn't last and you didn't have money to get more. Never true (P)        Transition Planning    Patient/Family Anticipates Transition to home (P)     Patient/Family Anticipated Services at Transition none (P)     Transportation Anticipated family or friend will provide (P)        Discharge Needs Assessment    Readmission Within the Last 30 Days no previous admission in last 30 days (P)     Equipment Currently Used at Home none (P)     Concerns to be Addressed no discharge needs identified (P)      Anticipated Changes Related to Illness none (P)     Equipment Needed After Discharge none (P)                    Discharge Plan    No documentation.                 Continued Care and Services - Admitted Since 9/12/2024    No active coordination exists for this encounter.          Demographic Summary       Row Name 09/12/24 1409       General Information    Preferred Language English (P)                    Functional Status       Row Name 09/12/24 1410       Physical Activity    On average, how many days per week do you engage in moderate to strenuous exercise (like a brisk walk)? 3 days (P)     On average, how many minutes do you engage in exercise at this level? 60 min (P)     Number of minutes of exercise per week 180 (P)        Functional Status, IADL    Medications independent (P)     Meal Preparation independent (P)     Housekeeping independent (P)     Laundry independent (P)     Shopping independent (P)        Employment/    Employment Status retired (P)                    Psychosocial       Row Name 09/12/24 1411       Mental Health    Little Interest or Pleasure in Doing Things 0-->not at all (P)     Feeling Down, Depressed or Hopeless 0-->not at all (P)        Stress    Do you feel stress - tense, restless, nervous, or anxious, or unable to sleep at night because your mind is troubled all the time - these days? Not at all (P)        Developmental Stage (Eriksson's)    Developmental Stage Stage 7 (35-65 years/Middle Adulthood) Generativity vs. Stagnation (P)                    Abuse/Neglect       Row Name 09/12/24 1411       Personal Safety    Feels Unsafe at Home or Work/School no (P)     Feels Threatened by Someone no (P)     Does Anyone Try to Keep You From Having Contact with Others or Doing Things Outside Your Home? no (P)     Physical Signs of Abuse Present no (P)                    Legal       Row Name 09/12/24 1413       Financial Resource Strain    How hard is it for you to pay for the very  basics like food, housing, medical care, and heating? Not hard (P)                    Substance Abuse    No documentation.                  Patient Forms    No documentation.                 SW met with pt. Pt stated that he lives at home with his wife Darling and there dog. 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 Pt does currently use cpap machine at home. Pt did not state any current needs at this time.    Nena Muhammad, Social Work Student

## 2024-09-13 ENCOUNTER — APPOINTMENT (OUTPATIENT)
Dept: CARDIOLOGY | Facility: HOSPITAL | Age: 59
End: 2024-09-13
Payer: OTHER GOVERNMENT

## 2024-09-13 LAB
ANION GAP SERPL CALCULATED.3IONS-SCNC: 9.2 MMOL/L (ref 5–15)
BASOPHILS # BLD AUTO: 0.07 10*3/MM3 (ref 0–0.2)
BASOPHILS NFR BLD AUTO: 0.9 % (ref 0–1.5)
BUN SERPL-MCNC: 21 MG/DL (ref 8–23)
BUN/CREAT SERPL: 17.2 (ref 7–25)
CALCIUM SPEC-SCNC: 8.9 MG/DL (ref 8.6–10.5)
CHLORIDE SERPL-SCNC: 103 MMOL/L (ref 98–107)
CO2 SERPL-SCNC: 27.8 MMOL/L (ref 22–29)
CREAT SERPL-MCNC: 1.22 MG/DL (ref 0.76–1.27)
DEPRECATED RDW RBC AUTO: 42.9 FL (ref 37–54)
EGFRCR SERPLBLD CKD-EPI 2021: 67.5 ML/MIN/1.73
EOSINOPHIL # BLD AUTO: 0.31 10*3/MM3 (ref 0–0.4)
EOSINOPHIL NFR BLD AUTO: 3.9 % (ref 0.3–6.2)
ERYTHROCYTE [DISTWIDTH] IN BLOOD BY AUTOMATED COUNT: 13 % (ref 12.3–15.4)
GLUCOSE SERPL-MCNC: 96 MG/DL (ref 65–99)
HCT VFR BLD AUTO: 45.3 % (ref 37.5–51)
HGB BLD-MCNC: 15 G/DL (ref 13–17.7)
IMM GRANULOCYTES # BLD AUTO: 0.07 10*3/MM3 (ref 0–0.05)
IMM GRANULOCYTES NFR BLD AUTO: 0.9 % (ref 0–0.5)
LYMPHOCYTES # BLD AUTO: 2.53 10*3/MM3 (ref 0.7–3.1)
LYMPHOCYTES NFR BLD AUTO: 32 % (ref 19.6–45.3)
MAGNESIUM SERPL-MCNC: 1.9 MG/DL (ref 1.6–2.4)
MCH RBC QN AUTO: 29.9 PG (ref 26.6–33)
MCHC RBC AUTO-ENTMCNC: 33.1 G/DL (ref 31.5–35.7)
MCV RBC AUTO: 90.2 FL (ref 79–97)
MONOCYTES # BLD AUTO: 0.45 10*3/MM3 (ref 0.1–0.9)
MONOCYTES NFR BLD AUTO: 5.7 % (ref 5–12)
NEUTROPHILS NFR BLD AUTO: 4.48 10*3/MM3 (ref 1.7–7)
NEUTROPHILS NFR BLD AUTO: 56.6 % (ref 42.7–76)
NRBC BLD AUTO-RTO: 0 /100 WBC (ref 0–0.2)
NT-PROBNP SERPL-MCNC: 377.7 PG/ML (ref 0–900)
PHOSPHATE SERPL-MCNC: 3.6 MG/DL (ref 2.5–4.5)
PLATELET # BLD AUTO: 267 10*3/MM3 (ref 140–450)
PMV BLD AUTO: 10.2 FL (ref 6–12)
POTASSIUM SERPL-SCNC: 3.6 MMOL/L (ref 3.5–5.2)
RBC # BLD AUTO: 5.02 10*6/MM3 (ref 4.14–5.8)
SODIUM SERPL-SCNC: 140 MMOL/L (ref 136–145)
WBC NRBC COR # BLD AUTO: 7.91 10*3/MM3 (ref 3.4–10.8)

## 2024-09-13 PROCEDURE — 96375 TX/PRO/DX INJ NEW DRUG ADDON: CPT

## 2024-09-13 PROCEDURE — 25010000002 MEPERIDINE PER 100 MG: Performed by: SPECIALIST

## 2024-09-13 PROCEDURE — 96376 TX/PRO/DX INJ SAME DRUG ADON: CPT

## 2024-09-13 PROCEDURE — G0378 HOSPITAL OBSERVATION PER HR: HCPCS

## 2024-09-13 PROCEDURE — 80048 BASIC METABOLIC PNL TOTAL CA: CPT | Performed by: HOSPITALIST

## 2024-09-13 PROCEDURE — 84100 ASSAY OF PHOSPHORUS: CPT | Performed by: HOSPITALIST

## 2024-09-13 PROCEDURE — 25010000002 MIDAZOLAM PER 1MG: Performed by: SPECIALIST

## 2024-09-13 PROCEDURE — 96366 THER/PROPH/DIAG IV INF ADDON: CPT

## 2024-09-13 PROCEDURE — 83880 ASSAY OF NATRIURETIC PEPTIDE: CPT | Performed by: INTERNAL MEDICINE

## 2024-09-13 PROCEDURE — 99233 SBSQ HOSP IP/OBS HIGH 50: CPT | Performed by: INTERNAL MEDICINE

## 2024-09-13 PROCEDURE — 83735 ASSAY OF MAGNESIUM: CPT | Performed by: HOSPITALIST

## 2024-09-13 PROCEDURE — 92960 CARDIOVERSION ELECTRIC EXT: CPT

## 2024-09-13 PROCEDURE — 85025 COMPLETE CBC W/AUTO DIFF WBC: CPT | Performed by: HOSPITALIST

## 2024-09-13 RX ORDER — PROPAFENONE HYDROCHLORIDE 150 MG/1
150 TABLET, COATED ORAL EVERY 8 HOURS SCHEDULED
Status: DISCONTINUED | OUTPATIENT
Start: 2024-09-14 | End: 2024-09-14 | Stop reason: HOSPADM

## 2024-09-13 RX ORDER — POTASSIUM CHLORIDE 750 MG/1
40 CAPSULE, EXTENDED RELEASE ORAL ONCE
Status: COMPLETED | OUTPATIENT
Start: 2024-09-13 | End: 2024-09-13

## 2024-09-13 RX ORDER — PROPAFENONE HYDROCHLORIDE 150 MG/1
150 TABLET, COATED ORAL EVERY 8 HOURS SCHEDULED
Status: DISCONTINUED | OUTPATIENT
Start: 2024-09-13 | End: 2024-09-13

## 2024-09-13 RX ORDER — MEPERIDINE HYDROCHLORIDE 25 MG/ML
INJECTION INTRAMUSCULAR; INTRAVENOUS; SUBCUTANEOUS
Status: COMPLETED | OUTPATIENT
Start: 2024-09-13 | End: 2024-09-13

## 2024-09-13 RX ORDER — MIDAZOLAM HYDROCHLORIDE 2 MG/2ML
INJECTION, SOLUTION INTRAMUSCULAR; INTRAVENOUS
Status: COMPLETED | OUTPATIENT
Start: 2024-09-13 | End: 2024-09-13

## 2024-09-13 RX ADMIN — ROSUVASTATIN CALCIUM 10 MG: 5 TABLET, FILM COATED ORAL at 20:49

## 2024-09-13 RX ADMIN — RIVAROXABAN 20 MG: 20 TABLET, FILM COATED ORAL at 17:40

## 2024-09-13 RX ADMIN — METOPROLOL TARTRATE 2.5 MG: 1 INJECTION, SOLUTION INTRAVENOUS at 07:25

## 2024-09-13 RX ADMIN — MIDAZOLAM HYDROCHLORIDE 2 MG: 1 INJECTION, SOLUTION INTRAMUSCULAR; INTRAVENOUS at 09:27

## 2024-09-13 RX ADMIN — ALLOPURINOL 300 MG: 300 TABLET ORAL at 08:06

## 2024-09-13 RX ADMIN — MIDAZOLAM HYDROCHLORIDE 2 MG: 1 INJECTION, SOLUTION INTRAMUSCULAR; INTRAVENOUS at 09:36

## 2024-09-13 RX ADMIN — DRONEDARONE 400 MG: 400 TABLET, FILM COATED ORAL at 08:12

## 2024-09-13 RX ADMIN — METOPROLOL TARTRATE 2.5 MG: 1 INJECTION, SOLUTION INTRAVENOUS at 03:07

## 2024-09-13 RX ADMIN — DILTIAZEM HYDROCHLORIDE 5 MG/HR: 5 INJECTION INTRAVENOUS at 00:15

## 2024-09-13 RX ADMIN — PANTOPRAZOLE SODIUM 40 MG: 40 TABLET, DELAYED RELEASE ORAL at 08:06

## 2024-09-13 RX ADMIN — MEPERIDINE HYDROCHLORIDE 25 MG: 25 INJECTION INTRAMUSCULAR; INTRAVENOUS; SUBCUTANEOUS at 09:17

## 2024-09-13 RX ADMIN — MIDAZOLAM HYDROCHLORIDE 2 MG: 1 INJECTION, SOLUTION INTRAMUSCULAR; INTRAVENOUS at 09:35

## 2024-09-13 RX ADMIN — MIDAZOLAM HYDROCHLORIDE 2 MG: 1 INJECTION, SOLUTION INTRAMUSCULAR; INTRAVENOUS at 09:32

## 2024-09-13 RX ADMIN — METOPROLOL SUCCINATE 50 MG: 50 TABLET, EXTENDED RELEASE ORAL at 08:12

## 2024-09-13 RX ADMIN — POTASSIUM CHLORIDE 40 MEQ: 750 CAPSULE, EXTENDED RELEASE ORAL at 08:20

## 2024-09-13 RX ADMIN — METOPROLOL SUCCINATE 50 MG: 50 TABLET, EXTENDED RELEASE ORAL at 20:49

## 2024-09-13 RX ADMIN — ASPIRIN 81 MG: 81 TABLET, COATED ORAL at 08:06

## 2024-09-13 RX ADMIN — Medication 400 MG: at 08:20

## 2024-09-13 RX ADMIN — Medication 10 ML: at 08:06

## 2024-09-13 NOTE — PLAN OF CARE
Goal Outcome Evaluation:  Plan of Care Reviewed With: patient        Progress: improving  Outcome Evaluation: Patient given metorpolol IV push x1 during beginning of shift. Patient Cardioverted in cath lab one shock delivered. Patient heart rate remains 60-70s post cardioversion. Rythmol to be started tomorrow. Pharmacy changed to [facility] due to [facility] being closed on weekends. No complaints at this time. Call light in reach.

## 2024-09-13 NOTE — SIGNIFICANT NOTE
09/13/24 1000   Physical Therapy Time and Intention   Session Not Performed patient unavailable for treatment   Comment, Session Not Performed Pt in cath lab, unavailable

## 2024-09-13 NOTE — PLAN OF CARE
Goal Outcome Evaluation:  Plan of Care Reviewed With: patient        Progress: improving  Outcome Evaluation: A&Ox4.   Cardizem drip held.  One dose of PRN Metoprolol 2.5 mg IV given.  B/Ps and HR within parameters at the time of this note.  No complaints of pain voiced.  No acute distress noted.  Wore CPAP all night and appeared to have uninterrupted sleep.  Call bell within reach at all times for needs and safety.

## 2024-09-13 NOTE — CASE MANAGEMENT/SOCIAL WORK
Discharge Planning Assessment   Kunal     Patient Name: Malik Abbasi  MRN: [id number]  Today's Date: 9/13/2024    Admit Date: 9/12/2024    Plan: Pt lives at home with wife. Pt has good support. PCP: AKBAR Abdalla, [facility]. Pt denies fin. stressors at this time. Pt has CPAP, orders supplies from [facility] (Online)/ Pt has no concerns with transportation . Pt plans to return home at discharge. SW will continue to follow for needs   Discharge Needs Assessment       Row Name 09/13/24 0925       Living Environment    People in Home spouse    Current Living Arrangements home    Potentially Unsafe Housing Conditions none    Primary Care Provided by self    Provides Primary Care For no one    Family Caregiver if Needed none    Quality of Family Relationships helpful;involved    Able to Return to Prior Arrangements yes       Resource/Environmental Concerns    Resource/Environmental Concerns none    Transportation Concerns none       Transportation Needs    In the past 12 months, has lack of transportation kept you from medical appointments or from getting medications? no    In the past 12 months, has lack of transportation kept you from meetings, work, or from getting things needed for daily living? No       Food Insecurity    Within the past 12 months, you worried that your food would run out before you got the money to buy more. Never true    Within the past 12 months, the food you bought just didn't last and you didn't have money to get more. Never true       Transition Planning    Patient/Family Anticipates Transition to home with family    Patient/Family Anticipated Services at Transition none       Discharge Needs Assessment    Readmission Within the Last 30 Days no previous admission in last 30 days    Equipment Currently Used at Home cpap;bp cuff    Concerns to be Addressed discharge planning    Anticipated Changes Related to Illness none    Equipment Needed After Discharge none     Discharge Coordination/Progress Pt lives at home with wife. Pt has good support. PCP: AKBAR Abdalla [facility]. Pt denies fin. stressors at this time. Pt has CPAP, orders supplies [facility] ([facility])/ Pt has no concerns with transportation . Pt plans to return home at discharge. SW will continue to follow for needs.                   Discharge Plan       Row Name 09/13/24 0950       Plan    Plan Pt lives at home with wife. Pt has good support. PCP: AKBAR Abdalla [facility]. Pt denies fin. stressors at this time. Pt has CPAP, orders supplies from [facility] ([facility])/ Pt has no concerns with transportation . Pt plans to return home at discharge. SW will continue to follow for needs                  Continued Care and Services - Admitted Since 9/12/2024    No active coordination exists for this encounter.          Demographic Summary       Row Name 09/13/24 0918       General Information    Admission Type observation    Arrived From emergency department    Referral Source admission list    Reason for Consult discharge planning    Preferred Language English       Contact Information    Permission Granted to Share Info With permission denied                   Functional Status       Row Name 09/13/24 0919       Functional Status    Usual Activity Tolerance good    Current Activity Tolerance good       Assessment of Health Literacy    How often do you have someone help you read hospital materials? Never    How often do you have problems learning about your medical condition because of difficulty understanding written information? Never    How often do you have a problem understanding what is told to you about your medical condition? Never    How confident are you filling out medical forms by yourself? Quite a bit    Health Literacy Good       Functional Status, IADL    Housekeeping independent    Laundry independent    Shopping independent       Mental Status    General Appearance WDL WDL        Mental Status Summary    Recent Changes in Mental Status/Cognitive Functioning no changes       Employment/    Employment Status retired    Current or Previous Occupation not applicable                   Psychosocial    No documentation.                  Abuse/Neglect    No documentation.                  Legal       Row Name 09/13/24 0920       Financial Resource Strain    How hard is it for you to pay for the very basics like food, housing, medical care, and heating? Not hard       Financial/Legal    Source of Income pension/intermediate    Application for Public Assistance not applied       Legal    Criminal Activity/Legal Involvement none                   Substance Abuse    No documentation.                  Patient Forms    No documentation.                     Araceli Vieira

## 2024-09-13 NOTE — SIGNIFICANT NOTE
09/13/24 1421   OTHER   Discipline occupational therapist   Rehab Time/Intention   Session Not Performed other (see comments)  (Hold, cardioversion)

## 2024-09-13 NOTE — PROGRESS NOTES
Date of service: 9/13/2024    Subjective: No chest pain, SOB, palpitations or dizziness    Review of systems: Negative for headache, vertigo, nausea, vomiting, abdominal pain, fever, GI or  bleed.  No TIA or CVA-like symptoms.    Physical examination: He was in no pain or respiratory distress  Vital signs: Reviewed  HEENT: Sclerae: Nonicteric.  EOMI  Neck: No JVD or carotid bruit.  No thyromegaly  Chest: CTA  Cardiac: RRR.  No murmurs or S3 gallop  Abdomen: Morbidly obese, soft and nontender.  No apparent megalies or masses  Extremities: No edema, cyanosis or clubbing.  Pulses: Intact.  Neuro: Alert, oriented x 3, no facial droop and speech was clear.     Records: Reviewed.     Assessment and plan:     1.  Recurrent atrial flutter, status post remote DC cardioversion in 2017 and July 2024.  2.  Oral anticoagulation with Xarelto  3.  Other medical problems as outlined in the consult note.  4.  DC cardioversion today.  5.  [facility]

## 2024-09-13 NOTE — PROGRESS NOTES
[facility]   Hospitalist Progress Note    Date of admission: 9/12/2024  Patient Name: Malik Abbasi  1965  Date: 9/13/2024      Subjective     Chief Complaint   Patient presents with    Rapid Heart Rate       Interval Followup: Already doing better this morning off dill drip had cardioversion this morning.  No chest pain pressure palpitations no shortness of air.  Discussed adjusting his home regimen.  Gets his medications at [facility] will need to send to [facility] if he leaves over this weekend      Objective     Vitals:   Temp:  [97.2 °F (36.2 °C)-98.2 °F (36.8 °C)] 97.3 °F (36.3 °C)  Heart Rate:  [] 60  Resp:  [14-20] 20  BP: ()/(59-84) 115/69    Physical Exam  Awake conversant  CTAB no wheezing  RRR no lower extremity pitting edema  Abdomen soft nontender  AOx3      Result Review:  Vital signs, labs and recent relevant imaging reviewed.      CBC          7/27/2024    05:01 9/12/2024    10:47 9/13/2024    04:35   CBC   WBC 7.36  7.68  7.91    RBC 5.14  5.48  5.02    Hemoglobin 15.3  16.6  15.0    Hematocrit 46.2  48.1  45.3    MCV 89.9  87.8  90.2    MCH 29.8  30.3  29.9    MCHC 33.1  34.5  33.1    RDW 13.2  13.1  13.0    Platelets 199  319  267      CMP          7/27/2024    05:01 9/12/2024    10:47 9/13/2024    04:35   CMP   Glucose 101  102  96    BUN 23  20  21    Creatinine 1.05  1.21  1.22    EGFR 80.8  68.1  67.5    Sodium 140  142  140    Potassium 3.6  3.8  3.6    Chloride 104  105  103    Calcium 9.0  9.1  8.9    Total Protein  7.7     Albumin  3.8     Globulin  3.9     Total Bilirubin  0.7     Alkaline Phosphatase  83     AST (SGOT)  27     ALT (SGPT)  36     Albumin/Globulin Ratio  1.0     BUN/Creatinine Ratio 21.9  16.5  17.2    Anion Gap 10.5  12.0  9.2          acetaminophen **OR** acetaminophen **OR** acetaminophen    senna-docusate sodium **AND** polyethylene glycol **AND** bisacodyl **AND** bisacodyl    melatonin    metoprolol tartrate    ondansetron ODT **OR**  ondansetron    sodium chloride    sodium chloride    sodium chloride    allopurinol, 300 mg, Oral, Daily  aspirin, 81 mg, Oral, Daily  magnesium oxide, 400 mg, Oral, Daily  metoprolol succinate XL, 50 mg, Oral, Q12H  pantoprazole, 40 mg, Oral, Daily  [START ON 9/14/2024] propafenone, 150 mg, Oral, Q8H  rivaroxaban, 20 mg, Oral, Daily With Dinner  rosuvastatin, 10 mg, Oral, Nightly  senna-docusate sodium, 2 tablet, Oral, BID  sodium chloride, 10 mL, Intravenous, Q12H  [START ON 9/14/2024] vitamin D, 50,000 Units, Oral, Q7 Days        XR Chest 1 View    Result Date: 9/12/2024  Impression: No active disease Electronically Signed: Cornelio Irwin MD  9/12/2024 11:34 AM EDT  Workstation ID: [id number]     Assessment / Plan     Summary: 59 y.o. with history of a flutter and prior cardioversion (2017, also more recently 7/2024), who presented with shortness of breath and dyspnea on exertion found to be in a flutter with RVR    Assessment/Plan (clinically significant if listed here)  A flutter with RVR on Xarelto s/p DCCV on 9/13   History of DCCV  Morbid obesity  MANDA on CPAP  GERD  Hyperlipidemia  Gout      Currently off diltiazem drip this morning, monitor heart rate closely on telemetry  [facility] today currently in sinus rhythm, likely need to follow-up with EP cardiology outpatient for possible ablation  Continue on metoprolol succinate dose increased from 50 once daily to twice daily, continues on propafenone every 8 hours start tomorrow  Replace potassium and magnesium   BNP checked and within normal limits, is not on a diuretic outpatient no obvious signs of volume overload on exam, chest x-ray personally reviewed, no obvious pulmonary edema or infiltrate   Appreciate cardiology assistance  Continue aspirin and statin  Continue PPI  Continue allopurinol  Continue CPAP at night and with sleep  PT/OT  Check a.m. CBC, BMP, magnesium, phosphorus  Continue hospitalization at current level of care    Dispo: Home likely tomorrow  per discussion with cardiology depending on stability of heart rate  D/w SW    VTE Prophylaxis:  Pharmacologic VTE prophylaxis orders are present.      Level Of Support Discussed With: Patient  Code Status (Patient has no pulse and is not breathing): CPR (Attempt to Resuscitate)  Medical Interventions (Patient has pulse or is breathing): Full Support

## 2024-09-14 ENCOUNTER — READMISSION MANAGEMENT (OUTPATIENT)
Dept: CALL CENTER | Facility: HOSPITAL | Age: 59
End: 2024-09-14
Payer: OTHER GOVERNMENT

## 2024-09-14 VITALS
HEIGHT: 72 IN | OXYGEN SATURATION: 98 % | SYSTOLIC BLOOD PRESSURE: 144 MMHG | DIASTOLIC BLOOD PRESSURE: 71 MMHG | HEART RATE: 53 BPM | RESPIRATION RATE: 16 BRPM | TEMPERATURE: 97.7 F | BODY MASS INDEX: 39.3 KG/M2 | WEIGHT: 290.13 LBS

## 2024-09-14 PROCEDURE — G0378 HOSPITAL OBSERVATION PER HR: HCPCS

## 2024-09-14 PROCEDURE — 99239 HOSP IP/OBS DSCHRG MGMT >30: CPT | Performed by: INTERNAL MEDICINE

## 2024-09-14 RX ORDER — PROPAFENONE HYDROCHLORIDE 150 MG/1
150 TABLET, COATED ORAL EVERY 8 HOURS SCHEDULED
Qty: 90 TABLET | Refills: 0 | Status: SHIPPED | OUTPATIENT
Start: 2024-09-14 | End: 2024-10-14

## 2024-09-14 RX ORDER — METOPROLOL SUCCINATE 25 MG/1
37.5 TABLET, EXTENDED RELEASE ORAL 2 TIMES DAILY
Qty: 90 TABLET | Refills: 0 | Status: SHIPPED | OUTPATIENT
Start: 2024-09-14 | End: 2024-10-14

## 2024-09-14 RX ADMIN — Medication 10 ML: at 10:03

## 2024-09-14 RX ADMIN — Medication 400 MG: at 10:03

## 2024-09-14 RX ADMIN — SENNOSIDES AND DOCUSATE SODIUM 2 TABLET: 50; 8.6 TABLET ORAL at 10:03

## 2024-09-14 RX ADMIN — ERGOCALCIFEROL 50000 UNITS: 1.25 CAPSULE ORAL at 10:03

## 2024-09-14 RX ADMIN — ASPIRIN 81 MG: 81 TABLET, COATED ORAL at 10:03

## 2024-09-14 RX ADMIN — PROPAFENONE HYDROCHLORIDE 150 MG: 150 TABLET, COATED ORAL at 10:11

## 2024-09-14 RX ADMIN — PANTOPRAZOLE SODIUM 40 MG: 40 TABLET, DELAYED RELEASE ORAL at 10:03

## 2024-09-14 RX ADMIN — ALLOPURINOL 300 MG: 300 TABLET ORAL at 10:03

## 2024-09-14 RX ADMIN — METOPROLOL SUCCINATE 50 MG: 50 TABLET, EXTENDED RELEASE ORAL at 10:05

## 2024-09-14 NOTE — DISCHARGE SUMMARY
[facility]         HOSPITALIST  DISCHARGE SUMMARY    Patient Name: Malik Abbasi    : 1965    MRN: [id number]    Date of Admission: 2024  Date of Discharge:  24  Primary Care Physician: Valeriano Abdalla PA    Consults       Date and Time Order Name Status Description    2024  1:37 PM Inpatient Hospitalist Consult      2024 11:59 AM Inpatient Cardiology Consult              Final Diagnosis:  A flutter with RVR on Xarelto s/p DCCV on  in sinus rhythm by time of discharge  History of DCCV  Morbid obesity  MANDA on CPAP  GERD  Hyperlipidemia  Gout    Hospital Course     Hospital Course:  : 59 y.o. with history of a flutter and prior cardioversion (, also more recently 2024), who presented with shortness of breath and dyspnea on exertion found to be in a flutter with RVR     Treated with adjusted home regimen, underwent DC cardioversion was in sinus rhythm at time of discharge.  Is going to monitor closely for bradycardia as outpatient, aware and instructions and close follow-up with Dr. Perez Garces and is going to notify them if having bradycardia with a heart rate less than 50.    Patient discharged in stable condition with close outpatient follow up. Return precautions and follow up discussed and patient voiced agreement and understanding of treatment plan.     DISCHARGE Follow Up Recommendations for labs and diagnostics:   As above    CODE STATUS:  Code Status and Medical Interventions: CPR (Attempt to Resuscitate); Full Support   Ordered at: 24 1631     Level Of Support Discussed With:    Patient     Code Status (Patient has no pulse and is not breathing):    CPR (Attempt to Resuscitate)     Medical Interventions (Patient has pulse or is breathing):    Full Support           Day of Discharge     Vital Signs:  Temp:  [97.3 °F (36.3 °C)-97.9 °F (36.6 °C)] 97.7 °F (36.5 °C)  Heart Rate:  [53-65] 53  Resp:  [16-20] 16  BP: (107-144)/(69-89)  144/71    Physical Exam    Gen: awake, resting in bed, conversant  Resp: breathing comfortably on room air  CV: RRR, no LE pitting edema      Discharge Details        Discharge Medications        New Medications        Instructions Start Date   propafenone 150 MG tablet  Commonly known as: RYTHMOL   150 mg, Oral, Every 8 Hours Scheduled             Changes to Medications        Instructions Start Date   metoprolol succinate XL 25 MG 24 hr tablet  Commonly known as: TOPROL-XL  What changed:   medication strength  how much to take  when to take this   37.5 mg, Oral, 2 Times Daily             Continue These Medications        Instructions Start Date   allopurinol 300 MG tablet  Commonly known as: ZYLOPRIM   300 mg, Oral, Daily      aspirin 81 MG EC tablet   Take 1 tablet by mouth Daily.      indomethacin 50 MG capsule  Commonly known as: INDOCIN   50 mg, Oral, As Needed      pantoprazole 40 MG EC tablet  Commonly known as: PROTONIX   40 mg, Oral, Daily      rosuvastatin 10 MG tablet  Commonly known as: CRESTOR   10 mg, Oral, Nightly      vitamin D 1.25 MG (65914 UT) capsule capsule  Commonly known as: ERGOCALCIFEROL   50,000 Units, Oral, Every 7 Days, SATURDAYS      Xarelto 20 MG tablet  Generic drug: rivaroxaban   20 mg, Oral, Daily With Dinner             Stop These Medications      amLODIPine 5 MG tablet  Commonly known as: NORVASC     Benicar HCT 40-25 MG per tablet  Generic drug: olmesartan-hydrochlorothiazide     Multaq 400 MG tablet  Generic drug: dronedarone                Discharge Disposition:  Home or Self Care    Diet: continue on diet / dietary restrictions from hospitalization     Discharge Activity: advance as tolerated      Additional Instructions for the Follow-ups that You Need to Schedule       Discharge Follow-up with PCP   As directed       Currently Documented PCP:    Valeriano Abdalla PA    PCP Phone Number:    [phone]     Follow Up Details: 3-5 days hospital f/u        Discharge  Follow-up with Specified Provider: 7-10 days dr rekha paul / cardiology   As directed      To: 7-10 days dr rekha paul / cardiology                Pertinent  and/or Most Recent Results       LAB RESULTS:      Lab 09/13/24  0435 09/12/24  1047   WBC 7.91 7.68   HEMOGLOBIN 15.0 16.6   HEMATOCRIT 45.3 48.1   PLATELETS 267 319   NEUTROS ABS 4.48 4.80   IMMATURE GRANS (ABS) 0.07* 0.03   LYMPHS ABS 2.53 1.97   MONOS ABS 0.45 0.48   EOS ABS 0.31 0.32   MCV 90.2 87.8         Lab 09/13/24  0435 09/12/24  1047   SODIUM 140 142   POTASSIUM 3.6 3.8   CHLORIDE 103 105   CO2 27.8 25.0   ANION GAP 9.2 12.0   BUN 21 20   CREATININE 1.22 1.21   EGFR 67.5 68.1   GLUCOSE 96 102*   CALCIUM 8.9 9.1   MAGNESIUM 1.9 2.0   PHOSPHORUS 3.6 3.7   TSH  --  2.470         Lab 09/12/24  1047   TOTAL PROTEIN 7.7   ALBUMIN 3.8   GLOBULIN 3.9   ALT (SGPT) 36   AST (SGOT) 27   BILIRUBIN 0.7   ALK PHOS 83         Lab 09/13/24  0435 09/12/24  1047   PROBNP 377.7  --    HSTROP T  --  14                 Brief Urine Lab Results       None          Microbiology Results (last 10 days)       ** No results found for the last 240 hours. **            RADIOLOGY:    XR Chest 1 View    Result Date: 9/12/2024  Impression: Impression: No active disease Electronically Signed: Cornelio Irwin MD  9/12/2024 11:34 AM EDT  Workstation ID: [id number]             Results for orders placed during the hospital encounter of 07/25/24    Adult Transthoracic Echo Complete W/ Cont if Necessary Per Protocol    Interpretation Summary    Left ventricular ejection fraction appears to be 51 - 55%.    Left ventricular wall thickness is consistent with mild concentric hypertrophy.    Left ventricular diastolic function was indeterminate.      Labs Pending at Discharge:        Time spent on Discharge including face to face service:  35 minutes

## 2024-09-14 NOTE — PROCEDURES
Date of procedure: 9/13/2024    Indications: Atrial flutter    Method: The benefits and risks of the procedure were explained to the patient.  Consent form was signed.  He was prepped in the usual manner.  He was totally sedated with IV Versed and Demerol.  Synchronized with a biphasic shock 200 J x 1 delivered to the patient and converted him into sinus rhythm.  Postprocedure EKG showed sinus rhythm.  There were no complications.

## 2024-09-14 NOTE — PROGRESS NOTES
Date of service: 9/14/2024    The patient was seen and examined earlier this morning.  The plan was also discussed with him and wife.    Subjective: No chest pain, palpitations, SOB or lightheadedness    Review of systems: Negative for headache, vertigo, nausea, vomiting, abdominal pain, fever, GI or  bleed.  No TIA or CVA-like symptoms.     Physical examination: He was in no pain or respiratory distress  Vital signs: Reviewed  HEENT: Sclerae: Nonicteric.  EOMI  Neck: No JVD or carotid bruit.  No thyromegaly  Chest: CTA  Cardiac: RRR.  No murmurs or S3 gallop  Abdomen: Morbidly obese, soft and nontender.  No apparent megalies or masses  Extremities: No edema, cyanosis or clubbing.  Pulses: Intact.  Neuro: Alert, oriented x 3, no facial droop and speech was clear.     Records: Reviewed.     Assessment and plan:     1.  Recurrent atrial flutter, status post remote DC cardioversion in 2017 and July 2024.  Status post repeat cardioversion on 9/13/2024.  Has been in sinus rhythm.  2.  Oral anticoagulation with Xarelto  3.  Other medical problems as outlined in the consult note.  4.  DC Multaq.  5.  Start Rythmol 150 mg p.o. 3 times daily.  Continue metoprolol succinate 37.5 mg p.o. twice daily.  6.  If he has recurrent atrial fibrillation or flutter, we will refer him to an electrophysiologist for ablation.  7.  Discussed the aforementioned plan with the patient and Dr. Ethan Martinez.  8.  Cardiac stable.  May be discharged today.  Monitor BP and HR at home twice daily.  Keep the recordings for future assessment.  Follow-up will be in the office in 2 weeks.

## 2024-09-14 NOTE — DISCHARGE INSTRUCTIONS
Stop your amlodipine and Benicar for now do not throw these away when you follow-up with Dr. Perez Garces he will reassess and see if you need to resume at that time.  Will continue with metoprolol succinate at the 37.5 dose twice a day along with the new medication Rythmol AKA propafenone.  Dr. Shukla would like you to call him if your heart rate is running consistently below 50 for further instructions

## 2024-09-14 NOTE — OUTREACH NOTE
Prep Survey      Flowsheet Row Responses   [facility] facility patient discharged from? [facility]   Is LACE score < 7 ? Yes   Eligibility Not Eligible   What are the reasons patient is not eligible? Other  [Low risk for readmission]   Does the patient have one of the following disease processes/diagnoses(primary or secondary)? Other   Prep survey completed? Yes            Miracle ANAND - Registered Nurse

## 2024-09-29 LAB
QT INTERVAL: 346 MS
QTC INTERVAL: 497 MS

## 2024-11-06 ENCOUNTER — TRANSCRIBE ORDERS (OUTPATIENT)
Dept: ADMINISTRATIVE | Facility: HOSPITAL | Age: 59
End: 2024-11-06
Payer: OTHER GOVERNMENT

## 2024-11-06 DIAGNOSIS — J98.4 RESTRICTIVE LUNG DISEASE: ICD-10-CM

## 2024-11-06 DIAGNOSIS — R06.02 SOB (SHORTNESS OF BREATH): Primary | ICD-10-CM

## 2025-01-07 ENCOUNTER — HOSPITAL ENCOUNTER (OUTPATIENT)
Dept: RESPIRATORY THERAPY | Facility: HOSPITAL | Age: 60
Discharge: HOME | End: 2025-01-07
Payer: OTHER GOVERNMENT

## 2025-01-07 DIAGNOSIS — J98.4 RESTRICTIVE LUNG DISEASE: ICD-10-CM

## 2025-01-07 DIAGNOSIS — R06.02 SOB (SHORTNESS OF BREATH): ICD-10-CM

## 2025-01-07 PROCEDURE — 94060 EVALUATION OF WHEEZING: CPT

## 2025-01-07 PROCEDURE — 94726 PLETHYSMOGRAPHY LUNG VOLUMES: CPT

## 2025-01-07 PROCEDURE — 94729 DIFFUSING CAPACITY: CPT

## 2025-01-07 PROCEDURE — 94618 PULMONARY STRESS TESTING: CPT

## 2025-01-07 RX ORDER — ALBUTEROL SULFATE 0.83 MG/ML
2.5 SOLUTION RESPIRATORY (INHALATION) ONCE
Status: COMPLETED | OUTPATIENT
Start: 2025-01-07 | End: 2025-01-07

## 2025-01-07 RX ADMIN — ALBUTEROL SULFATE 2.5 MG: 2.5 SOLUTION RESPIRATORY (INHALATION) at 10:12

## 2025-01-07 NOTE — PROGRESS NOTES
"Exercise Oximetry    Patient Name:Malik Abbasi   MRN: [id number]   Date: 01/07/25             ROOM AIR BASELINE   SpO2% 95   Heart Rate 53   Blood Pressure 145/87     EXERCISE ON ROOM AIR SpO2% EXERCISE ON O2 @  LPM SpO2%   1 MINUTE 96 1 MINUTE    2 MINUTES 93 2 MINUTES    3 MINUTES 93 3 MINUTES    4 MINUTES 94 4 MINUTES    5 MINUTES 95 5 MINUTES    6 MINUTES 94 6 MINUTES               Distance Walked  1440' Distance Walked   Dyspnea (Nereida Scale)  Pre-1 Post-1 Dyspnea (Nereida Scale)   Fatigue (Nereida Scale)  1 Fatigue (Nereida Scale)   SpO2% Post Exercise  96 SpO2% Post Exercise   HR Post Exercise  64 HR Post Exercise   Time to Recovery  1'10\" Time to Recovery     Comments:   "

## 2025-03-05 ENCOUNTER — PREP FOR SURGERY (OUTPATIENT)
Dept: OTHER | Facility: HOSPITAL | Age: 60
End: 2025-03-05
Payer: OTHER GOVERNMENT

## 2025-03-05 ENCOUNTER — OFFICE VISIT (OUTPATIENT)
Dept: ORTHOPEDIC SURGERY | Facility: CLINIC | Age: 60
End: 2025-03-05
Payer: OTHER GOVERNMENT

## 2025-03-05 VITALS
DIASTOLIC BLOOD PRESSURE: 80 MMHG | OXYGEN SATURATION: 95 % | HEIGHT: 72 IN | BODY MASS INDEX: 42.66 KG/M2 | WEIGHT: 315 LBS | SYSTOLIC BLOOD PRESSURE: 159 MMHG | HEART RATE: 50 BPM

## 2025-03-05 DIAGNOSIS — M17.12 OSTEOARTHRITIS OF LEFT KNEE, UNSPECIFIED OSTEOARTHRITIS TYPE: ICD-10-CM

## 2025-03-05 DIAGNOSIS — M17.12 OSTEOARTHRITIS OF LEFT KNEE, UNSPECIFIED OSTEOARTHRITIS TYPE: Primary | ICD-10-CM

## 2025-03-05 DIAGNOSIS — M25.562 LEFT KNEE PAIN, UNSPECIFIED CHRONICITY: Primary | ICD-10-CM

## 2025-03-05 RX ORDER — TRANEXAMIC ACID 10 MG/ML
1000 INJECTION, SOLUTION INTRAVENOUS ONCE
OUTPATIENT
Start: 2025-03-05 | End: 2025-03-05

## 2025-03-05 RX ORDER — FLECAINIDE ACETATE 100 MG/1
100 TABLET ORAL
COMMUNITY
Start: 2024-10-09 | End: 2025-10-08

## 2025-03-05 RX ORDER — AMLODIPINE BESYLATE 5 MG/1
TABLET ORAL
COMMUNITY
Start: 2025-02-20

## 2025-03-05 NOTE — PROGRESS NOTES
"Chief Complaint  Initial Evaluation of the Left Knee     Subjective      Malik Abbasi presents to [facility] for initial evaluation of the left knee. He has pain in the knee.  He has a lot of start up pain.  He has had an arthroscopy in the past.  He has pain with prolonged standing, ambulation and stairs.  He notes the pain is affecting his daily tasks and ADL's.  He has decrease ROM with extension.  He has pain with prolonged standing, ambulation and stairs.      Allergies   Allergen Reactions    Codeine Other (See Comments)     Syncope        Social History     Socioeconomic History    Marital status:    Tobacco Use    Smoking status: Former     Current packs/day: 0.00     Types: Cigarettes     Quit date: 2006     Years since quittin.4    Smokeless tobacco: Never   Vaping Use    Vaping status: Never Used   Substance and Sexual Activity    Alcohol use: Yes     Comment: rarely    Drug use: No    Sexual activity: Defer        I reviewed the patient's chief complaint, history of present illness, review of systems, past medical history, surgical history, family history, social history, medications, and allergy list.     Review of Systems     Constitutional: Denies fevers, chills, weight loss  Cardiovascular: Denies chest pain, shortness of breath  Skin: Denies rashes, acute skin changes  Neurologic: Denies headache, loss of consciousness        Vital Signs:   /80   Pulse 50   Ht 182.9 cm (72\")   Wt (!) 147 kg (324 lb 12.8 oz)   SpO2 95%   BMI 44.05 kg/m²          Physical Exam  General: Alert. No acute distress    Ortho Exam        LEFT KNEE Flexion 100. Extension -8. Stable to varus/valgus stress. Stable to anterior/posterior drawer. Neurovascularly intact. Negative Sun. Negative Lachman. Positive EHL, FHL, HS and TA. Sensation intact to light touch all 5 nerves of the foot. Ambulates with Antalgic gait. Patella is well tracking. Calf supple, " non-tender. Positive tenderness to the medial joint line. Positive tenderness to the lateral joint line. Positive Crepitus. Good strength to hamstrings, quadriceps, dorsiflexors, and plantar flexors.  Knee Extensor Mechanism intact Mild swelling.  Kellgren Hal grading scale is a 4.         Procedures        Imaging Results (Most Recent)       Procedure Component Value Units Date/Time    XR Knee Standing Left [[id number]] Resulted: 03/05/25 1504     Updated: 03/05/25 1509             Result Review :     X-Ray Report:  Left knee X-Ray  Indication: Evaluation of the left knee  AP/Lateral and Odell view(s)  Findings: Moderately advanced degenerative arthritis of the left knee.    Prior studies available for comparison: no             Assessment and Plan     Diagnoses and all orders for this visit:    1. Left knee pain, unspecified chronicity (Primary)  -     XR Knee Standing Left    2. Osteoarthritis of left knee, unspecified osteoarthritis type    3. BMI 40.0-44.9, adult        Discussed the treatment plan with the patient. I reviewed the X-rays that were obtained today with the patient.     Discussed the treatment options with the patient, operative vs non-operative. The patient expressed understanding and wished to proceed with a left total knee arthroplasty.        Educated on risk of elevated BMI.  Discussed options for weight loss/decreasing BMI prior to procedure including dietician consult, weight loss options and exercise program., Discussed surgery., Risks/benefits discussed with patient including, but not limited to: infection, bleeding, neurovascular damage, re-rupture, aesthetic deformity, need for further surgery, and death., Discussed with patient the implant type being used during surgery and patient understands., Surgery pamphlet given., Call or return if worsening symptoms., DME order for a 3 in 1 given today due to patient will be confined to one room/level of the home that does not offer a  toilet during postop recovery. , I am prescribing  the Beka® Pro2.0 sustained acoustic medicine device for the acceleration of soft tissue repair and reduction of pain. , I am ordering the use of the Nice1 cold therapy machine for 60 days post-op as part of an opioid-sparing approach to help manage pain and edema.  I feel this is medically necessary for the best care for this patient.   , IFC can help extend pain relief and hopefully reduce need for pain medication. TENS is used to control break through pain. NMES is used to help and strengthen weak muscles and prevent atrophy., and Patient does not have any metal allergies.     Follow Up     2 weeks postoperatively       Patient was given instructions and counseling regarding his condition or for health maintenance advice. Please see specific information pulled into the AVS if appropriate.     Scribed for Fred Chang MD by Sherry Sheikh MA.  03/05/25   15:13 EST    I have personally performed the services described in this document as scribed by the above individual and it is both accurate and complete. Fred Chang MD 03/05/25

## 2025-03-11 ENCOUNTER — TELEPHONE (OUTPATIENT)
Dept: ORTHOPEDIC SURGERY | Facility: CLINIC | Age: 60
End: 2025-03-11
Payer: OTHER GOVERNMENT

## 2025-03-11 NOTE — TELEPHONE ENCOUNTER
PT NOTIFIED PER CARDIAC CLEARANCE/MED DIRECTIVE RECEIVED FROM DR. HERRERA HE IS TO HOLD/STOP XARELTO 3 DAYS AND ASPIRIN 81 MG 7 DAYS PRIOR TO SURGERY. PATIENT VERBALIZED UNDERSTANDING.

## 2025-04-16 DIAGNOSIS — Z47.1 AFTERCARE FOLLOWING LEFT KNEE JOINT REPLACEMENT SURGERY: Primary | ICD-10-CM

## 2025-04-16 DIAGNOSIS — Z96.652 AFTERCARE FOLLOWING LEFT KNEE JOINT REPLACEMENT SURGERY: Primary | ICD-10-CM

## 2025-04-28 ENCOUNTER — PRE-ADMISSION TESTING (OUTPATIENT)
Dept: PREADMISSION TESTING | Facility: HOSPITAL | Age: 60
End: 2025-04-28
Payer: OTHER GOVERNMENT

## 2025-04-28 VITALS
SYSTOLIC BLOOD PRESSURE: 141 MMHG | RESPIRATION RATE: 20 BRPM | BODY MASS INDEX: 44.1 KG/M2 | TEMPERATURE: 98.1 F | DIASTOLIC BLOOD PRESSURE: 81 MMHG | WEIGHT: 315 LBS | HEART RATE: 50 BPM | OXYGEN SATURATION: 97 % | HEIGHT: 71 IN

## 2025-04-28 DIAGNOSIS — M17.12 OSTEOARTHRITIS OF LEFT KNEE, UNSPECIFIED OSTEOARTHRITIS TYPE: ICD-10-CM

## 2025-04-28 DIAGNOSIS — Z01.818 ENCOUNTER FOR PREADMISSION TESTING: Primary | ICD-10-CM

## 2025-04-28 LAB
ALBUMIN SERPL-MCNC: 3.9 G/DL (ref 3.5–5.2)
ALBUMIN/GLOB SERPL: 1.1 G/DL
ALP SERPL-CCNC: 78 U/L (ref 39–117)
ALT SERPL W P-5'-P-CCNC: 55 U/L (ref 1–41)
ANION GAP SERPL CALCULATED.3IONS-SCNC: 12.1 MMOL/L (ref 5–15)
AST SERPL-CCNC: 50 U/L (ref 1–40)
BASOPHILS # BLD AUTO: 0.06 10*3/MM3 (ref 0–0.2)
BASOPHILS NFR BLD AUTO: 1 % (ref 0–1.5)
BILIRUB SERPL-MCNC: 0.7 MG/DL (ref 0–1.2)
BUN SERPL-MCNC: 19 MG/DL (ref 8–23)
BUN/CREAT SERPL: 19.2 (ref 7–25)
CALCIUM SPEC-SCNC: 9 MG/DL (ref 8.6–10.5)
CHLORIDE SERPL-SCNC: 103 MMOL/L (ref 98–107)
CO2 SERPL-SCNC: 22.9 MMOL/L (ref 22–29)
CREAT SERPL-MCNC: 0.99 MG/DL (ref 0.76–1.27)
DEPRECATED RDW RBC AUTO: 42.7 FL (ref 37–54)
EGFRCR SERPLBLD CKD-EPI 2021: 86.7 ML/MIN/1.73
EOSINOPHIL # BLD AUTO: 0.17 10*3/MM3 (ref 0–0.4)
EOSINOPHIL NFR BLD AUTO: 2.9 % (ref 0.3–6.2)
ERYTHROCYTE [DISTWIDTH] IN BLOOD BY AUTOMATED COUNT: 13.3 % (ref 12.3–15.4)
GLOBULIN UR ELPH-MCNC: 3.5 GM/DL
GLUCOSE SERPL-MCNC: 118 MG/DL (ref 65–99)
HBA1C MFR BLD: 5.4 % (ref 4.8–5.6)
HCT VFR BLD AUTO: 45.8 % (ref 37.5–51)
HGB BLD-MCNC: 16.2 G/DL (ref 13–17.7)
IMM GRANULOCYTES # BLD AUTO: 0.03 10*3/MM3 (ref 0–0.05)
IMM GRANULOCYTES NFR BLD AUTO: 0.5 % (ref 0–0.5)
LYMPHOCYTES # BLD AUTO: 1.57 10*3/MM3 (ref 0.7–3.1)
LYMPHOCYTES NFR BLD AUTO: 26.6 % (ref 19.6–45.3)
MCH RBC QN AUTO: 31.4 PG (ref 26.6–33)
MCHC RBC AUTO-ENTMCNC: 35.4 G/DL (ref 31.5–35.7)
MCV RBC AUTO: 88.8 FL (ref 79–97)
MONOCYTES # BLD AUTO: 0.47 10*3/MM3 (ref 0.1–0.9)
MONOCYTES NFR BLD AUTO: 8 % (ref 5–12)
NEUTROPHILS NFR BLD AUTO: 3.61 10*3/MM3 (ref 1.7–7)
NEUTROPHILS NFR BLD AUTO: 61 % (ref 42.7–76)
NRBC BLD AUTO-RTO: 0 /100 WBC (ref 0–0.2)
PLATELET # BLD AUTO: 210 10*3/MM3 (ref 140–450)
PMV BLD AUTO: 10.3 FL (ref 6–12)
POTASSIUM SERPL-SCNC: 3.7 MMOL/L (ref 3.5–5.2)
PROT SERPL-MCNC: 7.4 G/DL (ref 6–8.5)
RBC # BLD AUTO: 5.16 10*6/MM3 (ref 4.14–5.8)
SODIUM SERPL-SCNC: 138 MMOL/L (ref 136–145)
WBC NRBC COR # BLD AUTO: 5.91 10*3/MM3 (ref 3.4–10.8)

## 2025-04-28 PROCEDURE — 93005 ELECTROCARDIOGRAM TRACING: CPT

## 2025-04-28 PROCEDURE — 83036 HEMOGLOBIN GLYCOSYLATED A1C: CPT

## 2025-04-28 PROCEDURE — 80053 COMPREHEN METABOLIC PANEL: CPT

## 2025-04-28 PROCEDURE — 36415 COLL VENOUS BLD VENIPUNCTURE: CPT

## 2025-04-28 PROCEDURE — 85025 COMPLETE CBC W/AUTO DIFF WBC: CPT

## 2025-04-28 RX ORDER — OLMESARTAN MEDOXOMIL AND HYDROCHLOROTHIAZIDE 40/25 40; 25 MG/1; MG/1
1 TABLET ORAL DAILY
COMMUNITY
Start: 2025-02-17

## 2025-04-28 NOTE — DISCHARGE INSTRUCTIONS
IMPORTANT INSTRUCTIONS - PRE-ADMISSION TESTING  DO NOT EAT OR CHEW anything after midnight the night before your procedure.    You may have CLEAR liquids up to __3____ hours prior to ARRIVAL time.   Take the following medications the morning of your procedure with JUST A SIP OF WATER:  __metoprolol, pantoprazole, flecanide   Hold xeralto 3 days prior     Hold aspirin 7 days prior to surgery    Do not take benicar am of surgery _____________________________________________________________________________________________________________________________________________________________________________________    DO NOT BRING your medications to the hospital with you, UNLESS something has changed since your PRE-Admission Testing appointment.  Hold all vitamins, supplements, and NSAIDS (Non- steroidal anti-inflammatory meds) for one week prior to surgery (you MAY take Tylenol or Acetaminophen).  Use your inhalers/nebulizers as usual, the morning of your procedure. BRING YOUR INHALERS with you.   Bring your CPAP or BIPAP to hospital, ONLY IF YOU WILL BE SPENDING THE NIGHT.   Make sure you have a ride home and have someone who will stay with you the day of your procedure after you go home.  If you have any questions, please call your Pre-Admission Testing Nurse, _JANAE CHAU RN.at [phone].   Per anesthesia request, do not smoke for 24 hours before your procedure or as instructed by your surgeon.    Clear Liquid Diet        Find out when you need to start a clear liquid diet.   Think of “clear liquids” as anything you could read a newspaper through. This includes things like water, broth, sports drinks, or tea WITHOUT any kind of milk or cream.           Once you are told to start a clear liquid diet, only drink these things until 2 hours before arrival to the hospital or when the hospital says to stop. Total volume limitation: 8 oz.       Clear liquids you CAN drink:   Water   Clear broth: beef, chicken, vegetable, or  bone broth with nothing in it   Gatorade   Lemonade or Ramez-aid   Soda   Tea, coffee (NO cream or honey)   Jell-O (without fruit)   Popsicles (without fruit or cream)   Italian ices   Juice without pulp: apple, white, grape   You may use salt, pepper, and sugar  DRINK A 20 0Z BOTTLE OF GATORADE AM OF SURGERY 3 HOURS PRIOR TO YOUR ARRIVAL  NO RED LIQUIDS     Do NOT drink:   Milk or cream   Soy milk, almond milk, coconut milk, or other non-dairy drinks and   creamers   Milkshakes or smoothies   Tomato juice   Orange juice   Grapefruit juice   Cream soups or any other than broth         Clear Liquid Diet:  Do NOT eat any solid food.  Do NOT eat or suck on mints or candy.  Do NOT chew gum.  Do NOT drink thick liquids like milk or juice with pulp in it.  Do NOT add milk, cream, or anything like soy milk or almond milk to coffee or tea.       PREOPERATIVE (BEFORE SURGERY)              BATHING INSTRUCTIONS  Instructions:    You will need to shower 3 separate times utilizing the soap provided; at the times indicated   below:   5/4/25 SUNDAY NIGHT  5/5/25 MONDAY MORNING   5/5/25 MONDAY MORNING      Wash your hair and face with normal shampoo and soap, rinse it well before using the surgical soap.      In the shower, wet the skin completely with water from your neck to your feet. Apply the cleanser to your   body ONLY FROM THE NECK TO YOUR FEET.     Do NOT USE THE CLEANSER ON YOUR FACE, HEAD, OR GENITAL (PRIVATE) AREAS.   Keep it out of your eyes, ears, and mouth because of the risk of injury to those areas.      Scrub with a clean washcloth for each bath utilizing the soap provided from the top of your body to the   bottom starting at the neck area.      Pay close attention to your armpits, groin area, and the site of surgery.      Wash your body gently for 5 minutes. Stand outside the stream or turn off the water while scrubbing your   body. Do NOT wash with your regular soap after the surgical cleanser is used.      RINSE  THE CLEANSER OFF COMPLETELY with plenty of water. Rinse the area again thoroughly.      Dry off with a clean towel. The surgical soap can cause dryness; however do NOT APPLY LOTION,   CREAM, POWDER, and/or DEODORANT AFTER SHOWERING.     Be sure to where clean clothes after showering.      Ensure CLEAN BED LINENS AFTER FIRST wash with the surgical soap.      NO PETS ALLOWED IN THE BED with you after utilizing the surgical soap.

## 2025-05-01 LAB
QT INTERVAL: 491 MS
QTC INTERVAL: 416 MS

## 2025-05-06 ENCOUNTER — ANESTHESIA EVENT (OUTPATIENT)
Dept: PERIOP | Facility: HOSPITAL | Age: 60
End: 2025-05-06
Payer: OTHER GOVERNMENT

## 2025-05-06 ENCOUNTER — ANESTHESIA EVENT CONVERTED (OUTPATIENT)
Dept: ANESTHESIOLOGY | Facility: HOSPITAL | Age: 60
End: 2025-05-06
Payer: OTHER GOVERNMENT

## 2025-05-06 ENCOUNTER — APPOINTMENT (OUTPATIENT)
Dept: GENERAL RADIOLOGY | Facility: HOSPITAL | Age: 60
End: 2025-05-06
Payer: OTHER GOVERNMENT

## 2025-05-06 ENCOUNTER — HOSPITAL ENCOUNTER (OUTPATIENT)
Facility: HOSPITAL | Age: 60
Discharge: HOME | End: 2025-05-06
Attending: ORTHOPAEDIC SURGERY | Admitting: ORTHOPAEDIC SURGERY
Payer: OTHER GOVERNMENT

## 2025-05-06 ENCOUNTER — ANESTHESIA (OUTPATIENT)
Dept: PERIOP | Facility: HOSPITAL | Age: 60
End: 2025-05-06
Payer: OTHER GOVERNMENT

## 2025-05-06 VITALS
TEMPERATURE: 97.3 F | SYSTOLIC BLOOD PRESSURE: 125 MMHG | DIASTOLIC BLOOD PRESSURE: 77 MMHG | HEIGHT: 71 IN | OXYGEN SATURATION: 98 % | RESPIRATION RATE: 18 BRPM | BODY MASS INDEX: 44.1 KG/M2 | WEIGHT: 315 LBS | HEART RATE: 61 BPM

## 2025-05-06 DIAGNOSIS — M17.12 OSTEOARTHRITIS OF LEFT KNEE, UNSPECIFIED OSTEOARTHRITIS TYPE: ICD-10-CM

## 2025-05-06 DIAGNOSIS — M17.12 PRIMARY OSTEOARTHRITIS OF LEFT KNEE: ICD-10-CM

## 2025-05-06 DIAGNOSIS — Z74.09 IMPAIRED MOBILITY AND ADLS: Primary | ICD-10-CM

## 2025-05-06 DIAGNOSIS — Z78.9 IMPAIRED MOBILITY AND ADLS: Primary | ICD-10-CM

## 2025-05-06 DIAGNOSIS — R26.2 DIFFICULTY IN WALKING: ICD-10-CM

## 2025-05-06 PROCEDURE — 25010000002 SUGAMMADEX 200 MG/2ML SOLUTION

## 2025-05-06 PROCEDURE — 25010000002 HYDROMORPHONE 1 MG/ML SOLUTION

## 2025-05-06 PROCEDURE — 20985 CPTR-ASST DIR MS PX: CPT | Performed by: ORTHOPAEDIC SURGERY

## 2025-05-06 PROCEDURE — 25810000003 LACTATED RINGERS PER 1000 ML: Performed by: ORTHOPAEDIC SURGERY

## 2025-05-06 PROCEDURE — S0260 H&P FOR SURGERY: HCPCS | Performed by: ORTHOPAEDIC SURGERY

## 2025-05-06 PROCEDURE — 25010000002 FENTANYL CITRATE (PF) 50 MCG/ML SOLUTION: Performed by: ANESTHESIOLOGY

## 2025-05-06 PROCEDURE — 25010000002 ONDANSETRON PER 1 MG

## 2025-05-06 PROCEDURE — 73560 X-RAY EXAM OF KNEE 1 OR 2: CPT

## 2025-05-06 PROCEDURE — 25010000002 GLYCOPYRROLATE 0.2 MG/ML SOLUTION: Performed by: ANESTHESIOLOGY

## 2025-05-06 PROCEDURE — 25010000002 DEXAMETHASONE PER 1 MG: Performed by: ANESTHESIOLOGY

## 2025-05-06 PROCEDURE — 97165 OT EVAL LOW COMPLEX 30 MIN: CPT

## 2025-05-06 PROCEDURE — 97116 GAIT TRAINING THERAPY: CPT

## 2025-05-06 PROCEDURE — 25010000002 ROPIVACAINE PER 1 MG: Performed by: ORTHOPAEDIC SURGERY

## 2025-05-06 PROCEDURE — 97161 PT EVAL LOW COMPLEX 20 MIN: CPT

## 2025-05-06 PROCEDURE — 25010000002 EPINEPHRINE 1 MG/ML SOLUTION: Performed by: ORTHOPAEDIC SURGERY

## 2025-05-06 PROCEDURE — 25010000002 CEFAZOLIN 3 G RECONSTITUTED SOLUTION 1 EACH VIAL: Performed by: ORTHOPAEDIC SURGERY

## 2025-05-06 PROCEDURE — 25810000003 LACTATED RINGERS PER 1000 ML: Performed by: ANESTHESIOLOGY

## 2025-05-06 PROCEDURE — 25010000002 PROPOFOL 10 MG/ML EMULSION

## 2025-05-06 PROCEDURE — 97530 THERAPEUTIC ACTIVITIES: CPT

## 2025-05-06 PROCEDURE — 27447 TOTAL KNEE ARTHROPLASTY: CPT | Performed by: ORTHOPAEDIC SURGERY

## 2025-05-06 PROCEDURE — C1713 ANCHOR/SCREW BN/BN,TIS/BN: HCPCS | Performed by: ORTHOPAEDIC SURGERY

## 2025-05-06 PROCEDURE — 25010000002 KETOROLAC TROMETHAMINE PER 15 MG: Performed by: ORTHOPAEDIC SURGERY

## 2025-05-06 PROCEDURE — 25010000002 HYDROMORPHONE PER 4 MG: Performed by: ORTHOPAEDIC SURGERY

## 2025-05-06 PROCEDURE — 25010000002 MIDAZOLAM PER 1MG: Performed by: ANESTHESIOLOGY

## 2025-05-06 PROCEDURE — 25010000002 LIDOCAINE PF 2% 2 % SOLUTION

## 2025-05-06 PROCEDURE — 25010000002 ONDANSETRON PER 1 MG: Performed by: ORTHOPAEDIC SURGERY

## 2025-05-06 PROCEDURE — C1776 JOINT DEVICE (IMPLANTABLE): HCPCS | Performed by: ORTHOPAEDIC SURGERY

## 2025-05-06 DEVICE — CMT BONE PALACOS R HI/VISC 1X40: Type: IMPLANTABLE DEVICE | Site: KNEE | Status: FUNCTIONAL

## 2025-05-06 DEVICE — IMPLANTABLE DEVICE: Type: IMPLANTABLE DEVICE | Site: KNEE | Status: FUNCTIONAL

## 2025-05-06 DEVICE — COMP FEM/KN PERSONA CR CMT COCR STD SZ10 LT: Type: IMPLANTABLE DEVICE | Site: KNEE | Status: FUNCTIONAL

## 2025-05-06 DEVICE — ART/SRF KN PERSONA/VE PS MC GH 8TO11 10MM LT: Type: IMPLANTABLE DEVICE | Site: KNEE | Status: FUNCTIONAL

## 2025-05-06 DEVICE — CAP TOTL KN CMT PRIMARY W/ROSA: Type: IMPLANTABLE DEVICE | Site: KNEE | Status: FUNCTIONAL

## 2025-05-06 DEVICE — STEM TIB/KN PERSONA CMT 5D SZG LT: Type: IMPLANTABLE DEVICE | Site: KNEE | Status: FUNCTIONAL

## 2025-05-06 RX ORDER — ONDANSETRON 2 MG/ML
INJECTION INTRAMUSCULAR; INTRAVENOUS AS NEEDED
Status: DISCONTINUED | OUTPATIENT
Start: 2025-05-06 | End: 2025-05-06 | Stop reason: SURG

## 2025-05-06 RX ORDER — PROMETHAZINE HYDROCHLORIDE 25 MG/1
25 TABLET ORAL ONCE AS NEEDED
Status: DISCONTINUED | OUTPATIENT
Start: 2025-05-06 | End: 2025-05-06 | Stop reason: HOSPADM

## 2025-05-06 RX ORDER — ONDANSETRON 2 MG/ML
4 INJECTION INTRAMUSCULAR; INTRAVENOUS EVERY 6 HOURS PRN
Status: DISCONTINUED | OUTPATIENT
Start: 2025-05-06 | End: 2025-05-06 | Stop reason: HOSPADM

## 2025-05-06 RX ORDER — DEXAMETHASONE SODIUM PHOSPHATE 4 MG/ML
INJECTION, SOLUTION INTRA-ARTICULAR; INTRALESIONAL; INTRAMUSCULAR; INTRAVENOUS; SOFT TISSUE AS NEEDED
Status: DISCONTINUED | OUTPATIENT
Start: 2025-05-06 | End: 2025-05-06 | Stop reason: SURG

## 2025-05-06 RX ORDER — AMOXICILLIN 250 MG
2 CAPSULE ORAL 2 TIMES DAILY PRN
Status: DISCONTINUED | OUTPATIENT
Start: 2025-05-06 | End: 2025-05-06 | Stop reason: HOSPADM

## 2025-05-06 RX ORDER — ACETAMINOPHEN 500 MG
1000 TABLET ORAL ONCE
Status: COMPLETED | OUTPATIENT
Start: 2025-05-06 | End: 2025-05-06

## 2025-05-06 RX ORDER — DEXAMETHASONE SODIUM PHOSPHATE 4 MG/ML
INJECTION, SOLUTION INTRA-ARTICULAR; INTRALESIONAL; INTRAMUSCULAR; INTRAVENOUS; SOFT TISSUE
Status: COMPLETED | OUTPATIENT
Start: 2025-05-06 | End: 2025-05-06

## 2025-05-06 RX ORDER — ONDANSETRON 4 MG/1
4 TABLET, ORALLY DISINTEGRATING ORAL EVERY 6 HOURS PRN
Status: DISCONTINUED | OUTPATIENT
Start: 2025-05-06 | End: 2025-05-06 | Stop reason: HOSPADM

## 2025-05-06 RX ORDER — ONDANSETRON 4 MG/1
4 TABLET, FILM COATED ORAL EVERY 8 HOURS PRN
Qty: 20 TABLET | Refills: 0 | Status: SHIPPED | OUTPATIENT
Start: 2025-05-06

## 2025-05-06 RX ORDER — NALOXONE HCL 0.4 MG/ML
0.4 VIAL (ML) INJECTION
Status: DISCONTINUED | OUTPATIENT
Start: 2025-05-06 | End: 2025-05-06 | Stop reason: HOSPADM

## 2025-05-06 RX ORDER — TRANEXAMIC ACID 10 MG/ML
1000 INJECTION, SOLUTION INTRAVENOUS ONCE
Status: COMPLETED | OUTPATIENT
Start: 2025-05-06 | End: 2025-05-06

## 2025-05-06 RX ORDER — OXYCODONE AND ACETAMINOPHEN 7.5; 325 MG/1; MG/1
1 TABLET ORAL EVERY 4 HOURS PRN
Qty: 45 TABLET | Refills: 0 | Status: SHIPPED | OUTPATIENT
Start: 2025-05-06

## 2025-05-06 RX ORDER — DEXAMETHASONE SODIUM PHOSPHATE 4 MG/ML
INJECTION, SOLUTION INTRA-ARTICULAR; INTRALESIONAL; INTRAMUSCULAR; INTRAVENOUS; SOFT TISSUE
Status: COMPLETED
Start: 2025-05-06 | End: 2025-05-06

## 2025-05-06 RX ORDER — PROMETHAZINE HYDROCHLORIDE 25 MG/1
25 SUPPOSITORY RECTAL ONCE AS NEEDED
Status: DISCONTINUED | OUTPATIENT
Start: 2025-05-06 | End: 2025-05-06 | Stop reason: HOSPADM

## 2025-05-06 RX ORDER — BUPIVACAINE HYDROCHLORIDE AND EPINEPHRINE 5; 5 MG/ML; UG/ML
INJECTION, SOLUTION EPIDURAL; INTRACAUDAL; PERINEURAL
Status: COMPLETED | OUTPATIENT
Start: 2025-05-06 | End: 2025-05-06

## 2025-05-06 RX ORDER — LIDOCAINE HYDROCHLORIDE 20 MG/ML
INJECTION, SOLUTION EPIDURAL; INFILTRATION; INTRACAUDAL; PERINEURAL AS NEEDED
Status: DISCONTINUED | OUTPATIENT
Start: 2025-05-06 | End: 2025-05-06 | Stop reason: SURG

## 2025-05-06 RX ORDER — BISACODYL 10 MG
10 SUPPOSITORY, RECTAL RECTAL DAILY PRN
Status: DISCONTINUED | OUTPATIENT
Start: 2025-05-06 | End: 2025-05-06 | Stop reason: HOSPADM

## 2025-05-06 RX ORDER — HYDROCODONE BITARTRATE AND ACETAMINOPHEN 7.5; 325 MG/1; MG/1
2 TABLET ORAL EVERY 4 HOURS PRN
Status: DISCONTINUED | OUTPATIENT
Start: 2025-05-06 | End: 2025-05-06 | Stop reason: HOSPADM

## 2025-05-06 RX ORDER — BUPIVACAINE HYDROCHLORIDE AND EPINEPHRINE 5; 5 MG/ML; UG/ML
INJECTION, SOLUTION EPIDURAL; INTRACAUDAL; PERINEURAL
Status: COMPLETED
Start: 2025-05-06 | End: 2025-05-06

## 2025-05-06 RX ORDER — FENTANYL CITRATE 50 UG/ML
INJECTION, SOLUTION INTRAMUSCULAR; INTRAVENOUS
Status: COMPLETED
Start: 2025-05-06 | End: 2025-05-06

## 2025-05-06 RX ORDER — ROCURONIUM BROMIDE 10 MG/ML
INJECTION, SOLUTION INTRAVENOUS AS NEEDED
Status: DISCONTINUED | OUTPATIENT
Start: 2025-05-06 | End: 2025-05-06 | Stop reason: SURG

## 2025-05-06 RX ORDER — SODIUM CHLORIDE 9 MG/ML
40 INJECTION, SOLUTION INTRAVENOUS AS NEEDED
Status: DISCONTINUED | OUTPATIENT
Start: 2025-05-06 | End: 2025-05-06 | Stop reason: HOSPADM

## 2025-05-06 RX ORDER — ACETAMINOPHEN 500 MG
1000 TABLET ORAL EVERY 6 HOURS
Status: DISCONTINUED | OUTPATIENT
Start: 2025-05-06 | End: 2025-05-06 | Stop reason: HOSPADM

## 2025-05-06 RX ORDER — PROPOFOL 10 MG/ML
VIAL (ML) INTRAVENOUS AS NEEDED
Status: DISCONTINUED | OUTPATIENT
Start: 2025-05-06 | End: 2025-05-06 | Stop reason: SURG

## 2025-05-06 RX ORDER — SODIUM CHLORIDE, SODIUM LACTATE, POTASSIUM CHLORIDE, CALCIUM CHLORIDE 600; 310; 30; 20 MG/100ML; MG/100ML; MG/100ML; MG/100ML
9 INJECTION, SOLUTION INTRAVENOUS CONTINUOUS PRN
Status: DISCONTINUED | OUTPATIENT
Start: 2025-05-06 | End: 2025-05-06 | Stop reason: HOSPADM

## 2025-05-06 RX ORDER — MIDAZOLAM HYDROCHLORIDE 2 MG/2ML
2 INJECTION, SOLUTION INTRAMUSCULAR; INTRAVENOUS ONCE
Status: COMPLETED | OUTPATIENT
Start: 2025-05-06 | End: 2025-05-06

## 2025-05-06 RX ORDER — SODIUM CHLORIDE, SODIUM LACTATE, POTASSIUM CHLORIDE, CALCIUM CHLORIDE 600; 310; 30; 20 MG/100ML; MG/100ML; MG/100ML; MG/100ML
80 INJECTION, SOLUTION INTRAVENOUS CONTINUOUS
Status: DISCONTINUED | OUTPATIENT
Start: 2025-05-06 | End: 2025-05-06 | Stop reason: HOSPADM

## 2025-05-06 RX ORDER — MAGNESIUM HYDROXIDE 1200 MG/15ML
LIQUID ORAL AS NEEDED
Status: DISCONTINUED | OUTPATIENT
Start: 2025-05-06 | End: 2025-05-06 | Stop reason: HOSPADM

## 2025-05-06 RX ORDER — KETOROLAC TROMETHAMINE 15 MG/ML
15 INJECTION, SOLUTION INTRAMUSCULAR; INTRAVENOUS EVERY 6 HOURS
Status: DISCONTINUED | OUTPATIENT
Start: 2025-05-06 | End: 2025-05-06 | Stop reason: HOSPADM

## 2025-05-06 RX ORDER — FENTANYL CITRATE 50 UG/ML
INJECTION, SOLUTION INTRAMUSCULAR; INTRAVENOUS AS NEEDED
Status: DISCONTINUED | OUTPATIENT
Start: 2025-05-06 | End: 2025-05-06 | Stop reason: SURG

## 2025-05-06 RX ORDER — EPHEDRINE SULFATE 50 MG/ML
INJECTION INTRAVENOUS AS NEEDED
Status: DISCONTINUED | OUTPATIENT
Start: 2025-05-06 | End: 2025-05-06 | Stop reason: SURG

## 2025-05-06 RX ORDER — ONDANSETRON 2 MG/ML
4 INJECTION INTRAMUSCULAR; INTRAVENOUS ONCE AS NEEDED
Status: DISCONTINUED | OUTPATIENT
Start: 2025-05-06 | End: 2025-05-06 | Stop reason: HOSPADM

## 2025-05-06 RX ORDER — SODIUM CHLORIDE 0.9 % (FLUSH) 0.9 %
10 SYRINGE (ML) INJECTION EVERY 12 HOURS SCHEDULED
Status: DISCONTINUED | OUTPATIENT
Start: 2025-05-06 | End: 2025-05-06 | Stop reason: HOSPADM

## 2025-05-06 RX ORDER — OXYCODONE HYDROCHLORIDE 5 MG/1
5 TABLET ORAL
Status: DISCONTINUED | OUTPATIENT
Start: 2025-05-06 | End: 2025-05-06 | Stop reason: HOSPADM

## 2025-05-06 RX ORDER — SODIUM CHLORIDE 0.9 % (FLUSH) 0.9 %
10 SYRINGE (ML) INJECTION AS NEEDED
Status: DISCONTINUED | OUTPATIENT
Start: 2025-05-06 | End: 2025-05-06 | Stop reason: HOSPADM

## 2025-05-06 RX ORDER — HYDROCODONE BITARTRATE AND ACETAMINOPHEN 7.5; 325 MG/1; MG/1
1 TABLET ORAL EVERY 4 HOURS PRN
Status: DISCONTINUED | OUTPATIENT
Start: 2025-05-06 | End: 2025-05-06 | Stop reason: HOSPADM

## 2025-05-06 RX ORDER — BISACODYL 5 MG/1
10 TABLET, DELAYED RELEASE ORAL DAILY PRN
Status: DISCONTINUED | OUTPATIENT
Start: 2025-05-06 | End: 2025-05-06 | Stop reason: HOSPADM

## 2025-05-06 RX ORDER — GLYCOPYRROLATE 0.2 MG/ML
0.2 INJECTION INTRAMUSCULAR; INTRAVENOUS ONCE
Status: COMPLETED | OUTPATIENT
Start: 2025-05-06 | End: 2025-05-06

## 2025-05-06 RX ADMIN — FENTANYL CITRATE 50 MCG: 50 INJECTION, SOLUTION INTRAMUSCULAR; INTRAVENOUS at 08:37

## 2025-05-06 RX ADMIN — SODIUM CHLORIDE, POTASSIUM CHLORIDE, SODIUM LACTATE AND CALCIUM CHLORIDE 9 ML/HR: 600; 310; 30; 20 INJECTION, SOLUTION INTRAVENOUS at 07:45

## 2025-05-06 RX ADMIN — HYDROMORPHONE HYDROCHLORIDE 0.5 MG: 1 INJECTION, SOLUTION INTRAMUSCULAR; INTRAVENOUS; SUBCUTANEOUS at 09:26

## 2025-05-06 RX ADMIN — TRANEXAMIC ACID 1000 MG: 10 INJECTION, SOLUTION INTRAVENOUS at 07:45

## 2025-05-06 RX ADMIN — SODIUM CHLORIDE 3 G: 9 INJECTION, SOLUTION INTRAVENOUS at 15:53

## 2025-05-06 RX ADMIN — SODIUM CHLORIDE 3 G: 9 INJECTION, SOLUTION INTRAVENOUS at 08:23

## 2025-05-06 RX ADMIN — ONDANSETRON 4 MG: 2 INJECTION INTRAMUSCULAR; INTRAVENOUS at 09:26

## 2025-05-06 RX ADMIN — Medication 10 ML: at 12:07

## 2025-05-06 RX ADMIN — PROPOFOL 150 MG: 10 INJECTION, EMULSION INTRAVENOUS at 08:17

## 2025-05-06 RX ADMIN — FENTANYL CITRATE 50 MCG: 50 INJECTION, SOLUTION INTRAMUSCULAR; INTRAVENOUS at 08:17

## 2025-05-06 RX ADMIN — MIDAZOLAM HYDROCHLORIDE 2 MG: 1 INJECTION, SOLUTION INTRAMUSCULAR; INTRAVENOUS at 07:45

## 2025-05-06 RX ADMIN — EPHEDRINE SULFATE 10 MG: 50 INJECTION INTRAVENOUS at 08:36

## 2025-05-06 RX ADMIN — ROCURONIUM BROMIDE 30 MG: 10 INJECTION, SOLUTION INTRAVENOUS at 08:45

## 2025-05-06 RX ADMIN — FENTANYL CITRATE 100 MCG: 50 INJECTION, SOLUTION INTRAMUSCULAR; INTRAVENOUS at 07:56

## 2025-05-06 RX ADMIN — EPHEDRINE SULFATE 10 MG: 50 INJECTION INTRAVENOUS at 09:24

## 2025-05-06 RX ADMIN — ACETAMINOPHEN 1000 MG: 500 TABLET ORAL at 15:01

## 2025-05-06 RX ADMIN — ONDANSETRON 4 MG: 2 INJECTION INTRAMUSCULAR; INTRAVENOUS at 13:03

## 2025-05-06 RX ADMIN — ROCURONIUM BROMIDE 50 MG: 10 INJECTION, SOLUTION INTRAVENOUS at 08:17

## 2025-05-06 RX ADMIN — GLYCOPYRROLATE 0.2 MG: 0.2 INJECTION INTRAMUSCULAR; INTRAVENOUS at 07:45

## 2025-05-06 RX ADMIN — KETOROLAC TROMETHAMINE 15 MG: 15 INJECTION, SOLUTION INTRAMUSCULAR; INTRAVENOUS at 12:07

## 2025-05-06 RX ADMIN — DEXAMETHASONE SODIUM PHOSPHATE 8 MG: 4 INJECTION, SOLUTION INTRA-ARTICULAR; INTRALESIONAL; INTRAMUSCULAR; INTRAVENOUS; SOFT TISSUE at 08:23

## 2025-05-06 RX ADMIN — DEXAMETHASONE SODIUM PHOSPHATE 4 MG: 4 INJECTION, SOLUTION INTRAMUSCULAR; INTRAVENOUS at 07:59

## 2025-05-06 RX ADMIN — SUGAMMADEX 200 MG: 100 INJECTION, SOLUTION INTRAVENOUS at 09:41

## 2025-05-06 RX ADMIN — ACETAMINOPHEN 1000 MG: 500 TABLET ORAL at 07:44

## 2025-05-06 RX ADMIN — BUPIVACAINE HYDROCHLORIDE AND EPINEPHRINE BITARTRATE 30 ML: 5; .005 INJECTION, SOLUTION EPIDURAL; INTRACAUDAL; PERINEURAL at 07:59

## 2025-05-06 RX ADMIN — OXYCODONE 5 MG: 5 TABLET ORAL at 10:12

## 2025-05-06 RX ADMIN — SODIUM CHLORIDE, POTASSIUM CHLORIDE, SODIUM LACTATE AND CALCIUM CHLORIDE 80 ML/HR: 600; 310; 30; 20 INJECTION, SOLUTION INTRAVENOUS at 12:07

## 2025-05-06 RX ADMIN — TRANEXAMIC ACID 1000 MG: 10 INJECTION, SOLUTION INTRAVENOUS at 09:15

## 2025-05-06 RX ADMIN — LIDOCAINE HYDROCHLORIDE 100 MG: 20 INJECTION, SOLUTION EPIDURAL; INFILTRATION; INTRACAUDAL; PERINEURAL at 08:17

## 2025-05-06 NOTE — THERAPY EVALUATION
Acute Care - Physical Therapy Initial Evaluation   Syed     Patient Name: Malik Abbasi  : 1965  MRN: [id number]  Today's Date: 2025     Admit date: 2025     Referring Physician: Fred Chang MD     Surgery Date:2025   Procedure(s) (LRB):  LEFT TOTAL KNEE ARTHROPLASTY WITH DOUG ROBOT (Left)          Visit Dx:     ICD-10-CM ICD-9-CM   1. Impaired mobility and ADLs  Z74.09 V49.89    Z78.9    2. Osteoarthritis of left knee, unspecified osteoarthritis type  M17.12 715.96   3. Difficulty in walking  R26.2 719.7     Patient Active Problem List   Diagnosis    Radiculopathy of arm    Peripheral neuropathy, idiopathic    Chronic pain of both knees    Gastroesophageal reflux disease without esophagitis    Atrial flutter    Osteoarthritis of left knee    Primary osteoarthritis of left knee     Past Medical History:   Diagnosis Date    A-fib     Ankle sprain     Arthritis     CHF (congestive heart failure)     Coronary artery disease     CTS (carpal tunnel syndrome)     Elevated cholesterol     Fatty liver     GERD (gastroesophageal reflux disease)     Hyperlipidemia     Hypertension     Irregular heartbeat     Knee swelling     Rotator cuff syndrome     Sleep apnea     cpap    Tear of meniscus of knee      Past Surgical History:   Procedure Laterality Date    CARDIOVERSION      when pt was in afib    CHOLECYSTECTOMY  2006    ENDOSCOPY N/A 2022    Procedure: ESOPHAGOGASTRODUODENOSCOPY WITH BIOPSIES;  Surgeon: Reese Mcintyre MD;  Location: [facility] ENDOSCOPY;  Service: General;  Laterality: N/A;  NORMAL COLON    HAND SURGERY      Did not work    KNEE ARTHROSCOPY Left     ROTATOR CUFF REPAIR Right 2019    SHOULDER SURGERY      TRICEP TENDON REPAIR Right 2019     PT Assessment (Last 12 Hours)       PT Evaluation and Treatment       Row Name 25 1335          Physical Therapy Time and Intention    Subjective Information no complaints  -SALLY     Document Type evaluation  -SALLY      Mode of Treatment individual therapy;physical therapy  -SALLY     Patient Effort good  -SALLY     Symptoms Noted During/After Treatment none  -SALLY       Row Name 05/06/25 1335          General Information    Patient Observations alert;cooperative;agree to therapy  -SALLY     Prior Level of Function independent:;all household mobility;community mobility  -SALLY     Equipment Currently Used at Home none  -SALLY     Existing Precautions/Restrictions fall;weight bearing  -SALLY     Barriers to Rehab none identified  -SALLY       Row Name 05/06/25 1335          Living Environment    Current Living Arrangements home  -SALLY     People in Home spouse  -SALLY       Row Name 05/06/25 1335          Cognition    Orientation Status (Cognition) oriented x 3  -SALLY       Row Name 05/06/25 1335          Range of Motion Comprehensive    General Range of Motion lower extremity range of motion deficits identified  LLE 5-90°  -SALLY       Row Name 05/06/25 1335          Strength Comprehensive (MMT)    General Manual Muscle Testing (MMT) Assessment lower extremity strength deficits identified  LLE 4-/5  -SALLY       Row Name 05/06/25 1335          Mobility    Extremity Weight-bearing Status left lower extremity  -SALLY     Left Lower Extremity (Weight-bearing Status) weight-bearing as tolerated (WBAT)  -SALLY       Row Name 05/06/25 1335          Bed Mobility    Bed Mobility bed mobility (all) activities;supine-sit  -SALLY     All Activities, [place] (Bed Mobility) contact guard  -SALLY     Supine-Sit [place] (Bed Mobility) contact guard  -SALLY       Row Name 05/06/25 1335          Transfers    Transfers bed-chair transfer;sit-stand transfer  -SALLY       Row Name 05/06/25 1335          Bed-Chair Transfer    Bed-Chair [place] (Transfers) contact guard  -SALLY     Assistive Device (Bed-Chair Transfers) walker, front-wheeled  -SALLY       Row Name 05/06/25 1335          Sit-Stand Transfer    Sit-Stand [place] (Transfers) contact guard  -SALLY     Assistive Device (Sit-Stand  Transfers) walker, front-wheeled  -SALLY       Row Name 05/06/25 1335          Gait/Stairs (Locomotion)    Gait/Stairs Locomotion gait/ambulation assistive device  -SALLY     [place] Level (Gait) contact guard  -SALLY     Assistive Device (Gait) walker, front-wheeled  -SALLY     Patient was able to Ambulate yes  -SALLY     Distance in Feet (Gait) 20  -SALLY     Pattern (Gait) step-to  -SALLY       Row Name 05/06/25 1335          Safety Issues/Impairments Affecting Functional Mobility    Impairments Affecting Function (Mobility) balance;pain;range of motion (ROM);strength  -SALLY       Row Name 05/06/25 1335          Balance    Balance Assessment standing dynamic balance  -SALLY     Dynamic Standing Balance contact guard  -SALLY     Position/Device Used, Standing Balance walker, front-wheeled  -SALLY       Row Name             Wound 05/06/25 Left anterior knee Surgical Closed Surgical Incision    Wound - Properties Group Placement Date: 05/06/25  -AG Side: Left  -AG Orientation: anterior  -AG Location: knee  -AG Primary Wound Type: Surgical  -AG Secondary Wound Type - Surgical: Closed Surgi  -AG    Retired Wound - Properties Group Placement Date: 05/06/25  -AG Side: Left  -AG Orientation: anterior  -AG Location: knee  -AG    Retired Wound - Properties Group Placement Date: 05/06/25  -AG Side: Left  -AG Orientation: anterior  -AG Location: knee  -AG    Retired Wound - Properties Group Date first assessed: 05/06/25  -AG Side: Left  -AG Location: knee  -AG      Row Name             Wound 05/06/25 Left lower leg Surgical Closed Surgical Incision    Wound - Properties Group Placement Date: 05/06/25  -AG Side: Left  -AG Orientation: lower  -AG Location: leg  -AG Primary Wound Type: Surgical  -AG, pin insertion sites for DOUG robot procedure  Secondary Wound Type - Surgical: Closed Surgi  -AG    Retired Wound - Properties Group Placement Date: 05/06/25  -AG Side: Left  -AG Orientation: lower  -AG Location: leg  -AG    Retired Wound - Properties Group  Placement Date: 05/06/25  -AG Side: Left  -AG Orientation: lower  -AG Location: leg  -AG    Retired Wound - Properties Group Date first assessed: 05/06/25  -AG Side: Left  -AG Location: leg  -AG      Row Name 05/06/25 7824          Plan of Care Review    Plan of Care Reviewed With patient  -SALLY     Outcome Evaluation Patient presents with decreased strength, transfers and ambulation.  Skilled physical therapy services will be required to address these mobility deficits.  -SALLY       Row Name 05/06/25 9176          Therapy Assessment/Plan (PT)    Patient/Family Therapy Goals Statement (PT) Walk without pain  -SALLY     Rehab Potential (PT) good  -SALLY     Criteria for Skilled Interventions Met (PT) skilled treatment is necessary  -SALLY     Therapy Frequency (PT) 2 times/day  -SALLY     Predicted Duration of Therapy Intervention (PT) 10 days  -SALLY     Problem List (PT) problems related to;balance;mobility;range of motion (ROM);strength;pain  -SALLY     Activity Limitations Related to Problem List (PT) unable to ambulate safely;unable to transfer safely  -SALLY       Row Name 05/06/25 8014          PT Evaluation Complexity    History, PT Evaluation Complexity no personal factors and/or comorbidities  -SALLY     Examination of Body Systems (PT Eval Complexity) total of 4 or more elements  -SALLY     Clinical Presentation (PT Evaluation Complexity) stable  -SALLY     Clinical Decision Making (PT Evaluation Complexity) low complexity  -SALLY     Overall Complexity (PT Evaluation Complexity) low complexity  -SALLY       Row Name 05/06/25 0421          Therapy Plan Review/Discharge Plan (PT)    Therapy Plan Review (PT) evaluation/treatment results reviewed;participants voiced agreement with care plan;participants included;patient  -SALLY       Row Name 05/06/25 7626          Physical Therapy Goals    Transfer Goal Selection (PT) transfer, PT goal 1  -SALLY     Gait Training Goal Selection (PT) gait training, PT goal 1  -SALLY     ROM Goal Selection (PT) ROM, PT goal  1  -SALLY     Strength Goal Selection (PT) strength, PT goal 1  -SALLY       Row Name 05/06/25 1335          Transfer Goal 1 (PT)    Activity/Assistive Device (Transfer Goal 1, PT) transfers, all  -SALLY     [place] Level/Cues Needed (Transfer Goal 1, PT) independent  -SALLY     Time Frame (Transfer Goal 1, PT) long term goal (LTG);10 days  -SALLY       Row Name 05/06/25 1335          Gait Training Goal 1 (PT)    Activity/Assistive Device (Gait Training Goal 1, PT) gait (walking locomotion);walker, rolling  -SLALY     [place] Level (Gait Training Goal 1, PT) independent  -SALLY     Distance (Gait Training Goal 1, PT) 300  -SALLY     Time Frame (Gait Training Goal 1, PT) long term goal (LTG);10 days  -SALLY       Row Name 05/06/25 2655          ROM Goal 1 (PT)    ROM Goal 1 (PT) Pt will demonstrate knee ROM from 0-110° on the affected side.  -SALLY     Time Frame (ROM Goal 1, PT) long-term goal (LTG);10 days  -SALLY       Row Name 05/06/25 5673          Strength Goal 1 (PT)    Strength Goal 1 (PT) Pt will demonstrate knee extension strength of 5/5 on the affected side.  -SALLY     Time Frame (Strength Goal 1, PT) long term goal (LTG);10 days  -SALLY               User Key  (r) = Recorded By, (t) = Taken By, (c) = Cosigned By      Initials Name Provider Type    Araceli Juarez RN Registered Nurse    Iván Bustillo, PT Physical Therapist                      PT Recommendation and Plan  Anticipated Discharge Disposition (PT): home with outpatient therapy services  Planned Therapy Interventions (PT): balance training, bed mobility training, gait training, home exercise program, stair training, ROM (range of motion), strengthening, stretching, transfer training  Therapy Frequency (PT): 2 times/day  Plan of Care Reviewed With: patient  Outcome Evaluation: Patient presents with decreased strength, transfers and ambulation.  Skilled physical therapy services will be required to address these mobility deficits.   Outcome Measures       Row Name  05/06/25 1328             How much help from another person do you currently need...    Turning from your back to your side while in flat bed without using bedrails? 4  -SALLY      Moving from lying on back to sitting on the side of a flat bed without bedrails? 4  -SALLY      Moving to and from a bed to a chair (including a wheelchair)? 3  -SALLY      Standing up from a chair using your arms (e.g., wheelchair, bedside chair)? 3  -SALLY      Climbing 3-5 steps with a railing? 3  -SALLY      To walk in hospital room? 3  -SALLY      AM-PAC 6 Clicks Score (PT) 20  -SALLY                User Key  (r) = Recorded By, (t) = Taken By, (c) = Cosigned By      Initials Name Provider Type    Iván Bustillo, PT Physical Therapist                     Time Calculation:    PT Charges       Row Name 05/06/25 1341             Time Calculation    PT Received On 05/06/25  -SALLY      PT Goal Re-Cert Due Date 05/15/25  -SALLY         Untimed Charges    PT Eval/Re-eval Minutes 20  -SALLY         Total Minutes    Untimed Charges Total Minutes 20  -SALLY       Total Minutes 20  -SALLY                User Key  (r) = Recorded By, (t) = Taken By, (c) = Cosigned By      Initials Name Provider Type    Iván Bustillo, PT Physical Therapist                      PT G-Codes  Outcome Measure Options: AM-PAC 6 Clicks Daily Activity (OT), Optimal Instrument  AM-PAC 6 Clicks Score (PT): 20  AM-PAC 6 Clicks Score (OT): 21    Iván Landaverde, PT  5/6/2025

## 2025-05-06 NOTE — ANESTHESIA PREPROCEDURE EVALUATION
Anesthesia Evaluation     Patient summary reviewed and Nursing notes reviewed   no history of anesthetic complications:   NPO Solid Status: > 8 hours  NPO Liquid Status: > 2 hours           Airway   Mallampati: III  TM distance: >3 FB  Neck ROM: full  Large neck circumference and Difficult intubation highly probable  Dental - normal exam     Pulmonary - negative pulmonary ROS and normal exam    breath sounds clear to auscultation  (+) ,sleep apnea (Severe) on CPAP  Cardiovascular - negative cardio ROS and normal exam  Exercise tolerance: good (4-7 METS)    ECG reviewed  Patient on routine beta blocker and Beta blocker given within 24 hours of surgery  Rhythm: regular  Rate: normal    (+) hypertension (metoprolol, amlodipine), CAD, dysrhythmias (s/p ablation) Atrial Fib, Atrial Flutter, CHF Diastolic >=55%, hyperlipidemia    ROS comment: Echo (24; arrhythmia):  ·  Left ventricular ejection fraction appears to be 51 - 55%.  ·  Left ventricular wall thickness is consistent with mild concentric hypertrophy.  ·  Left ventricular diastolic function was indeterminate.    EK bpm  Sinus bradycardia  Atrial premature complexes  Borderline  prolonged TX interval  Nonspecific  intraventricular conduction delay  Inferior  infarct, old  Consider  anterior infarct  When compared with ECG of 12-Sep-2024 10:40:53,  Significant rate decrease  New conduction abnormality  Electronically Signed By: George Trujillo ([facility], [place]) 2025 21:33:34  Date and Time of Study:2025 11:46:02      Neuro/Psych- negative ROS  (+) numbness  GI/Hepatic/Renal/Endo - negative ROS   (+) morbid obesity, GERD well controlled, liver disease fatty liver disease    Musculoskeletal     Abdominal   (+) obese   Substance History - negative use     OB/GYN negative ob/gyn ROS         Other - negative ROS  arthritis,     ROS/Med Hx Other: <4METS.SOAE,HX CHF,HTN,MANDA,HLD,GERD,GOUT,A FLUTTER/FIB-CARDIOVERSION 24,TTE 24 EF 51-55%,NO VALVE  STENOSIS, STRESS 12/20/21 NO ISCHEMIA, CARDS OV 3/5/25. EKG REVIEW. KT                     Anesthesia Plan    ASA 3     general, regional and ERAS Protocol     (Patient understands anesthesia not responsible for dental damage. Regional anesthesia options discussed with patient. Pt accepts regional block.)  intravenous induction     Anesthetic plan, risks, benefits, and alternatives have been provided, discussed and informed consent has been obtained with: patient and spouse/significant other.    Use of blood products discussed with patient .    Plan discussed with CRNA.        CODE STATUS:

## 2025-05-06 NOTE — SIGNIFICANT NOTE
05/06/25 1408   Plan   Final Discharge Disposition Code 01 - home or self-care   Final Note LISHA Cardenas. Appt: 05/08/25 at 1PM.

## 2025-05-06 NOTE — SIGNIFICANT NOTE
05/06/25 1408   Plan   Plan Comments  --    Final Discharge Disposition Code 01 - home or self-care   Final Note LISHA Cardenas. Appt: 05/08/25 at 8:45AM.

## 2025-05-06 NOTE — H&P
"Chief Complaint  Initial Evaluation of the Left Knee     Subjective  Malik Abbasi presents to [facility] for initial evaluation of the left knee. He has pain in the knee.  He has a lot of start up pain.  He has had an arthroscopy in the past.  He has pain with prolonged standing, ambulation and stairs.  He notes the pain is affecting his daily tasks and ADL's.  He has decrease ROM with extension.  He has pain with prolonged standing, ambulation and stairs.       Allergies         Allergies   Allergen Reactions    Codeine Other (See Comments)       Syncope            Social History   Social History            Socioeconomic History    Marital status:    Tobacco Use    Smoking status: Former       Current packs/day: 0.00       Types: Cigarettes       Quit date: 2006       Years since quittin.4    Smokeless tobacco: Never   Vaping Use    Vaping status: Never Used   Substance and Sexual Activity    Alcohol use: Yes       Comment: rarely    Drug use: No    Sexual activity: Defer            I reviewed the patient's chief complaint, history of present illness, review of systems, past medical history, surgical history, family history, social history, medications, and allergy list.      Review of Systems      Constitutional: Denies fevers, chills, weight loss  Cardiovascular: Denies chest pain, shortness of breath  Skin: Denies rashes, acute skin changes  Neurologic: Denies headache, loss of consciousness           Vital Signs:   /80   Pulse 50   Ht 182.9 cm (72\")   Wt (!) 147 kg (324 lb 12.8 oz)   SpO2 95%   BMI 44.05 kg/m²           Physical Exam  General: Alert. No acute distress     Ortho Exam          LEFT KNEE Flexion 100. Extension -8. Stable to varus/valgus stress. Stable to anterior/posterior drawer. Neurovascularly intact. Negative Sun. Negative Lachman. Positive EHL, FHL, HS and TA. Sensation intact to light touch all 5 nerves of the foot. " Ambulates with Antalgic gait. Patella is well tracking. Calf supple, non-tender. Positive tenderness to the medial joint line. Positive tenderness to the lateral joint line. Positive Crepitus. Good strength to hamstrings, quadriceps, dorsiflexors, and plantar flexors.  Knee Extensor Mechanism intact Mild swelling.  Kellgren Hal grading scale is a 4.           Procedures           Imaging Results (Most Recent)         Procedure Component Value Units Date/Time     XR Knee Standing Left [[id number]] Resulted: 03/05/25 1504       Updated: 03/05/25 1509                Result Review  :      X-Ray Report:  Left knee X-Ray  Indication: Evaluation of the left knee  AP/Lateral and [place] view(s)  Findings: Moderately advanced degenerative arthritis of the left knee.    Prior studies available for comparison: no            Assessment  Assessment and Plan      Diagnoses and all orders for this visit:     1. Left knee pain, unspecified chronicity (Primary)  -     XR Knee Standing Left     2. Osteoarthritis of left knee, unspecified osteoarthritis type     3. BMI 40.0-44.9, adult           Discussed the treatment plan with the patient. I reviewed the X-rays that were obtained today with the patient.      Discussed the treatment options with the patient, operative vs non-operative. The patient expressed understanding and wished to proceed with a left total knee arthroplasty.          Educated on risk of elevated BMI.  Discussed options for weight loss/decreasing BMI prior to procedure including dietician consult, weight loss options and exercise program., Discussed surgery., Risks/benefits discussed with patient including, but not limited to: infection, bleeding, neurovascular damage, re-rupture, aesthetic deformity, need for further surgery, and death., Discussed with patient the implant type being used during surgery and patient understands., Surgery pamphlet given., Call or return if worsening symptoms., DME order for a 3  in 1 given today due to patient will be confined to one room/level of the home that does not offer a toilet during postop recovery. , I am prescribing  the Beka® Pro2.0 sustained acoustic medicine device for the acceleration of soft tissue repair and reduction of pain. , I am ordering the use of the Nice1 cold therapy machine for 60 days post-op as part of an opioid-sparing approach to help manage pain and edema.  I feel this is medically necessary for the best care for this patient.   , IFC can help extend pain relief and hopefully reduce need for pain medication. TENS is used to control break through pain. NMES is used to help and strengthen weak muscles and prevent atrophy., and Patient does not have any metal allergies.      Follow Up      2 weeks postoperatively

## 2025-05-06 NOTE — ANESTHESIA PROCEDURE NOTES
Peripheral Block    Pre-sedation assessment completed: 5/6/2025 7:40 AM    Patient reassessed immediately prior to procedure    Patient location during procedure: pre-op  Start time: 5/6/2025 7:56 AM  Stop time: 5/6/2025 7:59 AM  Reason for block: at surgeon's request and post-op pain management  Preanesthetic Checklist  Completed: patient identified, IV checked, site marked, risks and benefits discussed, surgical consent, monitors and equipment checked, pre-op evaluation and timeout performed  Prep:  Pt Position: supine  Sterile barriers:alcohol skin prep, partial drape, cap, washed/disinfected hands, mask and gloves  Prep: ChloraPrep  Patient monitoring: blood pressure monitoring, continuous pulse oximetry and EKG  Procedure    Sedation: yes  Performed under: local infiltration  Guidance:ultrasound guided and nerve stimulator    ULTRASOUND INTERPRETATION.  Using ultrasound guidance a 20 G gauge needle was placed in close proximity to the nerve, at which point, under ultrasound guidance anesthetic was injected in the area of the nerve and spread of the anesthesia was seen on ultrasound in close proximity thereto.  There were no abnormalities seen on ultrasound; a digital image was taken; and the patient tolerated the procedure with no complications. Images:still images obtained, printed/placed on chart    Laterality:left  Block Type:adductor canal block  Injection Technique:single-shot  Needle Type:echogenic  Needle Gauge:20 G (4in)  Resistance on Injection: none    Medications Used: dexamethasone (DECADRON) injection 4 mg/mL - Injection   4 mg - 5/6/2025 7:59:00 AM  bupivacaine-EPINEPHrine PF (MARCAINE w/EPI) 0.5% -[id number] injection - Injection   30 mL - 5/6/2025 7:59:00 AM      Post Assessment  Injection Assessment: negative aspiration for heme, no paresthesia on injection and incremental injection  Patient Tolerance:comfortable throughout block  Complications:no  Additional Notes  The block or continuous  infusion is requested by the referring physician for management of postoperative pain, or pain related to a procedure. Ultrasound guidance (deemed medically necessary). Painless injection, pt was awake and conversant during the procedure without complications. Needle and surrounding structures visualized throughout procedure. No adverse reactions or complications seen during this period. Post-procedure image showed no signs of complication, and anatomy was consistent with an uncomplicated nerve blockade.  Performed by: Haylee Oconnell MD

## 2025-05-06 NOTE — THERAPY EVALUATION
Patient Name: Malik Abbasi  : 1965    MRN: [id number]                              Today's Date: 2025       Admit Date: 2025    Visit Dx:     ICD-10-CM ICD-9-CM   1. Impaired mobility and ADLs  Z74.09 V49.89    Z78.9    2. Osteoarthritis of left knee, unspecified osteoarthritis type  M17.12 715.96     Patient Active Problem List   Diagnosis    Radiculopathy of arm    Peripheral neuropathy, idiopathic    Chronic pain of both knees    Gastroesophageal reflux disease without esophagitis    Atrial flutter    Osteoarthritis of left knee    Primary osteoarthritis of left knee     Past Medical History:   Diagnosis Date    A-fib     Ankle sprain     Arthritis     CHF (congestive heart failure)     Coronary artery disease     CTS (carpal tunnel syndrome)     Elevated cholesterol     Fatty liver     GERD (gastroesophageal reflux disease)     Hyperlipidemia     Hypertension     Irregular heartbeat     Knee swelling     Rotator cuff syndrome     Sleep apnea     cpap    Tear of meniscus of knee      Past Surgical History:   Procedure Laterality Date    CARDIOVERSION      when pt was in afib    CHOLECYSTECTOMY      ENDOSCOPY N/A 2022    Procedure: ESOPHAGOGASTRODUODENOSCOPY WITH BIOPSIES;  Surgeon: Reese Mcintyre MD;  Location: [facility] ENDOSCOPY;  Service: General;  Laterality: N/A;  NORMAL COLON    HAND SURGERY      Did not work    KNEE ARTHROSCOPY Left     ROTATOR CUFF REPAIR Right 2019    SHOULDER SURGERY      TRICEP TENDON REPAIR Right 2019      General Information       Row Name 25 1330 25 1318       OT Time and Intention    Document Type therapy note (daily note)  -AC evaluation  -AC    Mode of Treatment individual therapy;occupational therapy  -AC individual therapy;occupational therapy  -AC    Patient Effort good  -AC good  -AC    Comment patient was very sweaty, making comments about amount of sweat and pulse monitor would not stick, nursing notified  -AC --       Row Name 05/06/25 1330 05/06/25 1318       General Information    Patient Profile Reviewed yes  -AC yes  -AC    Prior Level of Function -- --  patient reports (I) with adls and functional mobility without an AD. He states he has a walk in shower with seat option and regular toilet in place.  -AC    Existing Precautions/Restrictions fall  -AC fall  -AC    Barriers to Rehab -- none identified  -AC      Row Name 05/06/25 1318          Occupational Profile    Reason for Services/Referral (Occupational Profile) Patient is a 60 year old male admitted for the above diagnosis s/p Left total knee replacement on 5/6/2025. Occupational therapy ordered to assess patient safety and independence with adls and adl transfers/mobility. Patient is not currently receiving OT services for the above diagnosis.  -AC       Row Name 05/06/25 1318          Living Environment    Current Living Arrangements home  -AC     People in Home spouse  -AC       Row Name 05/06/25 1318          Home Main Entrance    Number of Stairs, Main Entrance two  -AC       Row Name 05/06/25 1330 05/06/25 1318       Cognition    Orientation Status (Cognition) oriented x 3  -AC oriented x 3  -AC      Row Name 05/06/25 1330 05/06/25 1318       Safety Issues/Impairments Affecting Functional Mobility    Impairments Affecting Function (Mobility) balance;endurance/activity tolerance;pain  -AC balance;endurance/activity tolerance;pain  -AC              User Key  (r) = Recorded By, (t) = Taken By, (c) = Cosigned By      Initials Name Provider Type    AC Sarah Zepeda, OT Occupational Therapist                     Mobility/ADL's       Row Name 05/06/25 1322          Bed Mobility    Comment, (Bed Mobility) martina seated in recliner upon OT arrival in the room.  -AC       Row Name 05/06/25 1331 05/06/25 1322       Transfers    Transfers sit-stand transfer  -AC sit-stand transfer  -AC      Row Name 05/06/25 1331 05/06/25 1322       Sit-Stand Transfer    Sit-Stand  [place] (Transfers) contact guard;1 person assist;verbal cues  -AC contact guard;1 person assist  -    Assistive Device (Sit-Stand Transfers) -- walker, front-wheeled  -AC      Row Name 05/06/25 1331 05/06/25 1322       Functional Mobility    Functional Mobility- Ind. Level contact guard assist;1 person  -AC contact guard assist;1 person  -    Functional Mobility- Device walker, front-wheeled  -AC walker, front-wheeled  -AC    Functional Mobility- Comment patient was CGA with rolling walker for for stepping in front of recliner with cues for safety and technique.  -AC --      Row Name 05/06/25 1331 05/06/25 1322       Activities of Daily Living    BADL Assessment/Intervention lower body dressing  Patient is setup for upper body bathing/dressing, setup for grooming, setup for self-feeding, CGA for lower body bathing/dressing, CGA for toileting.  -AC --  Patient is setup for upper body bathing/dressing, setup for grooming, setup for self-feeding, CGA for lower body bathing/dressing, CGA for toileting.  -      Row Name 05/06/25 1331 05/06/25 1322       Mobility    Extremity Weight-bearing Status left lower extremity  - left lower extremity  -    Left Lower Extremity (Weight-bearing Status) weight-bearing as tolerated (WBAT)  - weight-bearing as tolerated (WBAT)  -      Row Name 05/06/25 1331          Lower Body Dressing Assessment/Training    [place] Level (Lower Body Dressing) lower body dressing skills;doff;don;socks;set up  -     Position (Lower Body Dressing) unsupported sitting  -               User Key  (r) = Recorded By, (t) = Taken By, (c) = Cosigned By      Initials Name Provider Type     Sarah Zepeda OT Occupational Therapist                   Obj/Interventions       Row Name 05/06/25 1324          Sensory Assessment (Somatosensory)    Sensory Assessment (Somatosensory) UE sensation intact  -[facility] Name 05/06/25 1324          Vision Assessment/Intervention    Visual  Impairment/Limitations WFL  -[facility] Name 05/06/25 1324          Range of Motion Comprehensive    General Range of Motion bilateral upper extremity ROM WFL  -[facility] Name 05/06/25 1324          Strength Comprehensive (MMT)    General Manual Muscle Testing (MMT) Assessment no strength deficits identified  -[facility] Name 05/06/25 1324          Motor Skills    Motor Skills coordination;functional endurance  -     Coordination WFL  -     Functional Endurance fair/fair plus for adls  -AC       Row Name 05/06/25 1335 05/06/25 1324       Balance    Balance Assessment -- standing dynamic balance  -    Dynamic Standing Balance -- contact guard;1-person assist  -AC    Position/Device Used, Standing Balance -- supported;walker, front-wheeled  -    Balance Interventions standing;sit to stand;supported;dynamic;minimal challenge;occupation based/functional task  - --              User Key  (r) = Recorded By, (t) = Taken By, (c) = Cosigned By      Initials Name Provider Type    AC Sarah Zepeda OT Occupational Therapist                   Goals/Plan       Row Name 05/06/25 1327          Bed Mobility Goal 1 (OT)    Activity/Assistive Device (Bed Mobility Goal 1, OT) bed mobility activities, all  -AC     [place] Level/Cues Needed (Bed Mobility Goal 1, OT) modified independence  -AC     Time Frame (Bed Mobility Goal 1, OT) long term goal (LTG);10 days  -AC       Row Name 05/06/25 1327          Transfer Goal 1 (OT)    Activity/Assistive Device (Transfer Goal 1, OT) transfers, all  -AC     [place] Level/Cues Needed (Transfer Goal 1, OT) modified independence  -AC     Time Frame (Transfer Goal 1, OT) long term goal (LTG);10 days  -AC       Row Name 05/06/25 1327          Bathing Goal 1 (OT)    Activity/Device (Bathing Goal 1, OT) bathing skills, all  -AC     [place] Level/Cues Needed (Bathing Goal 1, OT) modified independence  -AC     Time Frame (Bathing Goal 1, OT) long term goal (LTG);10 days   -       Row Name 05/06/25 1327          Dressing Goal 1 (OT)    Activity/Device (Dressing Goal 1, OT) dressing skills, all  -AC     [place]/Cues Needed (Dressing Goal 1, OT) modified independence  -AC     Time Frame (Dressing Goal 1, OT) long term goal (LTG);10 days  -       Row Name 05/06/25 1327          Problem Specific Goal 1 (OT)    Problem Specific Goal 1 (OT) patient will demonstrate good activity tolerance for adls.  -     Time Frame (Problem Specific Goal 1, OT) long term goal (LTG);10 days  -       Row Name 05/06/25 1327          Therapy Assessment/Plan (OT)    Planned Therapy Interventions (OT) activity tolerance training;functional balance retraining;occupation/activity based interventions;patient/caregiver education/training;transfer/mobility retraining;ROM/therapeutic exercise;BADL retraining  -               User Key  (r) = Recorded By, (t) = Taken By, (c) = Cosigned By      Initials Name Provider Type    AC Sarah Zepeda, OT Occupational Therapist                   Clinical Impression       Row Name 05/06/25 133 05/06/25 1325       Pain Assessment    Pretreatment Pain Rating 0/10 - no pain  -AC 0/10 - no pain  -AC    Posttreatment Pain Rating 0/10 - no pain  -AC 0/10 - no pain  -AC      Row Name 05/06/25 1335 05/06/25 1325       Plan of Care Review    Plan of Care Reviewed With patient  -AC patient  -AC    Progress no change  -AC no change  -AC    Outcome Evaluation Patient instructed in lower body adaptive dressing technique, weightbearing status, joint protection and safety with adl transfers. Patient to continue with therapy services in order to maximize independence with adls.  - patient demonstrate balance and endurance limitations that impact his ability to safely and independently perform adls and adl transfers. The skills of a therapist are necessary in order to implement the following treatment plan.  -      Row Name 05/06/25 1325          Therapy Assessment/Plan (OT)     Patient/Family Therapy Goal Statement (OT) less pain.  -AC     Rehab Potential (OT) good  -AC     Criteria for Skilled Therapeutic Interventions Met (OT) yes;meets criteria;skilled treatment is necessary  -     Therapy Frequency (OT) 5 times/wk  -       Row Name 05/06/25 1325          Therapy Plan Review/Discharge Plan (OT)    Equipment Needs Upon Discharge (OT) walker, rolling;commode chair  -AC     Anticipated Discharge Disposition (OT) home with assist;home with outpatient therapy services  -       Row Name 05/06/25 1325          Positioning and Restraints    Pre-Treatment Position sitting in chair/recliner  -AC     Post Treatment Position chair  -AC     In Chair call light within reach;encouraged to call for assist;exit alarm on;reclined;LLE elevated  -               User Key  (r) = Recorded By, (t) = Taken By, (c) = Cosigned By      Initials Name Provider Type    AC Sarah Zepeda, IMER Occupational Therapist                   Outcome Measures       Row Name 05/06/25 1328          How much help from another is currently needed...    Putting on and taking off regular lower body clothing? 3  -AC     Bathing (including washing, rinsing, and drying) 3  -AC     Toileting (which includes using toilet bed pan or urinal) 3  -AC     Putting on and taking off regular upper body clothing 4  -AC     Taking care of personal grooming (such as brushing teeth) 4  -AC     Eating meals 4  -AC     AM-PAC 6 Clicks Score (OT) 21  -       Row Name 05/06/25 1242          How much help from another person do you currently need...    Turning from your back to your side while in flat bed without using bedrails? 4  -NICOLE     Moving from lying on back to sitting on the side of a flat bed without bedrails? 4  -NICOLE     Moving to and from a bed to a chair (including a wheelchair)? 3  -NICOLE     Standing up from a chair using your arms (e.g., wheelchair, bedside chair)? 3  -NICOLE     Climbing 3-5 steps with a railing? 3  -NICOLE     To walk in  hospital room? 3  -NICOLE     AM-PAC 6 Clicks Score (PT) 20  -NICOLE       Row Name 05/06/25 1328          Functional Assessment    Outcome Measure Options AM-PAC 6 Clicks Daily Activity (OT);Optimal Instrument  -AC       Row Name 05/06/25 1328          Optimal Instrument    Optimal Instrument Optimal - 3  -AC     Bending/Stooping 2  -AC     Standing 2  -AC     Reaching 1  -AC     From the list, choose the 3 activities you would most like to be able to do without any difficulty Bending/stooping;Standing;Reaching  -AC     Total Score Optimal - 3 5  -AC               User Key  (r) = Recorded By, (t) = Taken By, (c) = Cosigned By      Initials Name Provider Type     Sarah Zepeda OT Occupational Therapist    Lena Mauro, RN Registered Nurse                    Occupational Therapy Education       Title: PT OT SLP Therapies (Done)       Topic: Occupational Therapy (Done)       Point: ADL training (Done)       Learning Progress Summary            Patient Acceptance, E,TB,D, VU,DU by  at 5/6/2025 1329                      Point: Home exercise program (Done)       Learning Progress Summary            Patient Acceptance, E,TB,D, VU,DU by  at 5/6/2025 1329                      Point: Precautions (Done)       Learning Progress Summary            Patient Acceptance, E,TB,D, VU,DU by  at 5/6/2025 1329                      Point: Body mechanics (Done)       Learning Progress Summary            Patient Acceptance, E,TB,D, VU,DU by  at 5/6/2025 1329                                      User Key       Initials Effective Dates Name Provider Type Discipline     06/16/21 -  Sarah Zepeda OT Occupational Therapist OT                  OT Recommendation and Plan  Planned Therapy Interventions (OT): activity tolerance training, functional balance retraining, occupation/activity based interventions, patient/caregiver education/training, transfer/mobility retraining, ROM/therapeutic exercise, BADL retraining  Therapy Frequency  (OT): 5 times/wk  Plan of Care Review  Plan of Care Reviewed With: patient  Progress: no change  Outcome Evaluation: Patient instructed in lower body adaptive dressing technique, weightbearing status, joint protection and safety with adl transfers. Patient to continue with therapy services in order to maximize independence with adls.     Time Calculation:   Evaluation Complexity (OT)  Review Occupational Profile/Medical/Therapy History Complexity: expanded/moderate complexity  Assessment, Occupational Performance/Identification of Deficit Complexity: 1-3 performance deficits  Clinical Decision Making Complexity (OT): problem focused assessment/low complexity  Overall Complexity of Evaluation (OT): low complexity     Time Calculation- OT       Row Name 05/06/25 1329             Time Calculation- OT    OT Received On 05/06/25  -AC      OT Goal Re-Cert Due Date 05/15/25  -AC         Timed Charges    86303 - OT Therapeutic Activity Minutes 5  -AC         Untimed Charges    OT Eval/Re-eval Minutes 31  -AC         Total Minutes    Timed Charges Total Minutes 5  -AC      Untimed Charges Total Minutes 31  -AC       Total Minutes 36  -AC                User Key  (r) = Recorded By, (t) = Taken By, (c) = Cosigned By      Initials Name Provider Type    AC Sarah Zepeda OT Occupational Therapist                  Therapy Charges for Today       Code Description Service Date Service Provider Modifiers Qty    [id number] HC OT EVAL LOW COMPLEXITY 3 5/6/2025 Sarah Zepeda OT GO 1                 Sarah Zepeda OT  5/6/2025

## 2025-05-06 NOTE — PLAN OF CARE
Goal Outcome Evaluation:  Plan of Care Reviewed With: patient        Progress: no change  Outcome Evaluation: patient demonstrate balance and endurance limitations that impact his ability to safely and independently perform adls and adl transfers. The skills of a therapist are necessary in order to implement the following treatment plan.    Anticipated Discharge Disposition (OT): home with assist, home with outpatient therapy services

## 2025-05-06 NOTE — ANESTHESIA POSTPROCEDURE EVALUATION
Patient: Malik Abbasi    Procedure Summary       Date: 05/06/25 Room / Location: [facility] OR 04 / [facility] MAIN OR    Anesthesia Start: 0814 Anesthesia Stop: 0958    Procedure: LEFT TOTAL KNEE ARTHROPLASTY WITH DOUG ROBOT (Left: Knee) Diagnosis:       Osteoarthritis of left knee, unspecified osteoarthritis type      (Osteoarthritis of left knee, unspecified osteoarthritis type [M17.12])    Surgeons: Fred Chang MD Provider: Haylee Oconnell MD    Anesthesia Type: general, regional, ERAS Protocol ASA Status: 3            Anesthesia Type: general, regional, ERAS Protocol    Vitals  Vitals Value Taken Time   /73 05/06/25 10:42   Temp 37.2 °C (99 °F) 05/06/25 10:40   Pulse 65 05/06/25 10:45   Resp 20 05/06/25 10:40   SpO2 92 % 05/06/25 10:45           Post Anesthesia Care and Evaluation    Patient location during evaluation: bedside  Patient participation: complete - patient participated  Level of consciousness: awake  Pain management: adequate    Airway patency: patent  PONV Status: none  Cardiovascular status: acceptable and stable  Respiratory status: acceptable  Hydration status: acceptable

## 2025-05-06 NOTE — PLAN OF CARE
Goal Outcome Evaluation:  Plan of Care Reviewed With: patient        Progress: no change  Outcome Evaluation: Pt had a left total knee done today by doctor Chang. Pt's block is still working and he has not had any pain medication. Pt was able to walk from the stretcher to the chair after surgery. Pt has had some  nausea that was treated with zofran. Pt is going home with the help of his wife. Pt has an outpatient PT appointment scheduled on 5/8/25 at [facility]. Pt is a x 1 assist with gait belt and walker. Pt was able to work with PT and OT today before discharging home.

## 2025-05-06 NOTE — OP NOTE
TOTAL KNEE ARTHROPLASTY WITH DOUG ROBOT  Procedure Report    Patient Name:  Malik Abbasi  YOB: 1965    Date of Surgery:  5/6/2025       Pre-op Diagnosis:   Osteoarthritis of left knee, unspecified osteoarthritis type [M17.12]       Post-Op Diagnosis Codes:     * Osteoarthritis of left knee, unspecified osteoarthritis type [M17.12]    Procedure/CPT® Codes:  DC ARTHRP KNE CONDYLE&PLATU MEDIAL&LAT COMPARTMENTS [51118]    Procedure(s):  LEFT TOTAL KNEE ARTHROPLASTY WITH DOUG ROBOT    Surgical Approach: Knee Medial Parapatellar        Staff:  Surgeon(s):  Fred Chang MD    Assistant: Kenia Rodriguez RN; Nanci Justin    Anesthesia: General with Block    Estimated Blood Loss: 50ml    Implants:    Implant Name Type Inv. Item Serial No.  Lot No. LRB No. Used Action   CMT BONE PALACOS R HI/VISC 1X40 - [id number] Implant CMT BONE PALACOS R HI/VISC 1X40  [facility] [id number] Left 2 Implanted   PAT KN PERSONA ALLPOLY CMT 8.5X32MM - [id number] Implant PAT KN PERSONA ALLPOLY CMT 8.5X32MM  [employer] [id number] Left 1 Implanted   ART/SRF KN PERSONA/VE PS MC GH 8TO11 10MM LT - [id number] Implant ART/SRF KN PERSONA/VE PS MC GH 8TO11 10MM LT  [employer] [id number] Left 1 Implanted   STEM TIB/KN PERSONA CMT 5D SZG LT - [id number] Implant STEM TIB/KN PERSONA CMT 5D SZG LT  [employer] [id number] Left 1 Implanted   COMP FEM/KN PERSONA CR CMT COCR STD SZ10 LT - [id number] Implant COMP FEM/KN PERSONA CR CMT COCR STD SZ10 LT  [employer] [id number] Left 1 Implanted       Specimen:          None    Findings: See description    Complications: None    Description of Procedure: Patient was taken to the operating room placed supine operating table after abductor canal blocks and in preoperative holding.  After general tracheal anesthesia was established the left knee and lower extremity were prepped and draped in standard surgical fashion using alcohol ChloraPrep.  The Doug robot was also draped  sterilely and calibrated.  Incision was made 2 fingerbreadth superior superior pole of the patella down to the medial aspect of tibial tubercle the knife and full-thickness skin flaps were raised laterally and medially.  A medial parapatellar arthrotomy was then undertaken and the knee was brought into flexion.  Retractors were placed to protect protect the collateral ligaments.  Soft tissue releases and osteophytes removed as deemed necessary.  Then the tracking device was mounted on the distal femur in a standard fashion as well as through separate stab incisions in the distal tibia 5 fingerbreadths inferior to the tibial tubercle.  Then all the femoral points were mapped out using the Erlinda software the tibial points were mapped out as well.  While the plan for resection was being completed the patella was everted calipered and cut to the appropriate thickness.  The correct size patella was chosen in the locals for the patella were reamed and a trial patella was placed and the knee was brought back into flexion.  The distal femoral cutting block was then brought in using robotic assisted arm and the distal femoral cut was made it was checked and verified and accepted.  Then the external rotation arm of the robot was used to pin the distal femur and the correct external rotation decided by the intraoperative plan using the previously mapped points.  Then the tibia was cut after removing the robotic arm and to the appropriate position to cut the tibia the cutting block was pinned and the proximal tibia cut was made excess bone from the cut was removed and the 4-in-1 cutting block was then used in the distal femur.  The tibial cut was verified and accepted femoral and tibial trials were then placed and the knee was taken through range of motion verifying flexion extension gaps and extension and flexion range of motion to be acceptable by using the software in a standard fashion.  Locals for the femur were then  drilled the trials were removed the bone ends were copiously irrigated with bacitracin Simpulse irrigation.  The tibia was cemented in place according to marked external rotation from the trials the femur was cemented in position second and then the real polychosen was placed.  Excess cement removed from the implant edges the knee was held in extension until the cement dried and the patella was cemented into place and held with a patellar clamp.  After the cement hardened excess management from the implant edges Irrisept was used and wound was copiously irrigated the knee was taken through range of motion and checked 1 last time the patella tracked in the center of the trochlear groove the femur using no thumbs technique.  Then the arthrotomy was closed in 45 degrees of flexion with Ethibond the deep fat was closed 0 Vicryl subcu was closed with 2-0 Vicryl and the skin was closed with staples incision was washed and dried and sterile dressings were applied the patient tolerated the procedure well and was taken to recovery room.       Fred Chang MD     Date: 5/6/2025  Time: 11:36 EDT

## 2025-05-08 ENCOUNTER — TREATMENT (OUTPATIENT)
Dept: PHYSICAL THERAPY | Facility: CLINIC | Age: 60
End: 2025-05-08
Payer: OTHER GOVERNMENT

## 2025-05-08 DIAGNOSIS — R26.9 GAIT DISTURBANCE: ICD-10-CM

## 2025-05-08 DIAGNOSIS — M25.662 DECREASED RANGE OF MOTION (ROM) OF LEFT KNEE: ICD-10-CM

## 2025-05-08 DIAGNOSIS — M25.562 ACUTE PAIN OF LEFT KNEE: ICD-10-CM

## 2025-05-08 DIAGNOSIS — Z96.652 STATUS POST TOTAL LEFT KNEE REPLACEMENT: Primary | ICD-10-CM

## 2025-05-08 DIAGNOSIS — R29.898 WEAKNESS OF LEFT LOWER EXTREMITY: ICD-10-CM

## 2025-05-08 NOTE — PROGRESS NOTES
Physical Therapy Initial Evaluation and Plan of Care  [address] [facility] [address] [place] [address]    Patient: Malik Abbasi   : 1965  Diagnosis/ICD-10 Code:  Status post total left knee replacement [Z96.652]  Referring practitioner: Fred Chang MD  Date of Initial Visit: 2025  Today's Date: 2025  Patient seen for 1 sessions           Subjective Questionnaire: LEFS:       Subjective Evaluation    History of Present Illness  Mechanism of injury: Pt presents to PT s/p L knee replacement on 2025. Pt spent one night in the hospital and was then discharged home with wife who is able to assist as needed. Pt ambulates into clinic with [facility], but reports he has been using his walker at home. Pt presents to PT with ANDRA hose donned. Pt reports he has been compliant with HEP and icing his knee. Pt reports he has been taking pain medication for pain relief.       Patient Occupation: Retired Pain  Current pain ratin  At best pain ratin  At worst pain ratin  Quality: discomfort  Relieving factors: ice and medications  Aggravating factors: movement, ambulation and standing    Patient Goals  Patient goals for therapy: decreased pain, improved balance, increased motion, increased strength and independence with ADLs/IADLs             Objective          Observations   Left Knee   Positive for incision.     Additional Knee Observation Details  Incision is covered with Aquacel dressing with no signs or symptoms of infection and no current bruising.     Active Range of Motion   Left Knee   Flexion: 79 degrees   Extension: 15 degrees     Right Knee   Flexion: 105 degrees   Extension: 8 degrees     Passive Range of Motion   Left Knee   Flexion: 88 degrees   Extension: 8 degrees     Strength/Myotome Testing     Left Hip   Planes of Motion   Flexion: 3-    Right Hip   Planes of Motion   Flexion: 4+    Left Knee   Flexion: 3+  Extension: 3  Quadriceps contraction: poor    Right Knee   Flexion:  4+  Extension: 4+    Ambulation     Observational Gait     Additional Observational Gait Details  Pt ambulates with STC with decrease in stance time, decrease in stance time on L LE, and a tendency to shuffle his feet. Adjusted patient's cane to appropriate height, but advised to use a RW for at least the first week for safety.         See Exercise, Manual, and Modality Logs for complete treatment.       Assessment & Plan       Assessment  Impairments: abnormal gait, abnormal muscle firing, abnormal or restricted ROM, activity intolerance, impaired balance, impaired physical strength, lacks appropriate home exercise program, pain with function and weight-bearing intolerance   Functional limitations: lifting, walking, uncomfortable because of pain and standing   Assessment details:  Pt presents to PT s/p L knee replacement on 05/06/2025. Pt has decrease in L knee ROM, decrease in L LE strength, antalgic gait with use of AD, increase in L knee pain, and decrease in functional mobility. Pt requires skilled PT in order to address deficits listed to return patient back to maximum function such as walking without an AD. Reviewed with patient signs and symptoms on infection and DVT. Reviewed with patient HEP and frequency to perform. Will continue to progress patient as able.   Prognosis: good    Goals  Plan Goals:     LTG 1: 12 weeks: The patient will demonstrate 0 to 201 degrees of ROM for the left knee in order to allow patient to ascend/descend stairs and ambulate with normal gait mechanics.    STATUS: New      STG 1a: 6 weeks: The patient will demonstrate 5 to 100 degrees of ROM for the left knee.    STATUS: New      2. The patient has limited strength of the left knee.    LTG 2: 12 weeks: The patient will demonstrate 5/5 strength for left knee flexion and extension in order to allow patient improved joint stability.    STATUS: New    STG 2a: 6 weeks: The patient will demonstrate 4/5 strength for left knee flexion and  extension    STATUS: New      3. The patient has gait dysfunction.    LTG 3: 12 weeks: The patient will ambulate without assistive device, independently, for community distances with minimal limp to the left lower extremity in order to improve mobility and allow patient to perform activities such as grocery shopping with greater ease.    STATUS: New      LTG 4: 12 weeks: The patient will report a pain rating of 1/10 or better in order to improve tolerance to activities of daily living and improve sleep quality.    STATUS: New    STG 4: 6 weeks: The patient will report a pain rating of 3/10 or better.    STATUS: New    LTG 5: 12 weeks: The patient will demonstrate 20-39% limitation by achieving a score of 60 on the Lower Extremity Functional Scale.    STATUS: New      STG 5a: 6 weeks: The patient will demonstrate 40-59% limitation by achieving a score of 40 on the Lower Extremity Functional Scale.    STATUS: New      Plan  Therapy options: will be seen for skilled therapy services  Planned modality interventions: cryotherapy, TENS, electrical stimulation/Russian stimulation and thermotherapy (hydrocollator packs)  Planned therapy interventions: manual therapy, neuromuscular re-education, ADL retraining, balance/weight-bearing training, flexibility, functional ROM exercises, gait training, home exercise program, joint mobilization, soft tissue mobilization, strengthening, stretching, therapeutic activities, abdominal trunk stabilization, postural training, spinal/joint mobilization and IADL retraining  Frequency: 3x week  Duration in weeks: 12  Treatment plan discussed with: patient        History # of Personal Factors and/or Comorbidities: LOW (0)  Examination of Body System(s): # of elements: LOW (1-2)  Clinical Presentation: STABLE   Clinical Decision Making: LOW       Timed:         Manual Therapy:    10     mins  99279;     Therapeutic Exercise:    14     mins  97827;     Neuromuscular Melanie:    0    mins  26847;     Therapeutic Activity:     0     mins  83465;     Gait Trainin     mins  45548;     Ultrasound:     0     mins  27863;    Ionto                               0    mins   81182  Self pay                         0     mins [id number]    Un-Timed:  Electrical Stimulation:    0     mins  07318 ( )  Traction     0     mins 50076  Low Eval     20     Mins  01239  Mod Eval     0     Mins  78705  High Eval                       0     Mins  69277  Self Pay Eval                 0     PTSP1   Re-Eval                           0    mins  00129        Timed Treatment:   24   mins   Total Treatment:     44   mins    PT SIGNATURE: Electronically signed by Enio Mariscal PT  [place] LICENSE: [id number]    DATE TREATMENT INITIATED: 2025    Initial Certification  Certification Period: 2025 thru 2025  I certify that the therapy services are furnished while this patient is under my care.  The services outlined above are required by this patient, and will be reviewed every 90 days.     PHYSICIAN: Fred Chang MD   NPI: [id number]      DATE:     Please sign and return via fax to [phone] Thank you, [facility].

## 2025-05-12 ENCOUNTER — TREATMENT (OUTPATIENT)
Dept: PHYSICAL THERAPY | Facility: CLINIC | Age: 60
End: 2025-05-12
Payer: OTHER GOVERNMENT

## 2025-05-12 DIAGNOSIS — R26.9 GAIT DISTURBANCE: ICD-10-CM

## 2025-05-12 DIAGNOSIS — Z96.652 STATUS POST TOTAL LEFT KNEE REPLACEMENT: Primary | ICD-10-CM

## 2025-05-12 DIAGNOSIS — M25.562 ACUTE PAIN OF LEFT KNEE: ICD-10-CM

## 2025-05-12 DIAGNOSIS — R29.898 WEAKNESS OF LEFT LOWER EXTREMITY: ICD-10-CM

## 2025-05-12 DIAGNOSIS — M25.662 DECREASED RANGE OF MOTION (ROM) OF LEFT KNEE: ICD-10-CM

## 2025-05-12 PROCEDURE — 97140 MANUAL THERAPY 1/> REGIONS: CPT | Performed by: PHYSICAL THERAPIST

## 2025-05-12 PROCEDURE — 97530 THERAPEUTIC ACTIVITIES: CPT | Performed by: PHYSICAL THERAPIST

## 2025-05-12 PROCEDURE — 97110 THERAPEUTIC EXERCISES: CPT | Performed by: PHYSICAL THERAPIST

## 2025-05-12 NOTE — PROGRESS NOTES
Physical Therapy Daily Treatment Note  [address], [address] [place] [address]        Patient: Malik Abbasi   : 1965  Diagnosis/ICD-10 Code:  Status post total left knee replacement [Z96.652]  Referring practitioner: Fred Chang MD  Date of Initial Visit: Type: THERAPY  Noted: 2025  Today's Date: 2025  Patient seen for 2 sessions             Subjective   Malik Abbasi reports having 2-3/10 pain in his left knee upon arrival today.   He reports that he is not needing to take any pain medication and ambulated into clinic today without assistive device.      Objective     Active Range of Motion   Left Knee   Flexion: 96 degrees   Extension: 10 degrees (Lacking full extension)        Passive Range of Motion   Left Knee   Flexion: 100 degrees   Extension: 8 degrees   (Lacking full extension)    See Exercise and Manual Logs for complete treatment.       Assessment/Plan    Pt tolerated therapy session well - with progression of therapeutic exercises, CKC-Functional activities, and Manual therapy. He has improved, but continues to have deficits in His Left Knee ROM,  Strength, and Stability; limiting function and ability to perform ADLs at this time.  Good increase in left knee AROM/PROM as compared to initial evaluation.  Discussed with pt advantages of using Assistive device to improve gait and decrease limping.  Pt verbalized good understanding.    Progress per Plan of Care- as tolerated           Timed:  Manual Therapy:    10     mins  60034;  Therapeutic Exercise:    20     mins  09040;     Neuromuscular Melanie:    0    mins  93765;    Therapeutic Activity:     10     mins  96365;     Gait Trainin     mins  98771;     Ultrasound:     0     mins  73313;    Electrical Stimulation:    0     mins  98073;  Iontophoresis     0     mins  59875    Untimed:  Electrical Stimulation:    0     mins  80650 ( );  Mechanical Traction:    0     mins  18434;   Fluidotherapy     0     mins   36252  Hot pack     0     mins  80732  Cold pack     0     mins  97262    Timed Treatment:   40   mins   Total Treatment:     50   mins        Bea Waddell PTA  Physical Therapist Assistant

## 2025-05-14 ENCOUNTER — TREATMENT (OUTPATIENT)
Dept: PHYSICAL THERAPY | Facility: CLINIC | Age: 60
End: 2025-05-14
Payer: OTHER GOVERNMENT

## 2025-05-14 DIAGNOSIS — R26.9 GAIT DISTURBANCE: ICD-10-CM

## 2025-05-14 DIAGNOSIS — R29.898 WEAKNESS OF LEFT LOWER EXTREMITY: ICD-10-CM

## 2025-05-14 DIAGNOSIS — Z96.652 STATUS POST TOTAL LEFT KNEE REPLACEMENT: Primary | ICD-10-CM

## 2025-05-14 DIAGNOSIS — M25.662 DECREASED RANGE OF MOTION (ROM) OF LEFT KNEE: ICD-10-CM

## 2025-05-14 DIAGNOSIS — M25.562 ACUTE PAIN OF LEFT KNEE: ICD-10-CM

## 2025-05-14 NOTE — PROGRESS NOTES
Physical Therapy Daily Treatment Note  [address] [facility] [address] [facility] [address], [address] [place] [address]        Patient: Malik Abbasi   : 1965  Diagnosis/ICD-10 Code:  Status post total left knee replacement [Z96.652]  Referring practitioner: Fred Chang MD  Date of Initial Visit: Type: THERAPY  Noted: 2025  Today's Date: 2025  Patient seen for 3 sessions             Subjective   Malik Abbasi reports having mild left knee pain upon arrival- rating 2-3/10 upon arrival.  He states that he is not currently taking any pain medication.   He reports that he has been having some increased hip soreness after therapy sessions.    Pt arrived to therapy session ambulating in/out of clinic with single tip cane.    Objective     Active Range of Motion   Left Knee   Flexion: 96 degrees   Extension: 10 degrees (Lacking full extension)        Passive Range of Motion   Left Knee   Flexion: 101 degrees   Extension: 8 degrees   (Lacking full extension)    + Tightness noted Left Hip / Knee musculature.  Left Lower extremity has tendency to externally rotate when ambulating or when at rest.      See Exercise and Manual Logs for complete treatment.       Assessment/Plan    Pt tolerated therapy session well - with progression of therapeutic exercises, CKC-Functional activities, and Manual therapy. He has improved, but continues to have deficits in His Left Knee ROM,  Strength, and Stability; limiting function and ability to perform ADLs at this time.  Good increase in left knee AROM/PROM as compared to initial evaluation. Pt able to make full revolution on bike after gentle warm up.   Discussed with pt advantages of using Assistive device to improve gait and decrease limping.  Pt verbalized good understanding.  Pt ambulated into clinic with assistive device this date.  Gait improved somewhat; however, he continues to ambulated with stiff-leg gait pattern- exhibiting decreased heel strike and push off bilaterally.      Progress  per Plan of Care- as tolerated               Timed:  Manual Therapy:    10     mins  21840;  Therapeutic Exercise:    20     mins  12066;     Neuromuscular Melanie:    0    mins  32104;    Therapeutic Activity:     8     mins  97298;     Gait Trainin     mins  16723;     Ultrasound:     0     mins  42138;    Electrical Stimulation:    0     mins  51927;  Iontophoresis     0     mins  45955    Untimed:  Electrical Stimulation:    0     mins  05484 ( );  Mechanical Traction:    0     mins  98692;   Fluidotherapy     0     mins  57875  Hot pack     0     mins  11191  Cold pack     0     mins  15634    Timed Treatment:   38   mins   Total Treatment:    48   mins        Bea Waddell PTA  Physical Therapist Assistant

## 2025-05-16 ENCOUNTER — TREATMENT (OUTPATIENT)
Dept: PHYSICAL THERAPY | Facility: CLINIC | Age: 60
End: 2025-05-16
Payer: OTHER GOVERNMENT

## 2025-05-16 DIAGNOSIS — R26.9 GAIT DISTURBANCE: ICD-10-CM

## 2025-05-16 DIAGNOSIS — M25.562 ACUTE PAIN OF LEFT KNEE: ICD-10-CM

## 2025-05-16 DIAGNOSIS — R29.898 WEAKNESS OF LEFT LOWER EXTREMITY: ICD-10-CM

## 2025-05-16 DIAGNOSIS — M25.662 DECREASED RANGE OF MOTION (ROM) OF LEFT KNEE: ICD-10-CM

## 2025-05-16 DIAGNOSIS — Z96.652 STATUS POST TOTAL LEFT KNEE REPLACEMENT: Primary | ICD-10-CM

## 2025-05-16 NOTE — PROGRESS NOTES
"Physical Therapy Daily Treatment Note  [address] [facility], [address] [place] [address]        Patient: Malik Abbasi   : 1965  Diagnosis/ICD-10 Code:  Status post total left knee replacement [Z96.652]  Referring practitioner: Fred Chang MD  Date of Initial Visit: Type: THERAPY  Noted: 2025  Today's Date: 2025  Patient seen for 4 sessions             Subjective       Malik Abbasi reports having 2/10 pain in his left knee upon arrival today.  He states that he has been up more today and hasn't had an opportunity to Ice his knee or elevate is leg.  He reports that he has more discomfort from persistent swelling and \"Tightness\" of his entire leg.    Objective     Active Range of Motion   Left Knee   Flexion: 94 degrees   Extension: 10 degrees (Lacking full extension)        Passive Range of Motion (after stretching)  Left Knee   Flexion: 104 degrees   Extension: 8 degrees   (Lacking full extension)     + Tightness noted Left Hip / Knee musculature.  Left Lower extremity has tendency to externally rotate when ambulating or when at rest.    + Edema Left Lower extremity     (Indentation noted left lower extremity at bandage site and at hem of sock)    See Exercise and Manual Logs for complete treatment.       Assessment/Plan    Pt tolerated therapy session well today with progression of therapeutic exercises, CKC-Functional activities, and Manual therapy. He has improved, but continues to have deficits in His Left Hip/Knee ROM,  Strength, and Stability; limiting functional mobility and ability to perform ADLs without increased pain or difficulty at this time.  Good increase in left knee AROM/PROM as compared to initial evaluation. Pt able to make full revolution on bike after gentle warm up.  Pt ambulating in/out of  clinic with single tip cane this date.  Gait improved somewhat; however, he continues to ambulated with stiff-leg gait pattern- exhibiting decreased heel strike and push off " bilaterally. Moderate edema noted Left Lower extremity.  Mild redness and increased warmth to touch.  No drainage noted at incision site.  Pt instructed to continue to closely monitor and to call MD if increase or change in  redness or drainage noted at incision site.  Pt educated regarding signs and symptoms of infection.  Pt verbalized good understanding and agreement with plan.  Pt instructed to Elevate his leg and ice upon return home following today's therapy session.     Progress per Plan of Care- as tolerated               Timed:  Manual Therapy:    10     mins  29143;  Therapeutic Exercise:    20     mins  66771;     Neuromuscular Melanie:    0    mins  79649;    Therapeutic Activity:     10     mins  80802;     Gait Trainin     mins  48223;     Ultrasound:     0     mins  37962;    Electrical Stimulation:    0     mins  10693;  Iontophoresis     0     mins  57281    Untimed:  Electrical Stimulation:    0     mins  83889 ( );  Mechanical Traction:    0     mins  34772;   Fluidotherapy     0     mins  52729  Hot pack     0     mins  26169  Cold pack     0     mins  07028    Timed Treatment:   40   mins   Total Treatment:     50   mins        Bea Waddell PTA  Physical Therapist Assistant

## 2025-05-19 ENCOUNTER — OFFICE VISIT (OUTPATIENT)
Dept: ORTHOPEDIC SURGERY | Facility: CLINIC | Age: 60
End: 2025-05-19
Payer: OTHER GOVERNMENT

## 2025-05-19 ENCOUNTER — TREATMENT (OUTPATIENT)
Dept: PHYSICAL THERAPY | Facility: CLINIC | Age: 60
End: 2025-05-19
Payer: OTHER GOVERNMENT

## 2025-05-19 VITALS
SYSTOLIC BLOOD PRESSURE: 143 MMHG | DIASTOLIC BLOOD PRESSURE: 91 MMHG | HEART RATE: 48 BPM | BODY MASS INDEX: 44.1 KG/M2 | HEIGHT: 71 IN | OXYGEN SATURATION: 96 % | WEIGHT: 315 LBS

## 2025-05-19 DIAGNOSIS — Z47.1 AFTERCARE FOLLOWING LEFT KNEE JOINT REPLACEMENT SURGERY: Primary | ICD-10-CM

## 2025-05-19 DIAGNOSIS — R29.898 WEAKNESS OF LEFT LOWER EXTREMITY: ICD-10-CM

## 2025-05-19 DIAGNOSIS — M25.662 DECREASED RANGE OF MOTION (ROM) OF LEFT KNEE: ICD-10-CM

## 2025-05-19 DIAGNOSIS — Z96.652 STATUS POST TOTAL LEFT KNEE REPLACEMENT: Primary | ICD-10-CM

## 2025-05-19 DIAGNOSIS — Z96.652 S/P TOTAL KNEE ARTHROPLASTY, LEFT: ICD-10-CM

## 2025-05-19 DIAGNOSIS — R26.9 GAIT DISTURBANCE: ICD-10-CM

## 2025-05-19 DIAGNOSIS — Z96.652 AFTERCARE FOLLOWING LEFT KNEE JOINT REPLACEMENT SURGERY: Primary | ICD-10-CM

## 2025-05-19 DIAGNOSIS — M25.562 ACUTE PAIN OF LEFT KNEE: ICD-10-CM

## 2025-05-19 PROCEDURE — 97140 MANUAL THERAPY 1/> REGIONS: CPT | Performed by: PHYSICAL THERAPIST

## 2025-05-19 PROCEDURE — 97110 THERAPEUTIC EXERCISES: CPT | Performed by: PHYSICAL THERAPIST

## 2025-05-19 PROCEDURE — 99024 POSTOP FOLLOW-UP VISIT: CPT | Performed by: PHYSICIAN ASSISTANT

## 2025-05-19 PROCEDURE — 97530 THERAPEUTIC ACTIVITIES: CPT | Performed by: PHYSICAL THERAPIST

## 2025-05-19 NOTE — PROGRESS NOTES
Chief Complaint  Pain and Follow-up of the Left Knee and Suture / Staple Removal    Subjective          History of Present Illness       History of Present Illness  The patient is a 60-year-old male who presents for a follow-up on his left knee. He is 2 weeks postoperative from a left total knee arthroplasty performed with the DOUG robot by Dr. Chang on 2025.    He reports satisfactory progress with his left knee, noting that he has not required pain medication since the previous Saturday. He experiences minimal pain, which he describes as similar to a toothache, and finds relief with Tylenol. He also reports some residual soreness in the quadriceps region. He has been diligent in applying ice to the area and attending physical therapy sessions with [facility] in [place].  He recalls an incident last week where he experienced swelling after a day of increased activity and insufficient rest.  He is using a cane per instruction by PT.  He has resumed his Xarelto and inquires if he can resume the baby aspirin as well.  He inquires about return to [employer] for light treadmill exercise he and weight training.      SOCIAL HISTORY  Exercise: Used to go to [employer] three to four times a week.      Patient denies redness, drainage, or complications from incision. Denies fever or chills.     Allergies   Allergen Reactions    Propafenone Shortness Of Breath    Tirzepatide Anaphylaxis     Hives    Codeine Other (See Comments) and Dizziness     Syncope as a teenager     Naltrexone-Bupropion Hcl Er Mental Status Change     Other reaction(s): Dream disorder        Social History     Socioeconomic History    Marital status:    Tobacco Use    Smoking status: Former     Current packs/day: 0.00     Types: Cigarettes     Quit date: 2006     Years since quittin.6    Smokeless tobacco: Never   Vaping Use    Vaping status: Never Used   Substance and Sexual Activity    Alcohol use: Yes     Comment: rarely  "   Drug use: No    Sexual activity: Defer        Tobacco Use: Medium Risk (5/19/2025)    Patient History     Smoking Tobacco Use: Former     Smokeless Tobacco Use: Never     Passive Exposure: Not on file     Patient reports that they are a nonsmoker; cessation education not applicable.     I reviewed the patient's chief complaint, history of present illness, review of systems, past medical history, surgical history, family history, social history, medications, and allergy list.     REVIEW OF SYSTEMS    Constitutional: Denies fevers, chills, weight loss  Cardiovascular: Denies chest pain, shortness of breath  Skin: Denies rashes, acute skin changes  Neurologic: Denies headache, loss of consciousness  MSK: Left knee pain      Objective   Vital Signs:   /91   Pulse (!) 48   Ht 180.3 cm (71\")   Wt (!) 148 kg (326 lb 4.5 oz)   SpO2 96%   BMI 45.51 kg/m²     Body mass index is 45.51 kg/m².    Physical Exam    Physical Exam  Musculoskeletal:  Gait: Patient uses a cane at home which helps him walk more normally.  Left Knee: Postoperative left knee shows some bruising that is resolving. Patient demonstrates ability to extend the leg straight with minimal lag. Flexion is approximately 90 degrees. Mild swelling present. No signs of infection or complications noted.    General: Alert, no acute distress  Gait: Mildly antalgic with use of cane  Left lower extremity: Staples and sutures removed today without complications.  Anterior knee incision is healing appropriately.  2 distal incisions are well-healing.  No active drainage or redness noted.  No concerning signs for infection.  Mild to moderate knee effusion. Knee extensor mechanism intact. Hip flexion intact.  90 degrees flexion.  -2 degrees  extension. Knee stable to varus and valgus stress. Calf soft, nontender. Demonstrates active ankle plantarflexion and dorsiflexion. Sensation intact over the dorsal and plantar foot. Palpable pedal pulses.         Imaging " Results (Most Recent)       Procedure Component Value Units Date/Time    XR Knee 3 View Left [[id number]] Resulted: 05/19/25 0904     Updated: 05/19/25 0904    Narrative:      X-Ray Report:  Study: X-rays ordered, taken in the office, and reviewed today.   Site: Left knee Xray  Indication: Left total knee arthroplasty follow up.   View: AP/Lateral, Standing, and [place] view(s)  Findings: Intact left total knee arthroplasty. No evidence of hardware   malfunction, complication or loosening. No periprosthetic fractures   visualized. Patella is midline tracking on sunrise view.   Prior studies available for comparison: yes                      Assessment and Plan    Diagnoses and all orders for this visit:    1. Aftercare following left knee joint replacement surgery (Primary)  -     XR Knee 3 View Left    2. S/P total knee arthroplasty, left        Assessment & Plan  1. Postoperative status following left total knee arthroplasty:  X-rays taken and reviewed, showing intact hardware.    Staples removed in office and Steri-Strips applied. Patient educated on incision care. You may shower but do not soak or submerge in water until incision is fully healed.  Do not apply creams or lotions over the incision. Please allow Steri-Strips to fall off on their own within 7 to 10 days.    Continue icing and elevation of the knee as needed to help with pain and swelling.  You may ice knee for approximately 15-20 minutes, three times daily, and as needed.   Continue PT to progress ROM, strength, and weightbearing status. Important to do home exercises in addition to PT. Continue use of cane/walker until recommended by PT to discontinue.     Advised holding off on going to the gym until he has progressed into some of those activities under supervision with PT first.    We discussed the importance of weaning from narcotic medications.  He is currently controlling pain with Tylenol alone.  He will contact the office if he needs refills  on pain medication.  Patient has already resumed his baseline Xarelto, okay to begin his baby aspirin as well.    Follow-up in 4 weeks. Repeat x-rays not needed at this visit.  Please call with questions or concerns.          Call or return if symptoms worsen or patient has any concerns.       Follow Up   No follow-ups on file.  There are no Patient Instructions on file for this visit.  Patient was given instructions and counseling regarding his condition or for health maintenance advice. Please see specific information pulled into the AVS if appropriate.     Sarah Richardson PA-C  05/19/25  12:57 EDT    Patient or patient representative verbalized consent for the use of Ambient Listening during the visit with  Sarah Richardson PA-C for chart documentation. 5/19/2025  12:57 EDT

## 2025-05-19 NOTE — PROGRESS NOTES
"Physical Therapy Daily Treatment Note  [address], [address] [place] [address]        Patient: Malik Abbasi   : 1965  Diagnosis/ICD-10 Code:  Status post total left knee replacement [Z96.652]  Referring practitioner: Fred Chang MD  Date of Initial Visit: Type: THERAPY  Noted: 2025  Today's Date: 2025  Patient seen for 5 sessions             Subjective   Malik Abbasi reports having seen Orthopedic f/u earlier today.  He states that staples were removed.  He states that she told him not to do \"Deep squats\".  Steri strips in place.  He states she was pleased with current status of knee.     Objective       See Exercise and Manual Logs for complete treatment.       Assessment/Plan    Pt tolerated therapy session well today with progression of therapeutic exercises, CKC-Functional activities, and Manual therapy. He has improved, but continues to have deficits in His Left Hip/Knee ROM,  Strength, and Stability; limiting functional mobility and ability to perform ADLs without increased pain or difficulty at this time.  Good increase in left knee AROM/PROM as compared to initial evaluation. Pt able to make full revolution on bike after gentle warm up.  Pt ambulating in/out of  clinic with single tip cane this date.  Gait improved somewhat; however, he continues to ambulated with stiff-leg gait pattern- exhibiting decreased heel strike and push off bilaterally.      Progress per Plan of Care- as tolerated               Timed:  Manual Therapy:    10     mins  08025;  Therapeutic Exercise:    35     mins  91650;     Neuromuscular Melanie:    0    mins  63788;    Therapeutic Activity:     15     mins  56005;     Gait Trainin     mins  64226;     Ultrasound:     0     mins  55039;    Electrical Stimulation:    0     mins  71489;  Iontophoresis     0     mins  11065    Untimed:  Electrical Stimulation:    0     mins  45356 ( );  Mechanical Traction:    0     mins  28643; "   Fluidotherapy     0     mins  34019  Hot pack     0     mins  41483  Cold pack     0     mins  89173    Timed Treatment:   60   mins   Total Treatment:     60   mins        Bea Waddell PTA  Physical Therapist Assistant

## 2025-05-21 ENCOUNTER — TREATMENT (OUTPATIENT)
Dept: PHYSICAL THERAPY | Facility: CLINIC | Age: 60
End: 2025-05-21
Payer: OTHER GOVERNMENT

## 2025-05-21 DIAGNOSIS — M25.662 DECREASED RANGE OF MOTION (ROM) OF LEFT KNEE: ICD-10-CM

## 2025-05-21 DIAGNOSIS — R29.898 WEAKNESS OF LEFT LOWER EXTREMITY: ICD-10-CM

## 2025-05-21 DIAGNOSIS — R26.9 GAIT DISTURBANCE: ICD-10-CM

## 2025-05-21 DIAGNOSIS — M25.562 ACUTE PAIN OF LEFT KNEE: ICD-10-CM

## 2025-05-21 DIAGNOSIS — Z96.652 STATUS POST TOTAL LEFT KNEE REPLACEMENT: Primary | ICD-10-CM

## 2025-05-21 NOTE — PROGRESS NOTES
"Physical Therapy Daily Treatment Note  [address] [place] [address]        Patient: Malik Abbasi   : 1965  Diagnosis/ICD-10 Code:  Status post total left knee replacement [Z96.652]  Referring practitioner: Fred Chang MD  Date of Initial Visit: Type: THERAPY  Noted: 2025  Today's Date: 2025  Patient seen for 6 sessions             Subjective   Malik Abbasi reports having 2/10 pain in his left knee upon arrival.  He reports feeling \"Weak\" in his thigh.    Objective      Passive Range of Motion (after stretching)  Left Knee   Flexion: 110 degrees   Extension: 8 degrees   (Lacking full extension)    Verbal and Tactile cues given to provide Neuromuscular Re-education/feedback for increased awareness and activation of Quad during Step ups and SLR performance in clinic.    See Exercise and Manual Logs for complete treatment.       Assessment/Plan    Pt tolerated therapy session well today with progression of therapeutic exercises, CKC-Functional activities, and Manual therapy. He has improved, but continues to have deficits in His Left Hip/Knee ROM,  Strength, and Stability; limiting functional mobility and ability to perform ADLs without increased pain or difficulty at this time.  Good increase in left knee AROM/PROM as compared to initial evaluation. Pt able to make full revolution on bike after gentle warm up.  Pt ambulating in/out of  clinic with single tip cane this date.  Gait has improved as pt appears to be ambulating with more fluid less stiff gait pattern.      Progress per Plan of Care- as tolerated               Timed:  Manual Therapy:    10     mins  69656;  Therapeutic Exercise:    22     mins  05526;     Neuromuscular Melanie:    8    mins  51759;    Therapeutic Activity:     20     mins  89668;     Gait Trainin     mins  79005;     Ultrasound:     0     mins  89703;    Electrical Stimulation:    0     mins  25552;  Iontophoresis     0     mins  " 90576    Untimed:  Electrical Stimulation:    0     mins  71607 ( );  Mechanical Traction:    0     mins  98078;   Fluidotherapy     0     mins  61479  Hot pack     0     mins  85755  Cold pack     0     mins  25730    Timed Treatment:   60   mins   Total Treatment:     60   mins        Bea Waddell PTA  Physical Therapist Assistant

## 2025-05-23 ENCOUNTER — TREATMENT (OUTPATIENT)
Dept: PHYSICAL THERAPY | Facility: CLINIC | Age: 60
End: 2025-05-23
Payer: OTHER GOVERNMENT

## 2025-05-23 DIAGNOSIS — M25.562 ACUTE PAIN OF LEFT KNEE: ICD-10-CM

## 2025-05-23 DIAGNOSIS — Z96.652 STATUS POST TOTAL LEFT KNEE REPLACEMENT: Primary | ICD-10-CM

## 2025-05-23 DIAGNOSIS — M25.662 DECREASED RANGE OF MOTION (ROM) OF LEFT KNEE: ICD-10-CM

## 2025-05-23 DIAGNOSIS — R26.9 GAIT DISTURBANCE: ICD-10-CM

## 2025-05-23 DIAGNOSIS — R29.898 WEAKNESS OF LEFT LOWER EXTREMITY: ICD-10-CM

## 2025-05-23 NOTE — PROGRESS NOTES
Physical Therapy Daily Treatment Note  [address], [address] [place] [address]        Patient: Malik Abbasi   : 1965  Diagnosis/ICD-10 Code:  Status post total left knee replacement [Z96.652]  Referring practitioner: Fred Chang MD  Date of Initial Visit: Type: THERAPY  Noted: 2025  Today's Date: 2025  Patient seen for 7 sessions             Subjective   Malik Abbasi reports having mild discomfort in his left knee upon arrival today.  No new complaints voiced.    Objective     Passive Range of Motion (after stretching)  Left Knee   Flexion: 110 degrees   Extension: 8 degrees   (Lacking full extension)      See Exercise and Manual Logs for complete treatment.       Assessment/Plan    Pt tolerated therapy session well today with progression of therapeutic exercises, CKC-Functional activities, and Manual therapy. He has improved, but continues to have deficits in His Left Hip/Knee ROM,  Strength, and Stability; limiting functional mobility and ability to perform ADLs without increased pain or difficulty at this time.  Good increase in left knee AROM/PROM as compared to initial evaluation.  Pt able to make full revolution on bike after gentle warm up.  Pt ambulating in/out of  clinic with single tip cane this date.  Gait has improved as pt appears to be ambulating with more fluid less stiff gait pattern.      Progress per Plan of Care- as tolerated               Timed:  Manual Therapy:    10     mins  27854;  Therapeutic Exercise:    20     mins  22571;     Neuromuscular Melanie:    8    mins  74619;    Therapeutic Activity:     15     mins  63361;     Gait Trainin     mins  68495;     Ultrasound:     0     mins  45910;    Electrical Stimulation:    0     mins  98565;  Iontophoresis     00     mins  04826    Untimed:  Electrical Stimulation:    0     mins  11170 ( );  Mechanical Traction:    0     mins  16763;   Fluidotherapy     0     mins  80766  Hot pack     0     mins   73899  Cold pack     0     mins  54364    Timed Treatment:   53   mins   Total Treatment:     53   mins        Bea Waddell PTA  Physical Therapist Assistant

## 2025-05-28 ENCOUNTER — TREATMENT (OUTPATIENT)
Dept: PHYSICAL THERAPY | Facility: CLINIC | Age: 60
End: 2025-05-28
Payer: OTHER GOVERNMENT

## 2025-05-28 DIAGNOSIS — R26.9 GAIT DISTURBANCE: ICD-10-CM

## 2025-05-28 DIAGNOSIS — Z96.652 STATUS POST TOTAL LEFT KNEE REPLACEMENT: Primary | ICD-10-CM

## 2025-05-28 DIAGNOSIS — M25.662 DECREASED RANGE OF MOTION (ROM) OF LEFT KNEE: ICD-10-CM

## 2025-05-28 DIAGNOSIS — R29.898 WEAKNESS OF LEFT LOWER EXTREMITY: ICD-10-CM

## 2025-05-28 DIAGNOSIS — M25.562 ACUTE PAIN OF LEFT KNEE: ICD-10-CM

## 2025-05-28 NOTE — PROGRESS NOTES
Physical Therapy Daily Note  [address] [facility] [address] [place] [address]    VISIT#: 8    Subjective   Malik Abbasi reports 0/10 pain in L knee. Pt reports he has been going to the gym to exercise.      Objective     See Exercise, Manual, and Modality Logs for complete treatment.     Assessment/Plan    Continued to progress patient's strengthening and ROM exercises today and added external resistance with exercises and patient tolerated well. Pt continues to have limitation with L knee AROM, patient able to achieve 96 degrees of active L knee flexion today and 112 degrees of passive L knee flexion. Will continue to progress patient as able.       Progress per Plan of Care and Progress strengthening /stabilization /functional activity            Timed:                 Manual Therapy:    12     mins  60744;     Therapeutic Exercise:    20     mins  50788;     Neuromuscular Melanie:    0    mins  74563;    Therapeutic Activity:     12     mins  98250;     Gait Trainin     mins  25881;     Ultrasound:     0     mins  92531;    Ionto                               0    mins   46213  Self pay                         0     mins [email or web address]    Un-Timed:  Electrical Stimulation:    0     mins  48299 ( )  [employer] Repos    0     mins 71060  Dry Needling     0     mins self-pay  Traction     0     mins 64593    Timed Treatment:   44   mins   Total Treatment:     44   mins    Enio Mariscal PT  Physical Therapist    PT SIGNATURE: Electronically signed by Enio Mariscal PT  [place] LICENSE: [id number]

## 2025-05-30 ENCOUNTER — TREATMENT (OUTPATIENT)
Dept: PHYSICAL THERAPY | Facility: CLINIC | Age: 60
End: 2025-05-30
Payer: OTHER GOVERNMENT

## 2025-05-30 DIAGNOSIS — R29.898 WEAKNESS OF LEFT LOWER EXTREMITY: ICD-10-CM

## 2025-05-30 DIAGNOSIS — Z96.652 STATUS POST TOTAL LEFT KNEE REPLACEMENT: Primary | ICD-10-CM

## 2025-05-30 DIAGNOSIS — M25.662 DECREASED RANGE OF MOTION (ROM) OF LEFT KNEE: ICD-10-CM

## 2025-05-30 DIAGNOSIS — M25.562 ACUTE PAIN OF LEFT KNEE: ICD-10-CM

## 2025-05-30 DIAGNOSIS — R26.9 GAIT DISTURBANCE: ICD-10-CM

## 2025-05-30 NOTE — PROGRESS NOTES
Physical Therapy Daily Treatment Note  [address] [place] [address]        Patient: Malik Abbasi   : 1965  Diagnosis/ICD-10 Code:  Status post total left knee replacement [Z96.652]  Referring practitioner: Fred Chang MD  Date of Initial Visit: Type: THERAPY  Noted: 2025  Today's Date: 2025  Patient seen for 9 sessions             Subjective   Malik Abbasi denies having any pain in his left knee upon arrival today.    Objective     Active Range of Motion   Left Knee   Flexion: 107 degrees   Extension: 12 degrees (Lacking full extension)        Passive Range of Motion (after stretching)  Left Knee   Flexion: 110-112 degrees   Extension: 8 degrees   (Lacking full extension)     + Tightness noted Left Hip / Knee musculature.  Left Lower extremity has tendency to externally rotate when ambulating or when at rest.    See Exercise and Manual Logs for complete treatment.       Assessment/Plan    Pt tolerated therapy session well today with progression of therapeutic exercises, CKC-Functional activities, and Manual therapy. He has improved, but continues to have deficits in His Left Hip/Knee ROM,  Strength, and Stability; limiting functional mobility and ability to perform ADLs without increased pain or difficulty at this time.  Good increase in left knee AROM/PROM as compared to initial evaluation.    Pt ambulating in/out of  clinic without assistive device this date.  Gait has improved as pt appears to be ambulating with more fluid less stiff gait pattern. General stiffness observed in bilateral lower extremities during ambulation.     Progress per Plan of Care- as tolerated               Timed:  Manual Therapy:    12     mins  98354;  Therapeutic Exercise:    20     mins  54697;     Neuromuscular Melanie:    8    mins  45725;    Therapeutic Activity:     15     mins  51767;     Gait Trainin     mins  69129;     Ultrasound:     0     mins  49239;    Electrical  Stimulation:    0     mins  96655;  Iontophoresis     0     mins  04782    Untimed:  Electrical Stimulation:    0     mins  30537 ( );  Mechanical Traction:    0     mins  60463;   Fluidotherapy     0     mins  74754  Hot pack     0     mins  16397  Cold pack     0     mins  90950    Timed Treatment:   55   mins   Total Treatment:     55   mins        Bea Waddell PTA  Physical Therapist Assistant

## 2025-06-05 ENCOUNTER — TREATMENT (OUTPATIENT)
Dept: PHYSICAL THERAPY | Facility: CLINIC | Age: 60
End: 2025-06-05
Payer: OTHER GOVERNMENT

## 2025-06-05 ENCOUNTER — TELEPHONE (OUTPATIENT)
Dept: ORTHOPEDIC SURGERY | Facility: CLINIC | Age: 60
End: 2025-06-05
Payer: OTHER GOVERNMENT

## 2025-06-05 DIAGNOSIS — R26.9 GAIT DISTURBANCE: ICD-10-CM

## 2025-06-05 DIAGNOSIS — R29.898 WEAKNESS OF LEFT LOWER EXTREMITY: ICD-10-CM

## 2025-06-05 DIAGNOSIS — Z96.652 STATUS POST TOTAL LEFT KNEE REPLACEMENT: Primary | ICD-10-CM

## 2025-06-05 DIAGNOSIS — M25.562 ACUTE PAIN OF LEFT KNEE: ICD-10-CM

## 2025-06-05 DIAGNOSIS — M25.662 DECREASED RANGE OF MOTION (ROM) OF LEFT KNEE: ICD-10-CM

## 2025-06-05 NOTE — TELEPHONE ENCOUNTER
RECEIVED IMAGE FROM ZORAN PT WITH  PT MARCELLA. PATIENT HAS PICKED SCAB FROM DISTAL PORTION OF KNEE INCISION. THERE IS NO DRAINAGE PRESENT BUT SLIGHT REDNESS AROUND THE AREA WHERE SCAB HAS BEEN REMOVED.   ASKED ZORAN TO LET PATIENT KNOW TO KEEP AN EYE ON THIS AREA AND TO CALL WITH ANY CHANGES, ALSO TO REFRAIN FROM PICKING ANY OTHER SCABBED AREAS AS THIS COULD INCREASE POSSIBILITY OF INFECTION. SHE RELAYED MESSAGE AND PATIENT WILL CALL WITH ANY CONCERNS.

## 2025-06-05 NOTE — PROGRESS NOTES
Physical Therapy Daily Note  [address] [facility] [address] [place] [address]    VISIT#: 10    Subjective   Malik Abbasi reports 0/10. Pt reports there was a scab at the distal portion of his L knee incision that was coming off and he pulled off causing his incision to open slightly.       Objective     See Exercise, Manual, and Modality Logs for complete treatment.     Assessment/Plan    Pt does have slight opening at distal portion of incision with minimal clear serous drainage coming out of incision. Sent picture to ortho and called and talked to Stacie to make them aware. Discussed about potential concerns for infection due to opening. Discussed with patient to not pick at scabs due to potential risk for infection. Pt discussed he needs to work outside today, discussed with patient to cover incision while working outside to keep incision clean, but once inside to take cover off to allow proper healing. When patient was exercising today, the scab on proximal portion started to drain as well. Educated patient to again, not pick at scab. Continued to progress patient's exercises today. Avoided significant flexion exercises due to incision being slightly opened. Did focus on extension exercises and patient able to achieve 2 degrees of neutral active knee extension on L LE after manual therapy. Will continue to progress patient as able.    Progress per Plan of Care and Progress strengthening /stabilization /functional activity            Timed:                 Manual Therapy:    12     mins  65892;     Therapeutic Exercise:    15     mins  11374;     Neuromuscular Melanie:    0    mins  31703;    Therapeutic Activity:     0     mins  71789;     Gait Trainin     mins  29427;     Ultrasound:     0     mins  46832;    Ionto                               0    mins   69411  Self pay                         0     mins [email or web address]    Un-Timed:  Electrical Stimulation:    0     mins  70013 ( )  [facility] Repos    0      mins 34536  Dry Needling     0     mins self-pay  Traction     0     mins 85047    Timed Treatment:   27   mins   Total Treatment:     45   mins    Enio Mariscal PT  Physical Therapist    PT SIGNATURE: Electronically signed by Enio Mariscal PT  [place] LICENSE: [id number]

## 2025-06-09 ENCOUNTER — TREATMENT (OUTPATIENT)
Dept: PHYSICAL THERAPY | Facility: CLINIC | Age: 60
End: 2025-06-09
Payer: OTHER GOVERNMENT

## 2025-06-09 DIAGNOSIS — M25.662 DECREASED RANGE OF MOTION (ROM) OF LEFT KNEE: ICD-10-CM

## 2025-06-09 DIAGNOSIS — R29.898 WEAKNESS OF LEFT LOWER EXTREMITY: ICD-10-CM

## 2025-06-09 DIAGNOSIS — M25.562 ACUTE PAIN OF LEFT KNEE: ICD-10-CM

## 2025-06-09 DIAGNOSIS — R26.9 GAIT DISTURBANCE: ICD-10-CM

## 2025-06-09 DIAGNOSIS — Z96.652 STATUS POST TOTAL LEFT KNEE REPLACEMENT: Primary | ICD-10-CM

## 2025-06-09 PROCEDURE — 97110 THERAPEUTIC EXERCISES: CPT | Performed by: PHYSICAL THERAPIST

## 2025-06-09 PROCEDURE — 97164 PT RE-EVAL EST PLAN CARE: CPT | Performed by: PHYSICAL THERAPIST

## 2025-06-09 PROCEDURE — 97140 MANUAL THERAPY 1/> REGIONS: CPT | Performed by: PHYSICAL THERAPIST

## 2025-06-09 NOTE — PROGRESS NOTES
Progress Assessment  [address] [facility] [address] [place] [address]        Patient: Malik Abbasi   : 1965  Diagnosis/ICD-10 Code:  Status post total left knee replacement [Z96.652]  Referring practitioner: Fred Chang MD  Date of Initial Visit: Type: THERAPY  Noted: 2025  Today's Date: 2025  Patient seen for 11 sessions      Subjective:   Malik Abbasi reports: 0/10  Subjective Questionnaire: LEFS: 47/80  Clinical Progress: improved  Home Program Compliance: Yes  Treatment has included: therapeutic exercise, neuromuscular re-education, manual therapy, therapeutic activity, and gait training    Subjective     Pt reports he continues to be compliant going to the gym and performing his exercises. Pt reports he does still have pain in L hip. Discussed with patient about gait mechanics and patient reports when he walks with normal gait mechanics his L knee pops, therefore he doesn't like to walk with normal gait mechanics.     Objective       Active Range of Motion   Left Knee   Flexion: 104 degrees   Extension: Lacking 8 degrees from neutral prior to stretching and lacking 1 degree from neutral after stretching     Right Knee   Flexion: 110 degrees   Extension: 8 degrees      Passive Range of Motion   Left Knee   Flexion: 118 degrees   Extension: 0 degrees      Strength/Myotome Testing      Left Hip   Planes of Motion   Flexion: 4     Right Hip   Planes of Motion   Flexion: 4+     Left Knee   Flexion: 4  Extension: 4     Right Knee   Flexion: 4+  Extension: 4+     Ambulation      Observational Gait      Additional Observational Gait Details  Pt ambulates without AD. With cues to ambulate with proper mechanics, patient is able to ambulate without antalgic gait, but decrease in full knee extension on B knees. Without cues, patient has decrease in weightbearing on L LE and decrease in knee extension B.      Assessment/Plan    Pt has made excellent progress with improving L knee PROM/AROM and  strength since IE. Pt initially has greater decrease in L knee ROM, but after stretching, patient's L knee active ROM greatly improves. Pt's incision is closed today, but does still have scabbing present. Discussed with patient to not pick at scabs and allow L knee to heal. Also discussed with patient that he is not allowed in his hot tub at this time. Pt does require continued skilled PT in order to address deficits such as ROM, strength, and gait mechanics in order to return patient back to maximum function such as walking with normal gait mechanics. Will continue to progress patient as able.       Goals  Plan Goals:      LTG 1: 12 weeks: The patient will demonstrate 0 to 120 degrees of ROM for the left knee in order to allow patient to ascend/descend stairs and ambulate with normal gait mechanics.     STATUS: IN PROGRESS        STG 1a: 6 weeks: The patient will demonstrate 5 to 100 degrees of ROM for the left knee.     STATUS: MET        2. The patient has limited strength of the left knee.     LTG 2: 12 weeks: The patient will demonstrate 5/5 strength for left knee flexion and extension in order to allow patient improved joint stability.     STATUS: IN PROGRESS     STG 2a: 6 weeks: The patient will demonstrate 4/5 strength for left knee flexion and extension     STATUS: MET        3. The patient has gait dysfunction.     LTG 3: 12 weeks: The patient will ambulate without assistive device, independently, for community distances with minimal limp to the left lower extremity in order to improve mobility and allow patient to perform activities such as grocery shopping with greater ease.     STATUS: IN PROGRESS        LTG 4: 12 weeks: The patient will report a pain rating of 1/10 or better in order to improve tolerance to activities of daily living and improve sleep quality.     STATUS: MET     STG 4: 6 weeks: The patient will report a pain rating of 3/10 or better.     STATUS: MET     LTG 5: 12 weeks: The patient  will demonstrate 20-39% limitation by achieving a score of 60 on the Lower Extremity Functional Scale.     STATUS: IN PROGRESS        STG 5a: 6 weeks: The patient will demonstrate 40-59% limitation by achieving a score of 40 on the Lower Extremity Functional Scale.     STATUS: MET    Progress toward previous goals: Partially Met    See Exercise, Manual, and Modality Logs for complete treatment.         Recommendations: Continue as planned  Timeframe:2x a week for 1 month  Prognosis to achieve goals: good    PT SIGNATURE: Electronically signed by Enio Mariscal, PT  [place] LICENSE: [id number]    Based upon review of the patient's progress and continued therapy plan, it is my medical opinion that Malik Abbasi should continue physical therapy treatment at [facility]  [address] [place] [id number] [phone].      Timed:                 Manual Therapy:    12     mins  11461;     Therapeutic Exercise:    10     mins  46780;     Neuromuscular Melanie:    0    mins  94489;    Therapeutic Activity:     8     mins  82613;     Gait Trainin     mins  42624;     Ultrasound:     0     mins  86457;    Ionto                               0    mins   61553  Self pay                         0     mins [email or web address]    Un-Timed:  Electrical Stimulation:    0     mins  21034 ( )  [employer] Repos    0     mins 64327  Dry Needling     0     mins self-pay  Traction     0     mins 37440  Re-evaluation     8     mins 95266    Timed Treatment:   30   mins   Total Treatment:     38   mins      I certify that the therapy services are furnished while this patient is under my care.  The services outlined above are required by this patient, and will be reviewed every 90 days.

## 2025-06-12 ENCOUNTER — TREATMENT (OUTPATIENT)
Dept: PHYSICAL THERAPY | Facility: CLINIC | Age: 60
End: 2025-06-12
Payer: OTHER GOVERNMENT

## 2025-06-12 DIAGNOSIS — Z96.652 STATUS POST TOTAL LEFT KNEE REPLACEMENT: Primary | ICD-10-CM

## 2025-06-12 DIAGNOSIS — R26.9 GAIT DISTURBANCE: ICD-10-CM

## 2025-06-12 DIAGNOSIS — R29.898 WEAKNESS OF LEFT LOWER EXTREMITY: ICD-10-CM

## 2025-06-12 DIAGNOSIS — M25.662 DECREASED RANGE OF MOTION (ROM) OF LEFT KNEE: ICD-10-CM

## 2025-06-12 DIAGNOSIS — M25.562 ACUTE PAIN OF LEFT KNEE: ICD-10-CM

## 2025-06-12 NOTE — PROGRESS NOTES
"Physical Therapy Daily Treatment Note  [address] [facility], [address] [place] [address]        Patient: Malik Abbasi   : 1965  Diagnosis/ICD-10 Code:  Status post total left knee replacement [Z96.652]  Referring practitioner: Fred Chang MD  Date of Initial Visit: Type: THERAPY  Noted: 2025  Today's Date: 2025  Patient seen for 12 sessions             Subjective   Malik Abbasi reports having mild Left knee pain - reporting less than 1 upon arrival today.  He does report that he feels \"Popping\" in his left knee when walking.    Objective     Active Range of Motion   Left Knee   Flexion: 110 degrees   Extension: 8 degrees (Lacking full extension)        Passive Range of Motion (after stretching)  Left Knee   Flexion: 115-120 degrees   Extension: 0-5 degrees   (Lacking full extension)     + Tightness noted Left Hip / Knee musculature.  Left Lower extremity has tendency to externally rotate when ambulating or when at rest.         See Exercise and Manual Logs for complete treatment.       Assessment/Plan       Pt tolerated therapy session well today with progression of therapeutic exercises, CKC-Functional activities, and Manual therapy. He has improved, but continues to have deficits in His Left Hip/Knee ROM,  Strength, and Stability; limiting functional mobility and ability to perform ADLs without increased pain or difficulty at this time.      Pt now ambulating in/out of  clinic without assistive device.  Gait has improved as pt appears to be ambulating with more fluid less stiff gait pattern; however, continues to exhibit  General stiffness in bilateral lower extremities during ambulation.     Progress per Plan of Care- as tolerated               Timed:  Manual Therapy:    10     mins  94313;  Therapeutic Exercise:    20     mins  97148;     Neuromuscular Melanie:    0    mins  16223;    Therapeutic Activity:     15     mins  39786;     Gait Trainin     mins  51181;     Ultrasound:    "  0     mins  23416;    Electrical Stimulation:    0     mins  33442;  Iontophoresis     0     mins  19421    Untimed:  Electrical Stimulation:    0     mins  39709 ( );  Mechanical Traction:    0     mins  41224;   Fluidotherapy     0     mins  24014  Hot pack     00     mins  69440  Cold pack     0     mins  41575    Timed Treatment:   45   mins   Total Treatment:     45   mins        Bea Waddell PTA  Physical Therapist Assistant

## 2025-06-16 ENCOUNTER — OFFICE VISIT (OUTPATIENT)
Dept: ORTHOPEDIC SURGERY | Facility: CLINIC | Age: 60
End: 2025-06-16
Payer: OTHER GOVERNMENT

## 2025-06-16 VITALS
DIASTOLIC BLOOD PRESSURE: 91 MMHG | HEART RATE: 67 BPM | SYSTOLIC BLOOD PRESSURE: 179 MMHG | OXYGEN SATURATION: 95 % | WEIGHT: 315 LBS | BODY MASS INDEX: 44.1 KG/M2 | HEIGHT: 71 IN

## 2025-06-16 DIAGNOSIS — Z96.652 S/P TOTAL KNEE ARTHROPLASTY, LEFT: Primary | ICD-10-CM

## 2025-06-16 PROCEDURE — 99024 POSTOP FOLLOW-UP VISIT: CPT | Performed by: PHYSICIAN ASSISTANT

## 2025-06-16 NOTE — PROGRESS NOTES
Chief Complaint  Follow-up of the Left Knee    Subjective          History of Present Illness   History of Present Illness  The patient is a 60-year-old male who presents for a follow-up on his left knee. He is 6 weeks postoperative from a left total knee arthroplasty performed on 2025 with Dr. Chang.     He has been attending physical therapy at [facility], with his last visit being on 2025. He has expressed interest in resuming light treadmill exercises at [employer]. He reports that his recovery is progressing well, with minimal swelling and persistent numbness in the knee. He has experienced a popping sensation in the knee for some time, which he can feel but not hear. This sensation has been accompanied by mild discomfort over the past few days, although it is not painful. He describes the discomfort as a dull ache rather than sharp pain. Denies knew injury, actually reports decreased activity over the weekend when the pain begin so he wonders if it is from that. He continues to experience restlessness at night, often waking up slightly when turning in bed, but manages to get 6 to 7 hours of sleep.    SOCIAL HISTORY  Exercise: Light treadmill exercises at [employer], riding bike, and steps.      Patient denies redness, drainage, or complications from incision. Denies fever or chills.     Allergies   Allergen Reactions    Propafenone Shortness Of Breath    Tirzepatide Anaphylaxis     Hives    Codeine Other (See Comments) and Dizziness     Syncope as a teenager     Naltrexone-Bupropion Hcl Er Mental Status Change     Other reaction(s): Dream disorder        Social History     Socioeconomic History    Marital status:    Tobacco Use    Smoking status: Former     Current packs/day: 0.00     Types: Cigarettes     Quit date: 2006     Years since quittin.7    Smokeless tobacco: Never   Vaping Use    Vaping status: Never Used   Substance and Sexual Activity    Alcohol use:  "Yes     Comment: rarely    Drug use: No    Sexual activity: Defer        Tobacco Use: Medium Risk (6/16/2025)    Patient History     Smoking Tobacco Use: Former     Smokeless Tobacco Use: Never     Passive Exposure: Not on file     Patient reports they have a history of tobacco use; encouraged continued tobacco cessation for further health benefits.     I reviewed the patient's chief complaint, history of present illness, review of systems, past medical history, surgical history, family history, social history, medications, and allergy list.     REVIEW OF SYSTEMS    Constitutional: Denies fevers, chills, weight loss  Cardiovascular: Denies chest pain, shortness of breath  Skin: Denies rashes, acute skin changes  Neurologic: Denies headache, loss of consciousness  MSK: Left knee pain      Objective   Vital Signs:   /91   Pulse 67   Ht 180.3 cm (71\")   Wt (!) 148 kg (326 lb 4.5 oz)   SpO2 95%   BMI 45.51 kg/m²     Body mass index is 45.51 kg/m².    Physical Exam    Physical Exam  Musculoskeletal:  Left knee: Postoperative status post left total knee arthroplasty. Mild swelling noted. Range of motion: extension is full, flexion approximately 115 degrees independently, 118 degrees with assistance. No erythema or significant warmth. Mild discomfort with certain movements. No signs of infection.  Hip: Mild ache noted on the lateral aspect of the hip, suggestive of possible bursitis or tightness in the gluteal muscles.    General: Alert, no acute distress  Gait: Nontantalgic without use of AD  Left lower extremity: Surgical incision well healing, decent size scab (about 1.5-2cm diameter) at the most proximal aspect of incision that is well adhered with no active drainage.  No active drainage or redness noted.  No concerning signs for infection. Mild knee effusion. Knee extensor mechanism intact. Hip flexion intact. 115 degrees flexion. full extension. Knee stable to varus and valgus stress. Calf soft, nontender. " Demonstrates active ankle plantarflexion and dorsiflexion. Sensation intact over the dorsal and plantar foot. Palpable pedal pulses.        Assessment and Plan    Diagnoses and all orders for this visit:    1. S/P total knee arthroplasty, left (Primary)  -     Ambulatory Referral to Physical Therapy for Evaluation & Treatment      Patient is doing very well. Advised to continue PT to completion to progress strength and ROM.  PT reordered today. Continue home exercises.     Continue icing knee as needed up to 4 times daily for no longer than 15-20 mins at a time.     Wound care discussed. Okay to use thin layer Neosporin/bacitracin over scabbing to loosen.     Hot tub: preferably wait until scab is off and ensure incision well healed before submerging/soaking in hot tub.    Discussed importance of weaning off narcotics. He is off since POD4.     Follow-up in 6 weeks. We will obtain new x-rays of the left knee at next visit.    Call with questions/concerns.      Assessment & Plan          Call or return if symptoms worsen or patient has any concerns.       Follow Up   No follow-ups on file.  There are no Patient Instructions on file for this visit.  Patient was given instructions and counseling regarding his condition or for health maintenance advice. Please see specific information pulled into the AVS if appropriate.     Sarah Richardson PA-C  06/16/25  14:20 EDT    Patient or patient representative verbalized consent for the use of Ambient Listening during the visit with  Sarah Richardson PA-C for chart documentation. 6/16/2025  14:20 EDT

## 2025-06-17 ENCOUNTER — TREATMENT (OUTPATIENT)
Dept: PHYSICAL THERAPY | Facility: CLINIC | Age: 60
End: 2025-06-17
Payer: OTHER GOVERNMENT

## 2025-06-17 DIAGNOSIS — Z96.652 STATUS POST TOTAL LEFT KNEE REPLACEMENT: Primary | ICD-10-CM

## 2025-06-17 DIAGNOSIS — M25.562 ACUTE PAIN OF LEFT KNEE: ICD-10-CM

## 2025-06-17 DIAGNOSIS — R29.898 WEAKNESS OF LEFT LOWER EXTREMITY: ICD-10-CM

## 2025-06-17 DIAGNOSIS — M25.662 DECREASED RANGE OF MOTION (ROM) OF LEFT KNEE: ICD-10-CM

## 2025-06-17 DIAGNOSIS — R26.9 GAIT DISTURBANCE: ICD-10-CM

## 2025-06-17 PROCEDURE — 97110 THERAPEUTIC EXERCISES: CPT | Performed by: PHYSICAL THERAPIST

## 2025-06-17 PROCEDURE — 97140 MANUAL THERAPY 1/> REGIONS: CPT | Performed by: PHYSICAL THERAPIST

## 2025-06-17 NOTE — PROGRESS NOTES
"Physical Therapy Daily Treatment Note  [place] [facility] [address] [place] [address]    Patient: Malik Abbasi   : 1965  Referring practitioner: Fred Chang MD  Date of Initial Visit: Type: THERAPY  Noted: 2025  Today's Date: 2025  Patient seen for 13 sessions           Subjective  Malik Abbasi reports: \"fine, not really discomfort.\" \"I saw the PA yesterday and she sent a new order.\" \"It is just tight.\" Sd stated he goes to [facility].\"    Objective   See Exercise, Manual, and Modality Logs for complete treatment.     Assessment/Plan  Refer to updated PN from 25 for updated POC.    Visit Diagnoses:    ICD-10-CM ICD-9-CM   1. Status post total left knee replacement  Z96.652 V43.65   2. Gait disturbance  R26.9 781.2   3. Decreased range of motion (ROM) of left knee  M25.662 719.56   4. Weakness of left lower extremity  R29.898 729.89   5. Acute pain of left knee  M25.562 719.46     Patient tolerated today's treatment without complaints of pain.  Strength, ROM, and increased pain with activity deficits still limits patient's ability to perform ADL's. Further skilled care indicated at this time.           Timed:  Manual Therapy:    12     mins  73930;  Therapeutic Exercise:    20     mins  96764;     Neuromuscular Melanie:        mins  70932;    Therapeutic Activity:          mins  91542;     Gait Training:           mins  60889;     Ultrasound:          mins  27504;    Electrical Stimulation:         mins  76283 ( );  Aquatics  __   mins   86914    Untimed:  Electrical Stimulation:         mins  96503 ( );  Mechanical Traction:         mins  93919;     Timed Treatment:   32   mins   Total Treatment:     32   mins    Electronically Signed:  Moon Miles PTA  Physical Therapist Assistant    [place] PTA license [id number]            "

## 2025-06-19 ENCOUNTER — TREATMENT (OUTPATIENT)
Dept: PHYSICAL THERAPY | Facility: CLINIC | Age: 60
End: 2025-06-19
Payer: OTHER GOVERNMENT

## 2025-06-19 DIAGNOSIS — M25.562 ACUTE PAIN OF LEFT KNEE: ICD-10-CM

## 2025-06-19 DIAGNOSIS — Z96.652 STATUS POST TOTAL LEFT KNEE REPLACEMENT: Primary | ICD-10-CM

## 2025-06-19 DIAGNOSIS — R29.898 WEAKNESS OF LEFT LOWER EXTREMITY: ICD-10-CM

## 2025-06-19 DIAGNOSIS — R26.9 GAIT DISTURBANCE: ICD-10-CM

## 2025-06-19 DIAGNOSIS — M25.662 DECREASED RANGE OF MOTION (ROM) OF LEFT KNEE: ICD-10-CM

## 2025-06-19 NOTE — PROGRESS NOTES
Physical Therapy Daily Note  [address] [facility] [address] [place] [address]    VISIT#: 14    Subjective   Malik Abbasi reports 0/10 pain in L knee today.      Objective     See Exercise, Manual, and Modality Logs for complete treatment.     Assessment/Plan    Continued to progress patient's strengthening and ROM exercises and patient tolerated well with no reports of increase in pain. Pt does continue to lack full knee extension bilaterally. Pt able to achieve 111 degrees of L active knee flexion and 118 degrees of L passive knee flexion. Will continue to progress patient as able.     Progress per Plan of Care and Progress strengthening /stabilization /functional activity            Timed:                 Manual Therapy:    12     mins  68320;     Therapeutic Exercise:    14     mins  30552;     Neuromuscular Melanie:    0    mins  85031;    Therapeutic Activity:     12     mins  18040;     Gait Trainin     mins  39485;     Ultrasound:     0     mins  21224;    Ionto                               0    mins   26068  Self pay                         0     mins [email or web address]    Un-Timed:  Electrical Stimulation:    0     mins  64708 (MC )  Canalith Repos    0     mins 17221  Dry Needling     0     mins self-pay  Traction     0     mins 12399    Timed Treatment:   38   mins   Total Treatment:     38   mins    Enio Mariscal PT  Physical Therapist    PT SIGNATURE: Electronically signed by Enio Mariscal PT  [place] LICENSE: [id number]

## 2025-06-23 ENCOUNTER — TREATMENT (OUTPATIENT)
Dept: PHYSICAL THERAPY | Facility: CLINIC | Age: 60
End: 2025-06-23
Payer: OTHER GOVERNMENT

## 2025-06-23 DIAGNOSIS — R29.898 WEAKNESS OF LEFT LOWER EXTREMITY: ICD-10-CM

## 2025-06-23 DIAGNOSIS — Z96.652 STATUS POST TOTAL LEFT KNEE REPLACEMENT: Primary | ICD-10-CM

## 2025-06-23 DIAGNOSIS — M25.562 ACUTE PAIN OF LEFT KNEE: ICD-10-CM

## 2025-06-23 DIAGNOSIS — M25.662 DECREASED RANGE OF MOTION (ROM) OF LEFT KNEE: ICD-10-CM

## 2025-06-23 DIAGNOSIS — R26.9 GAIT DISTURBANCE: ICD-10-CM

## 2025-06-23 PROCEDURE — 97140 MANUAL THERAPY 1/> REGIONS: CPT | Performed by: PHYSICAL THERAPIST

## 2025-06-23 PROCEDURE — 97110 THERAPEUTIC EXERCISES: CPT | Performed by: PHYSICAL THERAPIST

## 2025-06-23 PROCEDURE — 97530 THERAPEUTIC ACTIVITIES: CPT | Performed by: PHYSICAL THERAPIST

## 2025-06-23 NOTE — PROGRESS NOTES
Physical Therapy Daily Note  [address] [facility] [address] [place] [address]    VISIT#: 15    Subjective   Malik Abbasi reports 0/10 pain in L knee.       Objective     See Exercise, Manual, and Modality Logs for complete treatment.     Assessment/Plan    Continued to progress patient's strengthening exercises and patient tolerated well with no reports of increase in L knee pain. Pt did have opening on proximal incision today due to hitting his knee on a desk yesterday which took the scab off of his incision. Pt continues to be lacking 8 degrees from full knee extension on L knee. Will continue to progress patient as able.     Progress per Plan of Care and Progress strengthening /stabilization /functional activity            Timed:                 Manual Therapy:    12     mins  34652;     Therapeutic Exercise:    14     mins  53363;     Neuromuscular Melanie:    0    mins  99198;    Therapeutic Activity:     12     mins  13996;     Gait Trainin     mins  00624;     Ultrasound:     0     mins  24157;    Ionto                               0    mins   37274  Self pay                         0     mins [email or web address]    Un-Timed:  Electrical Stimulation:    0     mins  83754 ( )  Canalith Repos    0     mins 59160  Dry Needling     0     mins self-pay  Traction     0     mins 00448    Timed Treatment:   38   mins   Total Treatment:     38   mins    Enio Mariscal PT  Physical Therapist    PT SIGNATURE: Electronically signed by Enio Mariscal PT  [place] LICENSE: [id number]

## 2025-07-01 ENCOUNTER — TREATMENT (OUTPATIENT)
Dept: PHYSICAL THERAPY | Facility: CLINIC | Age: 60
End: 2025-07-01
Payer: OTHER GOVERNMENT

## 2025-07-01 DIAGNOSIS — Z96.652 STATUS POST TOTAL LEFT KNEE REPLACEMENT: Primary | ICD-10-CM

## 2025-07-01 DIAGNOSIS — M25.562 ACUTE PAIN OF LEFT KNEE: ICD-10-CM

## 2025-07-01 DIAGNOSIS — R29.898 WEAKNESS OF LEFT LOWER EXTREMITY: ICD-10-CM

## 2025-07-01 DIAGNOSIS — R26.9 GAIT DISTURBANCE: ICD-10-CM

## 2025-07-01 DIAGNOSIS — M25.662 DECREASED RANGE OF MOTION (ROM) OF LEFT KNEE: ICD-10-CM

## 2025-07-01 NOTE — PROGRESS NOTES
Progress Assessment  [address] [facility] [address] [place] [address]        Patient: Malik Abbasi   : 1965  Diagnosis/ICD-10 Code:  Status post total left knee replacement [Z96.652]  Referring practitioner: Fred Chang MD  Date of Initial Visit: Type: THERAPY  Noted: 2025  Today's Date: 2025  Patient seen for 16 sessions      Subjective:   Malik Abbasi reports: 0.5/10  Subjective Questionnaire: LEFS: 60/80  Clinical Progress: improved  Home Program Compliance: Yes  Treatment has included: therapeutic exercise, neuromuscular re-education, manual therapy, therapeutic activity, and gait training    Subjective     Pt reports that he does still have stiffness in L knee. Pt reports otherwise he has returned back to all activities with no difficulty, just stiffness. Pt reports he has been compliant with going to the gym.       Objective     Active Range of Motion   Left Knee   Flexion: 110 degrees   Extension: Lacking 8 degrees from neutral      Right Knee   Flexion: 105 degrees   Extension: Lacking 10 degrees from neutral     Passive Range of Motion   Left Knee   Flexion: 118 degrees   Extension: Lacking 4 degrees from neutral     Strength/Myotome Testing      Left Hip   Planes of Motion   Flexion: 5     Right Hip   Planes of Motion   Flexion: 5     Left Knee   Flexion: 5  Extension: 5     Right Knee   Flexion: 5  Extension: 5       Assessment/Plan    Pt has made excellent progress with improving strength and ROM in L knee. Pt has met all goals at this time except long term ROM goal. Pt does still have deficits with both flexion and extension in L knee. Pt's ROM in L knee (surgical knee) is better than R knee (nonsurgical) at this time. Discussed with patient that it is important to continue stretches at home in order to continue to improve L knee ROM. Pt has been very compliant with going to the gym to improve strength, discussed to add back stretches and patient agreed. Due to this and patient's  great progress, will be discharging patient as this time. Discussed with patient to call if he has any questions or concerns.       Goals  Plan Goals:      LTG 1: 12 weeks: The patient will demonstrate 0 to 120 degrees of ROM for the left knee in order to allow patient to ascend/descend stairs and ambulate with normal gait mechanics.     STATUS: NOT MET        STG 1a: 6 weeks: The patient will demonstrate 5 to 100 degrees of ROM for the left knee.     STATUS: MET        2. The patient has limited strength of the left knee.     LTG 2: 12 weeks: The patient will demonstrate 5/5 strength for left knee flexion and extension in order to allow patient improved joint stability.     STATUS: MET     STG 2a: 6 weeks: The patient will demonstrate 4/5 strength for left knee flexion and extension     STATUS: MET        3. The patient has gait dysfunction.     LTG 3: 12 weeks: The patient will ambulate without assistive device, independently, for community distances with minimal limp to the left lower extremity in order to improve mobility and allow patient to perform activities such as grocery shopping with greater ease.     STATUS: MET        LTG 4: 12 weeks: The patient will report a pain rating of 1/10 or better in order to improve tolerance to activities of daily living and improve sleep quality.     STATUS: MET     STG 4: 6 weeks: The patient will report a pain rating of 3/10 or better.     STATUS: MET     LTG 5: 12 weeks: The patient will demonstrate 20-39% limitation by achieving a score of 60 on the Lower Extremity Functional Scale.     STATUS: MET        STG 5a: 6 weeks: The patient will demonstrate 40-59% limitation by achieving a score of 40 on the Lower Extremity Functional Scale.     STATUS: MET    Progress toward previous goals: All Met except long term ROM goal    See Exercise, Manual, and Modality Logs for complete treatment.         Recommendations: Discharge      PT SIGNATURE: Electronically signed by Enio  Davey, PT  [place] LICENSE: [id number]    Based upon review of the patient's progress and continued therapy plan, it is my medical opinion that Malik Abbasi should continue physical therapy treatment at [facility]  [address] [place] [id number] [phone].      Timed:                 Manual Therapy:    0     mins  26102;     Therapeutic Exercise:    15     mins  87314;     Neuromuscular Melanie:    0    mins  12437;    Therapeutic Activity:     10     mins  48539;     Gait Trainin     mins  42371;     Ultrasound:     0     mins  67261;    Ionto                               0    mins   85619  Self pay                         0     mins [email or web address]    Un-Timed:  Electrical Stimulation:    0     mins  72315 ( )  Canalith Repos    0     mins 87129  Dry Needling     0     mins self-pay  Traction     0     mins 50800  Re-evaluation     8     mins 90135    Timed Treatment:   25   mins   Total Treatment:     33   mins      I certify that the therapy services are furnished while this patient is under my care.  The services outlined above are required by this patient, and will be reviewed every 90 days.

## 2025-07-28 ENCOUNTER — OFFICE VISIT (OUTPATIENT)
Dept: ORTHOPEDIC SURGERY | Facility: CLINIC | Age: 60
End: 2025-07-28
Payer: OTHER GOVERNMENT

## 2025-07-28 VITALS
DIASTOLIC BLOOD PRESSURE: 78 MMHG | WEIGHT: 315 LBS | BODY MASS INDEX: 44.1 KG/M2 | HEART RATE: 46 BPM | SYSTOLIC BLOOD PRESSURE: 145 MMHG | OXYGEN SATURATION: 96 % | HEIGHT: 71 IN

## 2025-07-28 DIAGNOSIS — Z96.652 S/P TOTAL KNEE ARTHROPLASTY, LEFT: Primary | ICD-10-CM

## 2025-07-28 PROCEDURE — 99024 POSTOP FOLLOW-UP VISIT: CPT | Performed by: PHYSICIAN ASSISTANT

## 2025-07-28 RX ORDER — METOPROLOL SUCCINATE 50 MG/1
TABLET, EXTENDED RELEASE ORAL
COMMUNITY
Start: 2025-07-07

## 2025-07-28 RX ORDER — AMOXICILLIN 500 MG/1
500 CAPSULE ORAL ONCE
Qty: 4 CAPSULE | Refills: 0 | Status: SHIPPED | OUTPATIENT
Start: 2025-07-28 | End: 2025-07-28

## 2025-07-28 NOTE — PROGRESS NOTES
Chief Complaint  Follow-up of the Left Knee    Subjective          History of Present Illness         History of Present Illness  The patient is a 60-year-old male here for a routine 3-month follow-up on his left knee. He is 12 weeks postoperative from a left total knee arthroplasty performed by Dr. Chang on 2025.    He has been attending physical therapy at [facility], operated by [facility] in [place] and also has [employer] membership. He reports that his leg feels slightly numb and experiences discomfort when descending stairs. He continues to attend physical therapy and has resumed his regular gym routine, focusing more on the exercises he was doing in therapy for his knee. He has been using the pool and Jacuzzi for the past few weeks and notes that his knee still feels weak. He is not currently taking any pain medication.    SOCIAL HISTORY  Exercise: Regular gym workouts at [employer]      Patient denies redness, drainage, or complications from incision. Denies fever or chills.     Allergies   Allergen Reactions    Propafenone Shortness Of Breath    Tirzepatide Anaphylaxis     Hives    Codeine Other (See Comments) and Dizziness     Syncope as a teenager     Naltrexone-Bupropion Hcl Er Mental Status Change     Other reaction(s): Dream disorder        Social History     Socioeconomic History    Marital status:    Tobacco Use    Smoking status: Former     Current packs/day: 0.00     Types: Cigarettes     Quit date: 2006     Years since quittin.8    Smokeless tobacco: Never   Vaping Use    Vaping status: Never Used   Substance and Sexual Activity    Alcohol use: Yes     Comment: rarely    Drug use: No    Sexual activity: Defer        Tobacco Use: Medium Risk (2025)    Patient History     Smoking Tobacco Use: Former     Smokeless Tobacco Use: Never     Passive Exposure: Not on file     Patient reports they have a history of tobacco use; encouraged continued tobacco cessation for further health benefits.  "    I reviewed the patient's chief complaint, history of present illness, review of systems, past medical history, surgical history, family history, social history, medications, and allergy list.     REVIEW OF SYSTEMS    Constitutional: Denies fevers, chills, weight loss  Cardiovascular: Denies chest pain, shortness of breath  Skin: Denies rashes, acute skin changes  Neurologic: Denies headache, loss of consciousness  MSK: Left knee pain      Objective   Vital Signs:   /78   Pulse (!) 46   Ht 180.3 cm (71\")   Wt (!) 148 kg (326 lb 4.5 oz)   SpO2 96%   BMI 45.51 kg/m²     Body mass index is 45.51 kg/m².    Physical Exam    Physical Exam      General: Alert, no acute distress  Gait: Antalgic without use of AD  Left lower extremity: Surgical scar. No concerning signs for infection.  Minimal knee effusion. Knee extensor mechanism intact. Hip flexion intact.  120 degrees flexion.  0 degrees extension. Knee stable to varus and valgus stress. Calf soft, nontender. Demonstrates active ankle plantarflexion and dorsiflexion. Sensation intact over the dorsal and plantar foot. Palpable pedal pulses.     Imaging Results (Most Recent)       Procedure Component Value Units Date/Time    XR Knee 3 View Left [[id number]] Resulted: 07/28/25 1009     Updated: 07/28/25 1009    Narrative:      X-Ray Report:  Study: X-rays ordered, taken in the office, and reviewed today.   Site: Left knee Xray  Indication: Left total knee arthroplasty follow up.   View: AP/Lateral, Standing, and Maria Antonia view(s)  Findings: Intact left total knee arthroplasty. No evidence of hardware   malfunction, complication or loosening. No periprosthetic fractures   visualized. Patella is midline tracking on sunrise view.   Prior studies available for comparison: yes                      Assessment and Plan    Diagnoses and all orders for this visit:    1. S/P total knee arthroplasty, left (Primary)  -     XR Knee 3 View Left    Other orders  -     " amoxicillin (AMOXIL) 500 MG capsule; Take 1 capsule by mouth 1 time for 1 dose. Take 4 capsules by mouth 1 hour prior to teeth cleaning or dental procedure.  Recommend taking with food.  Dispense: 4 capsule; Refill: 0        X-rays were reviewed with the patient today. Hardware is stable and intact. Patient is doing well.     Incision well healed. May apply Vitamin E, cocoa butter, etc. over incision to aid in scar appearance. Educated that numbness over the incision can still remain at this point, and should continue to improve for up to 1 year postop. However, at the year ann if still experiencing numbness it may not return.    Encouraged to continue home exercises for ROM and strengthening.   May continue icing as needed for no more than 20 minutes at a time for comfort and inflammation.   Discussed avoiding kneeling directly on to the prosthetic and avoid deep squatting.     Discussed the need for antibiotic prophylaxis with any dental procedures following total joint replacement for life. Patient instructed to contact office if dental office is reluctant to prescribe antibiotics prior to procedure and we will prescribe them.  Prescription sent through to the pharmacy today for amoxicillin.    Patient will follow-up in 9 months. We will obtain new x-rays of the left knee at next visit.    Call or return if symptoms worsen or patient has any concerns.     Assessment & Plan      Follow Up   No follow-ups on file.  There are no Patient Instructions on file for this visit.  Patient was given instructions and counseling regarding his condition or for health maintenance advice. Please see specific information pulled into the AVS if appropriate.     Sarah Richardson PA-C  07/30/25  14:30 EDT    Patient or patient representative verbalized consent for the use of Ambient Listening during the visit with  Sarah Richardson PA-C for chart documentation. 7/30/2025  14:30 EDT

## (undated) DEVICE — DRP ROBOTIC ROSA BX/20

## (undated) DEVICE — SYR LUERLOK 30CC

## (undated) DEVICE — HANDPIECE SET WITH HIGH FLOW TIP AND SUCTION TUBE: Brand: INTERPULSE

## (undated) DEVICE — DRESSING,GAUZE,XEROFORM,CURAD,1"X8",ST: Brand: CURAD

## (undated) DEVICE — Device: Brand: DEFENDO AIR/WATER/SUCTION AND BIOPSY VALVE

## (undated) DEVICE — SLV SCD KN/LEN ADJ EXPRSS BLENDED MD 1P/U

## (undated) DEVICE — LINER SURG CANSTR SXN S/RIGD 1500CC

## (undated) DEVICE — MAT FLR ABS W/BLU/LINER 56X72IN WHT

## (undated) DEVICE — INTENDED FOR TISSUE SEPARATION, AND OTHER PROCEDURES THAT REQUIRE A SHARP SURGICAL BLADE TO PUNCTURE OR CUT.: Brand: BARD-PARKER ® CARBON RIB-BACK BLADES

## (undated) DEVICE — NEEDLE,18GX1.5",REG,BEVEL: Brand: MEDLINE

## (undated) DEVICE — DISPOSABLE TOURNIQUET CUFF 30"X4", 1-LINE, WHITE, STERILE, 1EA/PK, 10PK/CS: Brand: ASP MEDICAL

## (undated) DEVICE — PENCL SMOKE/EVAC MEGADYNE TELESCP 10FT

## (undated) DEVICE — DRSNG PAD ABD 8X10IN STRL

## (undated) DEVICE — DRSNG SURESITE WNDW 4X4.5

## (undated) DEVICE — BLCK/BITE BLOX WO/DENTL/RIM W/STRAP 54F

## (undated) DEVICE — CONN JET HYDRA H20 AUXILIARY DISP

## (undated) DEVICE — SOL IRR NACL 0.9PCT 3000ML

## (undated) DEVICE — SOLIDIFIER LIQLOC PLS 1500CC BT

## (undated) DEVICE — UNDYED BRAIDED (POLYGLACTIN 910), SYNTHETIC ABSORBABLE SUTURE: Brand: COATED VICRYL

## (undated) DEVICE — STERILE POLYISOPRENE POWDER-FREE SURGICAL GLOVES WITH EMOLLIENT COATING: Brand: PROTEXIS

## (undated) DEVICE — STRYKER PERFORMANCE SERIES SAGITTAL BLADE: Brand: STRYKER PERFORMANCE SERIES

## (undated) DEVICE — SINGLE-USE BIOPSY FORCEPS: Brand: RADIAL JAW 4

## (undated) DEVICE — SCRW HEX PERSONA FML 2.5X25MM PK/2
Type: IMPLANTABLE DEVICE | Site: KNEE | Status: NON-FUNCTIONAL
Removed: 2025-05-06

## (undated) DEVICE — DRP SURG U/DRP INVISISHIELD PA 48X52IN

## (undated) DEVICE — ELECTRD BLD MEGADYNE EZCLEAN STD 2.75IN XLNG

## (undated) DEVICE — ENCORE® LATEX ORTHO SIZE 8, STERILE LATEX POWDER-FREE SURGICAL GLOVE: Brand: ENCORE

## (undated) DEVICE — PEEL-AWAY TOGA, 2X LARGE: Brand: FLYTE

## (undated) DEVICE — SUT VIC 0 CT1 36IN J946H

## (undated) DEVICE — PROXIMATE RH ROTATING HEAD SKIN STAPLERS (35 WIDE) CONTAINS 35 STAINLESS STEEL STAPLES: Brand: PROXIMATE

## (undated) DEVICE — GAUZE,SPONGE,4"X4",16PLY,STRL,LF,10/TRAY: Brand: MEDLINE

## (undated) DEVICE — TOTAL KNEE-LF: Brand: MEDLINE INDUSTRIES, INC.

## (undated) DEVICE — GLOVE,SURG,SENSICARE SLT,LF,PF,7: Brand: MEDLINE

## (undated) DEVICE — TOWEL,OR,DSP,ST,BLUE,STD,4/PK,20PK/CS: Brand: MEDLINE

## (undated) DEVICE — SOL IRRG H2O PL/BG 1000ML STRL

## (undated) DEVICE — STERILE POLYISOPRENE POWDER-FREE SURGICAL GLOVES: Brand: PROTEXIS

## (undated) DEVICE — APPL CHLORAPREP HI/LITE 26ML ORNG

## (undated) DEVICE — THE STERILE LIGHT HANDLE COVER IS USED WITH STERIS SURGICAL LIGHTING AND VISUALIZATION SYSTEMS.

## (undated) DEVICE — 3 BONE CEMENT MIXER: Brand: MIXEVAC

## (undated) DEVICE — PULLOVER TOGA, 2X LARGE: Brand: FLYTE, SURGICOOL

## (undated) DEVICE — CVR LEG BOOTLEG F/R NOSKID 33IN

## (undated) DEVICE — SUT ETHIB 1 X538H ETX538H

## (undated) DEVICE — BASIC SINGLE BASIN-LF: Brand: MEDLINE INDUSTRIES, INC.

## (undated) DEVICE — Device

## (undated) DEVICE — SHEET,DRAPE,70X85,STERILE: Brand: MEDLINE